# Patient Record
Sex: MALE | Race: WHITE | Employment: OTHER | URBAN - NONMETROPOLITAN AREA
[De-identification: names, ages, dates, MRNs, and addresses within clinical notes are randomized per-mention and may not be internally consistent; named-entity substitution may affect disease eponyms.]

---

## 2017-01-18 RX ORDER — HEPARIN SODIUM (PORCINE) LOCK FLUSH IV SOLN 100 UNIT/ML 100 UNIT/ML
500 SOLUTION INTRAVENOUS PRN
Status: CANCELLED | OUTPATIENT
Start: 2017-02-01

## 2017-01-18 RX ORDER — DIPHENHYDRAMINE HYDROCHLORIDE 50 MG/ML
50 INJECTION INTRAMUSCULAR; INTRAVENOUS ONCE
Status: CANCELLED | OUTPATIENT
Start: 2017-02-01 | End: 2017-02-01

## 2017-01-18 RX ORDER — 0.9 % SODIUM CHLORIDE 0.9 %
10 VIAL (ML) INJECTION ONCE
Status: CANCELLED | OUTPATIENT
Start: 2017-02-01 | End: 2017-02-01

## 2017-01-18 RX ORDER — SODIUM CHLORIDE 0.9 % (FLUSH) 0.9 %
10 SYRINGE (ML) INJECTION PRN
Status: CANCELLED | OUTPATIENT
Start: 2017-02-01

## 2017-01-18 RX ORDER — SODIUM CHLORIDE 9 MG/ML
INJECTION, SOLUTION INTRAVENOUS CONTINUOUS
Status: CANCELLED | OUTPATIENT
Start: 2017-02-01

## 2017-01-18 RX ORDER — METHYLPREDNISOLONE SODIUM SUCCINATE 125 MG/2ML
125 INJECTION, POWDER, LYOPHILIZED, FOR SOLUTION INTRAMUSCULAR; INTRAVENOUS ONCE
Status: CANCELLED | OUTPATIENT
Start: 2017-02-01 | End: 2017-02-01

## 2017-02-01 ENCOUNTER — OFFICE VISIT (OUTPATIENT)
Dept: ONCOLOGY | Age: 82
End: 2017-02-01

## 2017-02-01 VITALS
HEART RATE: 79 BPM | TEMPERATURE: 98.3 F | HEIGHT: 69 IN | DIASTOLIC BLOOD PRESSURE: 62 MMHG | WEIGHT: 196.4 LBS | SYSTOLIC BLOOD PRESSURE: 102 MMHG | RESPIRATION RATE: 18 BRPM | OXYGEN SATURATION: 97 % | BODY MASS INDEX: 29.09 KG/M2

## 2017-02-01 DIAGNOSIS — C64.9 RENAL CANCER, UNSPECIFIED LATERALITY (HCC): Primary | ICD-10-CM

## 2017-02-01 PROCEDURE — 99214 OFFICE O/P EST MOD 30 MIN: CPT | Performed by: INTERNAL MEDICINE

## 2017-02-01 RX ORDER — ONDANSETRON 4 MG/1
4 TABLET, FILM COATED ORAL EVERY 8 HOURS PRN
Qty: 60 TABLET | Refills: 5 | Status: SHIPPED | OUTPATIENT
Start: 2017-02-01 | End: 2019-01-28

## 2017-02-15 RX ORDER — 0.9 % SODIUM CHLORIDE 0.9 %
10 VIAL (ML) INJECTION ONCE
Status: CANCELLED | OUTPATIENT
Start: 2017-03-01 | End: 2017-03-01

## 2017-02-15 RX ORDER — DIPHENHYDRAMINE HYDROCHLORIDE 50 MG/ML
50 INJECTION INTRAMUSCULAR; INTRAVENOUS ONCE
Status: CANCELLED | OUTPATIENT
Start: 2017-03-01 | End: 2017-03-01

## 2017-02-15 RX ORDER — SODIUM CHLORIDE 9 MG/ML
INJECTION, SOLUTION INTRAVENOUS CONTINUOUS
Status: CANCELLED | OUTPATIENT
Start: 2017-03-01

## 2017-02-15 RX ORDER — HEPARIN SODIUM (PORCINE) LOCK FLUSH IV SOLN 100 UNIT/ML 100 UNIT/ML
500 SOLUTION INTRAVENOUS PRN
Status: CANCELLED | OUTPATIENT
Start: 2017-03-01

## 2017-02-15 RX ORDER — METHYLPREDNISOLONE SODIUM SUCCINATE 125 MG/2ML
125 INJECTION, POWDER, LYOPHILIZED, FOR SOLUTION INTRAMUSCULAR; INTRAVENOUS ONCE
Status: CANCELLED | OUTPATIENT
Start: 2017-03-01 | End: 2017-03-01

## 2017-02-15 RX ORDER — SODIUM CHLORIDE 0.9 % (FLUSH) 0.9 %
10 SYRINGE (ML) INJECTION PRN
Status: CANCELLED | OUTPATIENT
Start: 2017-03-01

## 2017-03-08 RX ORDER — SODIUM CHLORIDE 9 MG/ML
INJECTION, SOLUTION INTRAVENOUS CONTINUOUS
Status: CANCELLED | OUTPATIENT
Start: 2017-03-29

## 2017-03-08 RX ORDER — HEPARIN SODIUM (PORCINE) LOCK FLUSH IV SOLN 100 UNIT/ML 100 UNIT/ML
500 SOLUTION INTRAVENOUS PRN
Status: CANCELLED | OUTPATIENT
Start: 2017-03-29

## 2017-03-08 RX ORDER — DIPHENHYDRAMINE HYDROCHLORIDE 50 MG/ML
50 INJECTION INTRAMUSCULAR; INTRAVENOUS ONCE
Status: CANCELLED | OUTPATIENT
Start: 2017-03-29 | End: 2017-03-29

## 2017-03-08 RX ORDER — SODIUM CHLORIDE 0.9 % (FLUSH) 0.9 %
10 SYRINGE (ML) INJECTION PRN
Status: CANCELLED | OUTPATIENT
Start: 2017-03-29

## 2017-03-08 RX ORDER — 0.9 % SODIUM CHLORIDE 0.9 %
10 VIAL (ML) INJECTION ONCE
Status: CANCELLED | OUTPATIENT
Start: 2017-03-29 | End: 2017-03-29

## 2017-03-08 RX ORDER — METHYLPREDNISOLONE SODIUM SUCCINATE 125 MG/2ML
125 INJECTION, POWDER, LYOPHILIZED, FOR SOLUTION INTRAMUSCULAR; INTRAVENOUS ONCE
Status: CANCELLED | OUTPATIENT
Start: 2017-03-29 | End: 2017-03-29

## 2017-03-29 ENCOUNTER — OFFICE VISIT (OUTPATIENT)
Dept: ONCOLOGY | Age: 82
End: 2017-03-29

## 2017-03-29 VITALS
DIASTOLIC BLOOD PRESSURE: 64 MMHG | HEIGHT: 69 IN | RESPIRATION RATE: 20 BRPM | WEIGHT: 196.6 LBS | TEMPERATURE: 97 F | BODY MASS INDEX: 29.12 KG/M2 | HEART RATE: 76 BPM | OXYGEN SATURATION: 97 % | SYSTOLIC BLOOD PRESSURE: 110 MMHG

## 2017-03-29 DIAGNOSIS — D3A.00 CARCINOID TUMOR: Primary | ICD-10-CM

## 2017-03-29 PROCEDURE — 99215 OFFICE O/P EST HI 40 MIN: CPT | Performed by: INTERNAL MEDICINE

## 2017-03-29 RX ORDER — SODIUM CHLORIDE 0.9 % (FLUSH) 0.9 %
10 SYRINGE (ML) INJECTION PRN
Status: CANCELLED | OUTPATIENT
Start: 2017-05-24

## 2017-03-29 RX ORDER — SODIUM CHLORIDE 9 MG/ML
INJECTION, SOLUTION INTRAVENOUS CONTINUOUS
Status: CANCELLED | OUTPATIENT
Start: 2017-05-24

## 2017-03-29 RX ORDER — METHYLPREDNISOLONE SODIUM SUCCINATE 125 MG/2ML
125 INJECTION, POWDER, LYOPHILIZED, FOR SOLUTION INTRAMUSCULAR; INTRAVENOUS ONCE
Status: CANCELLED | OUTPATIENT
Start: 2017-05-24 | End: 2017-05-24

## 2017-03-29 RX ORDER — DIPHENHYDRAMINE HYDROCHLORIDE 50 MG/ML
50 INJECTION INTRAMUSCULAR; INTRAVENOUS ONCE
Status: CANCELLED | OUTPATIENT
Start: 2017-05-24 | End: 2017-05-24

## 2017-03-29 RX ORDER — METHYLPREDNISOLONE SODIUM SUCCINATE 125 MG/2ML
125 INJECTION, POWDER, LYOPHILIZED, FOR SOLUTION INTRAMUSCULAR; INTRAVENOUS ONCE
Status: CANCELLED | OUTPATIENT
Start: 2017-04-26 | End: 2017-04-26

## 2017-03-29 RX ORDER — HEPARIN SODIUM (PORCINE) LOCK FLUSH IV SOLN 100 UNIT/ML 100 UNIT/ML
500 SOLUTION INTRAVENOUS PRN
Status: CANCELLED | OUTPATIENT
Start: 2017-05-24

## 2017-03-29 RX ORDER — SODIUM CHLORIDE 9 MG/ML
INJECTION, SOLUTION INTRAVENOUS CONTINUOUS
Status: CANCELLED | OUTPATIENT
Start: 2017-04-26

## 2017-03-29 RX ORDER — DIPHENHYDRAMINE HYDROCHLORIDE 50 MG/ML
50 INJECTION INTRAMUSCULAR; INTRAVENOUS ONCE
Status: CANCELLED | OUTPATIENT
Start: 2017-04-26 | End: 2017-04-26

## 2017-03-29 RX ORDER — 0.9 % SODIUM CHLORIDE 0.9 %
10 VIAL (ML) INJECTION ONCE
Status: CANCELLED | OUTPATIENT
Start: 2017-04-26 | End: 2017-04-26

## 2017-03-29 RX ORDER — 0.9 % SODIUM CHLORIDE 0.9 %
10 VIAL (ML) INJECTION ONCE
Status: CANCELLED | OUTPATIENT
Start: 2017-05-24 | End: 2017-05-24

## 2017-03-29 RX ORDER — SODIUM CHLORIDE 0.9 % (FLUSH) 0.9 %
10 SYRINGE (ML) INJECTION PRN
Status: CANCELLED | OUTPATIENT
Start: 2017-04-26

## 2017-03-29 RX ORDER — HEPARIN SODIUM (PORCINE) LOCK FLUSH IV SOLN 100 UNIT/ML 100 UNIT/ML
500 SOLUTION INTRAVENOUS PRN
Status: CANCELLED | OUTPATIENT
Start: 2017-04-26

## 2017-06-21 ENCOUNTER — OFFICE VISIT (OUTPATIENT)
Dept: ONCOLOGY | Age: 82
End: 2017-06-21

## 2017-06-21 VITALS
OXYGEN SATURATION: 98 % | RESPIRATION RATE: 18 BRPM | DIASTOLIC BLOOD PRESSURE: 54 MMHG | TEMPERATURE: 97.2 F | BODY MASS INDEX: 28.44 KG/M2 | HEART RATE: 82 BPM | WEIGHT: 192 LBS | HEIGHT: 69 IN | SYSTOLIC BLOOD PRESSURE: 95 MMHG

## 2017-06-21 DIAGNOSIS — C64.9 RENAL CANCER, UNSPECIFIED LATERALITY (HCC): Primary | ICD-10-CM

## 2017-06-21 PROCEDURE — 99215 OFFICE O/P EST HI 40 MIN: CPT | Performed by: INTERNAL MEDICINE

## 2017-06-21 RX ORDER — 0.9 % SODIUM CHLORIDE 0.9 %
10 VIAL (ML) INJECTION ONCE
Status: CANCELLED | OUTPATIENT
Start: 2017-07-19 | End: 2017-07-19

## 2017-06-21 RX ORDER — SODIUM CHLORIDE 9 MG/ML
INJECTION, SOLUTION INTRAVENOUS CONTINUOUS
Status: CANCELLED | OUTPATIENT
Start: 2017-07-19

## 2017-06-21 RX ORDER — SODIUM CHLORIDE 0.9 % (FLUSH) 0.9 %
10 SYRINGE (ML) INJECTION PRN
Status: CANCELLED | OUTPATIENT
Start: 2017-07-19

## 2017-06-21 RX ORDER — DIPHENHYDRAMINE HYDROCHLORIDE 50 MG/ML
50 INJECTION INTRAMUSCULAR; INTRAVENOUS ONCE
Status: CANCELLED | OUTPATIENT
Start: 2017-07-19 | End: 2017-07-19

## 2017-06-21 RX ORDER — HEPARIN SODIUM (PORCINE) LOCK FLUSH IV SOLN 100 UNIT/ML 100 UNIT/ML
500 SOLUTION INTRAVENOUS PRN
Status: CANCELLED | OUTPATIENT
Start: 2017-07-19

## 2017-06-21 RX ORDER — METHYLPREDNISOLONE SODIUM SUCCINATE 125 MG/2ML
125 INJECTION, POWDER, LYOPHILIZED, FOR SOLUTION INTRAMUSCULAR; INTRAVENOUS ONCE
Status: CANCELLED | OUTPATIENT
Start: 2017-07-19 | End: 2017-07-19

## 2017-07-19 ENCOUNTER — HOSPITAL ENCOUNTER (OUTPATIENT)
Dept: INFUSION THERAPY | Age: 82
Discharge: HOME | End: 2017-07-19
Payer: MEDICARE

## 2017-07-19 VITALS
TEMPERATURE: 98 F | WEIGHT: 191 LBS | OXYGEN SATURATION: 96 % | SYSTOLIC BLOOD PRESSURE: 97 MMHG | RESPIRATION RATE: 18 BRPM | HEART RATE: 77 BPM | BODY MASS INDEX: 28.29 KG/M2 | DIASTOLIC BLOOD PRESSURE: 56 MMHG | HEIGHT: 69 IN

## 2017-07-19 DIAGNOSIS — D3A.00 CARCINOID TUMOR: ICD-10-CM

## 2017-07-19 PROCEDURE — 6360000002 HC RX W HCPCS: Performed by: INTERNAL MEDICINE

## 2017-07-19 PROCEDURE — 96372 THER/PROPH/DIAG INJ SC/IM: CPT

## 2017-07-19 RX ADMIN — OCTREOTIDE ACETATE 30 MG: KIT at 10:37

## 2017-07-19 NOTE — IP AVS SNAPSHOT
After Visit Summary  (Discharge Instructions)    Medication List for Home    Based on the information you provided to us as well as any changes during this visit, the following is your updated medication list.  Compare this with your prescription bottles at home. If you have any questions or concerns, contact your primary care physician's office. Daily Medication List (This medication list can be shared with any healthcare provider who is helping you manage your medications)      ASK your doctor about these medications if you have questions        Last Dose    Next Dose Due AM NOON PM NIGHT    aspirin 81 MG tablet   Take 81 mg by mouth daily. atorvastatin 40 MG tablet   Commonly known as:  LIPITOR   Take 40 mg by mouth daily. clopidogrel 75 MG tablet   Commonly known as:  PLAVIX   Take 75 mg by mouth daily. Co Q-10 100 MG Caps   Take  by mouth.                                         isosorbide mononitrate 30 MG extended release tablet   Commonly known as:  IMDUR   Take 30 mg by mouth daily. losartan 50 MG tablet   Commonly known as:  COZAAR   Take 25 mg by mouth 2 times daily                                         ondansetron 4 MG tablet   Commonly known as:  ZOFRAN   Take 1 tablet by mouth every 8 hours as needed for Nausea or Vomiting                                         SANDOSTATIN LAR DEPOT IM   Inject  into the muscle every 28 days. Saw Palmetto 450 MG Caps   Take 450 mg by mouth 2 times daily. ULTRA FRANCESCO-TIME PO   Take 1 tablet by mouth daily.                                                  Allergies as of 7/19/2017        Reactions    Altace [Ramipril]     cough    Avodart [Dutasteride]     Cardura [Doxazosin] Cotrim [Sulfamethoxazole-trimethoprim]     Fenofibrate     Bleeding stomach    Finasteride Other (See Comments)    Hytrin [Terazosin]     Iron     Blurred vison    Naproxen     Pravachol [Pravastatin]     Procalamine     Yohimbine       Immunizations as of 2017     No immunizations on file. Last Vitals          Most Recent Value    Temp  98 °F (36.7 °C)    Pulse  77    Resp  18    BP  (!)  97/56         After Visit Summary    This summary was created for you. Thank you for entrusting your care to us. The following information includes details about your hospital/visit stay along with steps you should take to help with your recovery once you leave the hospital.  In this packet, you will find information about the topics listed below:    · Instructions about your medications including a list of your home medications  · A summary of your hospital visit  · Follow-up appointments once you have left the hospital  · Your care plan at home      You may receive a survey regarding the care you received during your stay. Your input is valuable to us. We encourage you to complete and return your survey in the envelope provided. We hope you will choose us in the future for your healthcare needs. Patient Information     Patient Name ROLANDA Jeong 1932      Care Provided at:     Name Address Phone       [address] [phone]            Your Visit    Here you will find information about your visit, including the reason for your visit. Please take this sheet with you when you visit your doctor or other health care provider in the future. It will help determine the best possible medical care for you at that time. If you have any questions once you leave the hospital, please call the department phone number listed below.         Why you were here     Your primary diagnosis was:  Not on File Visit Information     Date & Time Department Dept. Phone    7/19/2017 [facility] [phone]       Follow-up Appointments    Below is a list of your follow-up and future appointments. This may not be a complete list as you may have made appointments directly with providers that we are not aware of or your providers may have made some for you. Please call your providers to confirm appointments. It is important to keep your appointments. Please bring your current insurance card, photo ID, co-pay, and all medication bottles to your appointment. If self-pay, payment is expected at the time of service. Future Appointments     8/16/2017 10:00 AM     Appointment with [facility] at [facility] ([phone])   [address] [place] [address]       9/13/2017 9:45 AM     Appointment with Rachelle Che MD at [id number] of [facility] ([phone])   Please arrive 15 minutes prior to appointment, bring photo ID and insurance card. Please arrive 15 minutes prior to appointment, bring photo ID and insurance card. [address] [place] [address]       9/13/2017 10:45 AM     Appointment with [employer] [facility] at [facility] ([phone])   [address] [place] [address]       12/21/2017 11:30 AM     Appointment with Blu Oneill MD at [facility] ([phone])   Please arrive 15 minutes prior to appointment time, bring insurance card and photo ID. Please arrive 15 minutes prior to appointment time, bring insurance card and photo ID.   [address]  [facility]  [address] [place] [address]         Preventive Care        Date Due    Tetanus Combination Vaccine (1 - Tdap) 9/8/1951    Zoster Vaccine 9/8/1992    Pneumococcal Vaccines (two) for all adults aged 72 and over (1 of 2 - PCV13) 9/8/1997    Yearly Flu Vaccine (1) 8/1/2017                 Care Plan Once You Return Home This section includes instructions you will need to follow once you leave the hospital.  Your care team will discuss these with you, so you and those caring for you know how to best care for your health needs at home. This section may also include educational information about certain health topics that may be of help to you. Discharge Instructions       Please contact your Oncologist if you have any questions regarding the sandostatin that you received today. Patient instructed if experience any of the symptoms following today's sandostatin / to notify MD immediately or go to emergency department. * dizziness/lightheadedness  *acute nausea/vomiting - not relieved with medication  *headache - not relieved from Tylenol/pain medication  *chest pain/pressure  *rash/itching  *shortness of breath        Drink fluids - 48oz fluids daily  Call if develop fever/ chills/ signs or symptoms of infection      MyChart Signup     [employer] allows you to send messages to your doctor, view your test results, renew your prescriptions, schedule appointments, view visit notes, and more. How Do I Sign Up? 1. In your Internet browser, go to [email or web address]  2. Click on the Sign Up Now link in the Sign In box. You will see the New Member Sign Up page. 3. Enter your "[employer] Access Code exactly as it appears below. You will not need to use this code after youve completed the sign-up process. If you do not sign up before the expiration date, you must request a new code. "[employer] Access Code: [email or web address]  Expires: 8/20/2017 11:08 AM    4. Enter your Social Security Number ([id number]) and Date of Birth (mm/dd/yyyy) as indicated and click Submit. You will be taken to the next sign-up page. 5. Create a "[employer] ID. This will be your "[employer] login ID and cannot be changed, so think of one that is secure and easy to remember. 6. Create a [employer] [facility] password. You can change your password at any time. 7. Enter your Password Reset Question and Answer. This can be used at a later time if you forget your password. 8. Enter your e-mail address. You will receive e-mail notification when new information is available in [address] 9. Click Sign Up. You can now view your medical record. Additional Information  If you have questions, please contact the physician practice where you receive care. Remember, [employer] is NOT to be used for urgent needs. For medical emergencies, dial 911. For questions regarding your [employer] account call [phone]. If you have a clinical question, please call your doctor's office. View your information online  ? Review your current list of  medications, immunization, and allergies. ? Review your future test results online . ? Review your discharge instructions provided by your caregivers at discharge    Certain functionality such as prescription refills, scheduling appointments or sending messages to your provider are not activated if your provider does not use [employer] in his/her office    For questions regarding your [employer] account call [phone]. If you have a clinical question, please call your doctor's office. The information on all pages of the After Visit Summary has been reviewed with me, the patient and/or responsible adult, by my health care provider(s). I had the opportunity to ask questions regarding this information. I understand I should dispose of my armband safely at home to protect my health information. A complete copy of the After Visit Summary has been given to me, the patient and/or responsible adult.            Patient Signature/Responsible Adult:____________________    Clinician Signature:_____________________    Date:_____________________    Time:_____________________

## 2017-07-19 NOTE — IP AVS SNAPSHOT
Patient Information     Patient Name ROLANDA Barreto 1932         This is your updated medication list to keep with you all times      ASK your doctor about these medications     aspirin 81 MG tablet       atorvastatin 40 MG tablet   Commonly known as:  LIPITOR       clopidogrel 75 MG tablet   Commonly known as:  PLAVIX       Co Q-10 100 MG Caps       isosorbide mononitrate 30 MG extended release tablet   Commonly known as:  IMDUR       losartan 50 MG tablet   Commonly known as:  COZAAR       ondansetron 4 MG tablet   Commonly known as:  ZOFRAN   Take 1 tablet by mouth every 8 hours as needed for Nausea or Vomiting       SANDOSTATIN LAR DEPOT IM       Saw Palmetto 450 MG Caps       ULTRA FRANCESCO-TIME PO

## 2017-07-19 NOTE — PROGRESS NOTES
Patient tolerated sandostatin injection without any complications. Patient verbalizes understanding of discharge instructions, ambulated off unit per self with belongings.

## 2017-07-19 NOTE — PLAN OF CARE
Problem: Intellectual/Education/Knowledge Deficit  Intervention: Verbal/written education provided  Discussed sandostatin action, possible side effects and when to call the doctor. Goal: Teaching initiated upon admission  Outcome: Met This Shift  Patient verbalizes understanding to verbal information given on sandostatin,action and possible side effects. Aware to call MD if develop complications. Problem: Discharge Planning  Intervention: Interaction with patient/family and care team  Discuss discharge instructions, follow ups, and when to call the doctor. Goal: Knowledge of discharge instructions  Knowledge of discharge instructions  Outcome: Met This Shift  Verbalized understanding of discharge instructions, follow-up appointments, and when to call the physician. Comments:   Care plan reviewed with patient. Patient verbalize understanding of the plan of care and contribute to goal setting.

## 2017-08-02 RX ORDER — SODIUM CHLORIDE 0.9 % (FLUSH) 0.9 %
10 SYRINGE (ML) INJECTION PRN
Status: CANCELLED | OUTPATIENT
Start: 2017-08-16

## 2017-08-02 RX ORDER — SODIUM CHLORIDE 9 MG/ML
INJECTION, SOLUTION INTRAVENOUS CONTINUOUS
Status: CANCELLED | OUTPATIENT
Start: 2017-08-16

## 2017-08-02 RX ORDER — 0.9 % SODIUM CHLORIDE 0.9 %
10 VIAL (ML) INJECTION ONCE
Status: CANCELLED | OUTPATIENT
Start: 2017-08-16 | End: 2017-08-16

## 2017-08-02 RX ORDER — DIPHENHYDRAMINE HYDROCHLORIDE 50 MG/ML
50 INJECTION INTRAMUSCULAR; INTRAVENOUS ONCE
Status: CANCELLED | OUTPATIENT
Start: 2017-08-16 | End: 2017-08-16

## 2017-08-02 RX ORDER — HEPARIN SODIUM (PORCINE) LOCK FLUSH IV SOLN 100 UNIT/ML 100 UNIT/ML
500 SOLUTION INTRAVENOUS PRN
Status: CANCELLED | OUTPATIENT
Start: 2017-08-16

## 2017-08-02 RX ORDER — METHYLPREDNISOLONE SODIUM SUCCINATE 125 MG/2ML
125 INJECTION, POWDER, LYOPHILIZED, FOR SOLUTION INTRAMUSCULAR; INTRAVENOUS ONCE
Status: CANCELLED | OUTPATIENT
Start: 2017-08-16 | End: 2017-08-16

## 2017-08-16 ENCOUNTER — HOSPITAL ENCOUNTER (OUTPATIENT)
Dept: INFUSION THERAPY | Age: 82
Discharge: HOME | End: 2017-08-16
Payer: MEDICARE

## 2017-08-16 VITALS
RESPIRATION RATE: 18 BRPM | OXYGEN SATURATION: 97 % | DIASTOLIC BLOOD PRESSURE: 63 MMHG | HEIGHT: 69 IN | TEMPERATURE: 98.5 F | SYSTOLIC BLOOD PRESSURE: 99 MMHG

## 2017-08-16 DIAGNOSIS — D3A.00 CARCINOID TUMOR: ICD-10-CM

## 2017-08-16 PROCEDURE — 6360000002 HC RX W HCPCS: Performed by: INTERNAL MEDICINE

## 2017-08-16 PROCEDURE — 96372 THER/PROPH/DIAG INJ SC/IM: CPT

## 2017-08-16 RX ADMIN — OCTREOTIDE ACETATE 30 MG: KIT at 10:14

## 2017-08-16 NOTE — PLAN OF CARE
Problem: Intellectual/Education/Knowledge Deficit  Intervention: Verbal/written education provided  Discuss sandostatin,action and possible side effects. Aware to call MD if develop complications. Goal: Teaching initiated upon admission  Outcome: Met This Shift  Patient verbalizes understanding to verbal information given on sandostatin,action and possible side effects. Aware to call MD if develop complications. Problem: Discharge Planning  Intervention: Interaction with patient/family and care team  Discuss discharge instructions, follow ups and when to call doctor. Goal: Knowledge of discharge instructions  Knowledge of discharge instructions  Outcome: Met This Shift  Discuss discharge instructions, follow ups and when to call doctor. Comments:   Care plan reviewed with patient. Patient  verbalized understanding of the plan of care and contribute to goal setting.

## 2017-08-16 NOTE — PROGRESS NOTES
Sandostatin given as charted. Tolerated well. Discharged in satisfactory condition. Ambulated off unit per self.

## 2017-08-16 NOTE — IP AVS SNAPSHOT
Patient Information     Patient Name ROLANDA Garcia 1932         This is your updated medication list to keep with you all times      ASK your doctor about these medications     aspirin 81 MG tablet       atorvastatin 40 MG tablet   Commonly known as:  LIPITOR       clopidogrel 75 MG tablet   Commonly known as:  PLAVIX       Co Q-10 100 MG Caps       isosorbide mononitrate 30 MG extended release tablet   Commonly known as:  IMDUR       losartan 50 MG tablet   Commonly known as:  COZAAR       ondansetron 4 MG tablet   Commonly known as:  ZOFRAN   Take 1 tablet by mouth every 8 hours as needed for Nausea or Vomiting       SANDOSTATIN LAR DEPOT IM       Saw Palmetto 450 MG Caps       ULTRA FRANCESCO-TIME PO

## 2017-08-16 NOTE — IP AVS SNAPSHOT
After Visit Summary  (Discharge Instructions)    Medication List for Home    Based on the information you provided to us as well as any changes during this visit, the following is your updated medication list.  Compare this with your prescription bottles at home. If you have any questions or concerns, contact your primary care physician's office. Daily Medication List (This medication list can be shared with any healthcare provider who is helping you manage your medications)      ASK your doctor about these medications if you have questions        Last Dose    Next Dose Due AM NOON PM NIGHT    aspirin 81 MG tablet   Take 81 mg by mouth daily. atorvastatin 40 MG tablet   Commonly known as:  LIPITOR   Take 40 mg by mouth daily. clopidogrel 75 MG tablet   Commonly known as:  PLAVIX   Take 75 mg by mouth daily. Co Q-10 100 MG Caps   Take  by mouth.                                         isosorbide mononitrate 30 MG extended release tablet   Commonly known as:  IMDUR   Take 30 mg by mouth daily. losartan 50 MG tablet   Commonly known as:  COZAAR   Take 25 mg by mouth 2 times daily                                         ondansetron 4 MG tablet   Commonly known as:  ZOFRAN   Take 1 tablet by mouth every 8 hours as needed for Nausea or Vomiting                                         SANDOSTATIN LAR DEPOT IM   Inject  into the muscle every 28 days. Saw Palmetto 450 MG Caps   Take 450 mg by mouth 2 times daily. ULTRA FRANCESCO-TIME PO   Take 1 tablet by mouth daily.                                                  Allergies as of 8/16/2017        Reactions    Altace [Ramipril]     cough    Avodart [Dutasteride]     Cardura [Doxazosin] Cotrim [Sulfamethoxazole-trimethoprim]     Fenofibrate     Bleeding stomach    Finasteride Other (See Comments)    Hytrin [Terazosin]     Iron     Blurred vison    Naproxen     Pravachol [Pravastatin]     Procalamine     Yohimbine       Immunizations as of 2017     No immunizations on file. Last Vitals          Most Recent Value    Temp  98.5 °F (36.9 °C)    Pulse      Resp  18    BP  99/63         After Visit Summary    This summary was created for you. Thank you for entrusting your care to us. The following information includes details about your hospital/visit stay along with steps you should take to help with your recovery once you leave the hospital.  In this packet, you will find information about the topics listed below:    · Instructions about your medications including a list of your home medications  · A summary of your hospital visit  · Follow-up appointments once you have left the hospital  · Your care plan at home      You may receive a survey regarding the care you received during your stay. Your input is valuable to us. We encourage you to complete and return your survey in the envelope provided. We hope you will choose us in the future for your healthcare needs. Patient Information     Patient Name ROLANDA Strauss 1932      Care Provided at:     Name Address Phone       [address] [phone]            Your Visit    Here you will find information about your visit, including the reason for your visit. Please take this sheet with you when you visit your doctor or other health care provider in the future. It will help determine the best possible medical care for you at that time. If you have any questions once you leave the hospital, please call the department phone number listed below.         Why you were here     Your primary diagnosis was:  Not on File      Visit Information Date & Time Department Dept. Phone    8/16/2017 [facility] [phone]       Follow-up Appointments    Below is a list of your follow-up and future appointments. This may not be a complete list as you may have made appointments directly with providers that we are not aware of or your providers may have made some for you. Please call your providers to confirm appointments. It is important to keep your appointments. Please bring your current insurance card, photo ID, co-pay, and all medication bottles to your appointment. If self-pay, payment is expected at the time of service. Future Appointments     9/13/2017 9:45 AM     Appointment with Ayden Monsalve MD at [id number] of [facility] ([phone])   Please arrive 15 minutes prior to appointment, bring photo ID and insurance card. Please arrive 15 minutes prior to appointment, bring photo ID and insurance card. [address] [place] [address]       9/13/2017 10:45 AM     Appointment with [address] [place] [address] at [address] ([phone])   [address] [place] [address]       12/21/2017 11:30 AM     Appointment with aV Jade MD at [facility] ([phone])   Please arrive 15 minutes prior to appointment time, bring insurance card and photo ID. Please arrive 15 minutes prior to appointment time, bring insurance card and photo ID.   [address] [address]         Preventive Care        Date Due    Tetanus Combination Vaccine (1 - Tdap) 9/8/1951    Zoster Vaccine 9/8/1992    Pneumococcal Vaccines (two) for all adults aged 72 and over (1 of 2 - PCV13) 9/8/1997    Yearly Flu Vaccine (1) 8/1/2017                 Care Plan Once You Return Home    This section includes instructions you will need to follow once you leave the hospital.  Your care team will discuss these with you, so you and those caring for you know how to best care for your health needs at home. This section may also include educational information about certain health topics that may be of help to you. Discharge Instructions       Please contact your Oncologist if you have any questions regarding the sandostatin that you received today. MyChart Signup     [employer] allows you to send messages to your doctor, view your test results, renew your prescriptions, schedule appointments, view visit notes, and more. How Do I Sign Up? 1. In your Internet browser, go to [email or web address]  2. Click on the Sign Up Now link in the Sign In box. You will see the New Member Sign Up page. 3. Enter your [employer] Access Code exactly as it appears below. You will not need to use this code after youve completed the sign-up process. If you do not sign up before the expiration date, you must request a new code. [employer] Access Code: [email or web address]  Expires: 10/13/2017  4:57 AM    4. Enter your Social Security Number ([id number]) and Date of Birth (mm/dd/yyyy) as indicated and click Submit. You will be taken to the next sign-up page. 5. Create a [employer] ID. This will be your [employer] login ID and cannot be changed, so think of one that is secure and easy to remember. 6. Create a [employer] password. You can change your password at any time. 7. Enter your Password Reset Question and Answer. This can be used at a later time if you forget your password. 8. Enter your e-mail address. You will receive e-mail notification when new information is available in [address] 9. Click Sign Up. You can now view your medical record. Additional Information  If you have questions, please contact the physician practice where you receive care. Remember, [employer] is NOT to be used for urgent needs. For medical emergencies, dial 911. For questions regarding your [employer] account call [phone]. If you have a clinical question, please call your doctor's office. View your information online  ? Review your current list of  medications, immunization, and allergies. ? Review your future test results online . ? Review your discharge instructions provided by your caregivers at discharge    Certain functionality such as prescription refills, scheduling appointments or sending messages to your provider are not activated if your provider does not use [employer] in his/her office    For questions regarding your MyChart account call [phone]. If you have a clinical question, please call your doctor's office. The information on all pages of the After Visit Summary has been reviewed with me, the patient and/or responsible adult, by my health care provider(s). I had the opportunity to ask questions regarding this information. I understand I should dispose of my armband safely at home to protect my health information. A complete copy of the After Visit Summary has been given to me, the patient and/or responsible adult.            Patient Signature/Responsible Adult:____________________    Clinician Signature:_____________________    Date:_____________________    Time:_____________________

## 2017-08-26 RX ORDER — SODIUM CHLORIDE 9 MG/ML
INJECTION, SOLUTION INTRAVENOUS CONTINUOUS
Status: CANCELLED | OUTPATIENT
Start: 2017-09-13

## 2017-08-26 RX ORDER — SODIUM CHLORIDE 0.9 % (FLUSH) 0.9 %
10 SYRINGE (ML) INJECTION PRN
Status: CANCELLED | OUTPATIENT
Start: 2017-09-13

## 2017-08-26 RX ORDER — HEPARIN SODIUM (PORCINE) LOCK FLUSH IV SOLN 100 UNIT/ML 100 UNIT/ML
500 SOLUTION INTRAVENOUS PRN
Status: CANCELLED | OUTPATIENT
Start: 2017-09-13

## 2017-08-26 RX ORDER — DIPHENHYDRAMINE HYDROCHLORIDE 50 MG/ML
50 INJECTION INTRAMUSCULAR; INTRAVENOUS ONCE
Status: CANCELLED | OUTPATIENT
Start: 2017-09-13 | End: 2017-09-13

## 2017-08-26 RX ORDER — 0.9 % SODIUM CHLORIDE 0.9 %
10 VIAL (ML) INJECTION ONCE
Status: CANCELLED | OUTPATIENT
Start: 2017-09-13 | End: 2017-09-13

## 2017-08-26 RX ORDER — METHYLPREDNISOLONE SODIUM SUCCINATE 125 MG/2ML
125 INJECTION, POWDER, LYOPHILIZED, FOR SOLUTION INTRAMUSCULAR; INTRAVENOUS ONCE
Status: CANCELLED | OUTPATIENT
Start: 2017-09-13 | End: 2017-09-13

## 2017-09-13 ENCOUNTER — HOSPITAL ENCOUNTER (OUTPATIENT)
Dept: INFUSION THERAPY | Age: 82
Discharge: HOME | End: 2017-09-13
Payer: MEDICARE

## 2017-09-13 VITALS
OXYGEN SATURATION: 96 % | HEART RATE: 69 BPM | RESPIRATION RATE: 18 BRPM | DIASTOLIC BLOOD PRESSURE: 65 MMHG | TEMPERATURE: 98 F | SYSTOLIC BLOOD PRESSURE: 122 MMHG

## 2017-09-13 DIAGNOSIS — C64.9 RENAL CANCER, UNSPECIFIED LATERALITY (HCC): ICD-10-CM

## 2017-09-13 DIAGNOSIS — D3A.00 CARCINOID TUMOR: ICD-10-CM

## 2017-09-13 PROCEDURE — 6360000002 HC RX W HCPCS: Performed by: INTERNAL MEDICINE

## 2017-09-13 PROCEDURE — 96372 THER/PROPH/DIAG INJ SC/IM: CPT

## 2017-09-13 RX ADMIN — OCTREOTIDE ACETATE 30 MG: KIT at 11:12

## 2017-09-13 NOTE — IP AVS SNAPSHOT
After Visit Summary  (Discharge Instructions)    Medication List for Home    Based on the information you provided to us as well as any changes during this visit, the following is your updated medication list.  Compare this with your prescription bottles at home. If you have any questions or concerns, contact your primary care physician's office. Daily Medication List (This medication list can be shared with any healthcare provider who is helping you manage your medications)      ASK your doctor about these medications if you have questions        Last Dose    Next Dose Due AM NOON PM NIGHT    aspirin 81 MG tablet   Take 81 mg by mouth daily. atorvastatin 40 MG tablet   Commonly known as:  LIPITOR   Take 40 mg by mouth daily. clopidogrel 75 MG tablet   Commonly known as:  PLAVIX   Take 75 mg by mouth daily. Co Q-10 100 MG Caps   Take  by mouth.                                         isosorbide mononitrate 30 MG extended release tablet   Commonly known as:  IMDUR   Take 30 mg by mouth daily. losartan 50 MG tablet   Commonly known as:  COZAAR   Take 25 mg by mouth 2 times daily                                         ondansetron 4 MG tablet   Commonly known as:  ZOFRAN   Take 1 tablet by mouth every 8 hours as needed for Nausea or Vomiting                                         SANDOSTATIN LAR DEPOT IM   Inject  into the muscle every 28 days. 30 mg on 9/13/2017 11:12 AM                            Saw Palmetto 450 MG Caps   Take 450 mg by mouth 2 times daily. ULTRA FRANCESCO-TIME PO   Take 1 tablet by mouth daily.                                                  Allergies as of 9/13/2017        Reactions    Altace [Ramipril]     cough    Avodart [Dutasteride]     Cardura [Doxazosin] Cotrim [Sulfamethoxazole-trimethoprim]     Fenofibrate     Bleeding stomach    Finasteride Other (See Comments)    Hytrin [Terazosin]     Iron     Blurred vison    Naproxen     Pravachol [Pravastatin]     Procalamine     Yohimbine       Immunizations as of 2017     No immunizations on file. Last Vitals          Most Recent Value    Temp  98 °F (36.7 °C)    Pulse  69    Resp  18    BP  122/65         After Visit Summary    This summary was created for you. Thank you for entrusting your care to us. The following information includes details about your hospital/visit stay along with steps you should take to help with your recovery once you leave the hospital.  In this packet, you will find information about the topics listed below:    · Instructions about your medications including a list of your home medications  · A summary of your hospital visit  · Follow-up appointments once you have left the hospital  · Your care plan at home      You may receive a survey regarding the care you received during your stay. Your input is valuable to us. We encourage you to complete and return your survey in the envelope provided. We hope you will choose us in the future for your healthcare needs. Patient Information     Patient Name ROLANDA Christiansen Card 1932      Care Provided at:     Name Address Phone       [address] [phone]            Your Visit    Here you will find information about your visit, including the reason for your visit. Please take this sheet with you when you visit your doctor or other health care provider in the future. It will help determine the best possible medical care for you at that time. If you have any questions once you leave the hospital, please call the department phone number listed below.         Why you were here     Your primary diagnosis was:  Not on File      Visit Information Date & Time Department Dept. Phone    9/13/2017 [facility] ONCOLOGY [phone]       Follow-up Appointments    Below is a list of your follow-up and future appointments. This may not be a complete list as you may have made appointments directly with providers that we are not aware of or your providers may have made some for you. Please call your providers to confirm appointments. It is important to keep your appointments. Please bring your current insurance card, photo ID, co-pay, and all medication bottles to your appointment. If self-pay, payment is expected at the time of service. Future Appointments     10/11/2017 9:15 AM     Appointment with Hannah Young MD at [phone] of [facility] ([phone])   Please arrive 15 minutes prior to appointment, bring photo ID and insurance card. Please arrive 15 minutes prior to appointment, bring photo ID and insurance card. [address] [place] [address]       10/11/2017 10:15 AM     Appointment with [address] [place] [address] at [facility] ([phone])   [address] [place] [address]       12/21/2017 11:30 AM     Appointment with Adrienne Lagunas MD at [facility] ([phone])   Please arrive 15 minutes prior to appointment time, bring insurance card and photo ID. Please arrive 15 minutes prior to appointment time, bring insurance card and photo ID.   [address] [address]         Preventive Care        Date Due    Tetanus Combination Vaccine (1 - Tdap) 9/8/1951    Zoster Vaccine 9/8/1992    Yearly Flu Vaccine (1) 9/1/2017    Pneumococcal Vaccines (two) for all adults aged 72 and over (2 of 2 - PPSV23) 11/4/2017                 Care Plan Once You Return Home    This section includes instructions you will need to follow once you leave the hospital.  Your care team will discuss these with you, so you and those caring for you know how to best care for your health needs at home. This section may also include educational information about certain health topics that may be of help to you. Discharge Instructions       Please contact your Oncologist if you have any questions regarding the sandostatin IM injection that you received today. Patient instructed if experience any of the symptoms following today's sandostatin/ to notify MD immediately or go to emergency department. * dizziness/lightheadedness  *acute nausea/vomiting - not relieved with medication  *headache - not relieved from Tylenol/pain medication  *chest pain/pressure  *rash/itching  *shortness of breath        Drink fluids - 48oz fluids daily  Call if develop fever/ chills/ signs or symptoms of infection      MyChart Signup     [employer] allows you to send messages to your doctor, view your test results, renew your prescriptions, schedule appointments, view visit notes, and more. How Do I Sign Up? 1. In your Internet browser, go to [email or web address]  2. Click on the Sign Up Now link in the Sign In box. You will see the New Member Sign Up page. 3. Enter your [employer] Access Code exactly as it appears below. You will not need to use this code after youve completed the sign-up process. If you do not sign up before the expiration date, you must request a new code. [employer] Access Code: [email or web address]  Expires: 10/13/2017  4:57 AM    4. Enter your Social Security Number ([id number]) and Date of Birth (mm/dd/yyyy) as indicated and click Submit. You will be taken to the next sign-up page. 5. Create a WatchFrog ID. This will be your [employer] login ID and cannot be changed, so think of one that is secure and easy to remember. 6. Create a [employer] password. You can change your password at any time. 7. Enter your Password Reset Question and Answer. This can be used at a later time if you forget your password. 8. Enter your e-mail address.  You will receive e-mail notification when new information is available in [employer]. 9. Click Sign Up. You can now view your medical record. Additional Information  If you have questions, please contact the physician practice where you receive care. Remember, [employer] is NOT to be used for urgent needs. For medical emergencies, dial 911. For questions regarding your [employer] account call [phone]. If you have a clinical question, please call your doctor's office. View your information online  ? Review your current list of  medications, immunization, and allergies. ? Review your future test results online . ? Review your discharge instructions provided by your caregivers at discharge    Certain functionality such as prescription refills, scheduling appointments or sending messages to your provider are not activated if your provider does not use [employer] in his/her office    For questions regarding your [employer] account call [phone]. If you have a clinical question, please call your doctor's office. The information on all pages of the After Visit Summary has been reviewed with me, the patient and/or responsible adult, by my health care provider(s). I had the opportunity to ask questions regarding this information. I understand I should dispose of my armband safely at home to protect my health information. A complete copy of the After Visit Summary has been given to me, the patient and/or responsible adult.            Patient Signature/Responsible Adult:____________________    Clinician Signature:_____________________    Date:_____________________    Time:_____________________

## 2017-09-13 NOTE — PROGRESS NOTES
Patient assessed for the following post sandostatin injection:    Dizziness   No  Lightheadedness  No     Acute nausea/vomiting No  Headache   No  Chest pain/pressure  No  Rash/itching   No  Shortness of breath  No     Patient tolerated sandostatin lar IM injection without any complications. Last vital signs:   /65  Pulse 69  Temp 98 °F (36.7 °C) (Oral)   Resp 18  SpO2 96%    Patient instructed if experience any of the above symptoms following today's injection, he/she is to notify MD immediately or go to the emergency department. Discharge instructions given to patient. Verbalizes understanding. Ambulated off unit per self with belongings.

## 2017-09-13 NOTE — IP AVS SNAPSHOT
Patient Information     Patient Name ROLANDA Monet 1932         This is your updated medication list to keep with you all times      ASK your doctor about these medications     aspirin 81 MG tablet       atorvastatin 40 MG tablet   Commonly known as:  LIPITOR       clopidogrel 75 MG tablet   Commonly known as:  PLAVIX       Co Q-10 100 MG Caps       isosorbide mononitrate 30 MG extended release tablet   Commonly known as:  IMDUR       losartan 50 MG tablet   Commonly known as:  COZAAR       ondansetron 4 MG tablet   Commonly known as:  ZOFRAN   Take 1 tablet by mouth every 8 hours as needed for Nausea or Vomiting       SANDOSTATIN LAR DEPOT IM       Saw Palmetto 450 MG Caps       ULTRA FRANCESCO-TIME PO

## 2017-09-13 NOTE — PLAN OF CARE
Problem: Intellectual/Education/Knowledge Deficit  Intervention: Verbal/written education provided  sandostatin lar reviewed. Patient verbalized understanding of medication being administered and potential side effects. Goal: Teaching initiated upon admission  Outcome: Met This Shift  Patient verbalizes understanding to verbal information given on sandostatin lar injection,action and possible side effects. Aware to call MD if develop complications. Problem: Discharge Planning  Intervention: Interaction with patient/family and care team  Provide discharge instructions. Goal: Knowledge of discharge instructions  Knowledge of discharge instructions   Outcome: Met This Shift  Verbalized understanding of discharge instructions, follow-up appointments, and when to call the physician. Comments:   Care plan reviewed with patient. Patient verbalize understanding of the plan of care and contribute to goal setting.

## 2017-10-04 DIAGNOSIS — C64.9 RENAL CANCER, UNSPECIFIED LATERALITY (HCC): Primary | ICD-10-CM

## 2017-10-09 RX ORDER — SODIUM CHLORIDE 0.9 % (FLUSH) 0.9 %
10 SYRINGE (ML) INJECTION PRN
Status: CANCELLED | OUTPATIENT
Start: 2017-10-11

## 2017-10-09 RX ORDER — METHYLPREDNISOLONE SODIUM SUCCINATE 125 MG/2ML
125 INJECTION, POWDER, LYOPHILIZED, FOR SOLUTION INTRAMUSCULAR; INTRAVENOUS ONCE
Status: CANCELLED | OUTPATIENT
Start: 2017-10-11 | End: 2017-10-11

## 2017-10-09 RX ORDER — SODIUM CHLORIDE 9 MG/ML
INJECTION, SOLUTION INTRAVENOUS CONTINUOUS
Status: CANCELLED | OUTPATIENT
Start: 2017-10-11

## 2017-10-09 RX ORDER — HEPARIN SODIUM (PORCINE) LOCK FLUSH IV SOLN 100 UNIT/ML 100 UNIT/ML
500 SOLUTION INTRAVENOUS PRN
Status: CANCELLED | OUTPATIENT
Start: 2017-10-11

## 2017-10-09 RX ORDER — 0.9 % SODIUM CHLORIDE 0.9 %
10 VIAL (ML) INJECTION ONCE
Status: CANCELLED | OUTPATIENT
Start: 2017-10-11 | End: 2017-10-11

## 2017-10-09 RX ORDER — DIPHENHYDRAMINE HYDROCHLORIDE 50 MG/ML
50 INJECTION INTRAMUSCULAR; INTRAVENOUS ONCE
Status: CANCELLED | OUTPATIENT
Start: 2017-10-11 | End: 2017-10-11

## 2017-10-11 ENCOUNTER — OFFICE VISIT (OUTPATIENT)
Dept: ONCOLOGY | Age: 82
End: 2017-10-11
Payer: MEDICARE

## 2017-10-11 ENCOUNTER — HOSPITAL ENCOUNTER (OUTPATIENT)
Dept: INFUSION THERAPY | Age: 82
Discharge: HOME | End: 2017-10-11
Payer: MEDICARE

## 2017-10-11 VITALS
BODY MASS INDEX: 28.35 KG/M2 | WEIGHT: 191.4 LBS | SYSTOLIC BLOOD PRESSURE: 97 MMHG | HEART RATE: 79 BPM | DIASTOLIC BLOOD PRESSURE: 62 MMHG | RESPIRATION RATE: 18 BRPM | OXYGEN SATURATION: 97 % | TEMPERATURE: 98.2 F | HEIGHT: 69 IN

## 2017-10-11 VITALS
OXYGEN SATURATION: 97 % | HEART RATE: 76 BPM | TEMPERATURE: 98.2 F | RESPIRATION RATE: 18 BRPM | DIASTOLIC BLOOD PRESSURE: 64 MMHG | SYSTOLIC BLOOD PRESSURE: 100 MMHG

## 2017-10-11 DIAGNOSIS — C64.9 RENAL CANCER, UNSPECIFIED LATERALITY (HCC): Primary | ICD-10-CM

## 2017-10-11 DIAGNOSIS — D3A.00 CARCINOID TUMOR: ICD-10-CM

## 2017-10-11 DIAGNOSIS — C64.9 RENAL CANCER, UNSPECIFIED LATERALITY (HCC): ICD-10-CM

## 2017-10-11 LAB
ALBUMIN SERPL-MCNC: 4 G/DL (ref 3.5–5.1)
ALP BLD-CCNC: 74 U/L (ref 38–126)
ALT SERPL-CCNC: 19 U/L (ref 11–66)
AST SERPL-CCNC: 15 U/L (ref 5–40)
BASINOPHIL, AUTOMATED: 0 % (ref 0–12)
BILIRUB SERPL-MCNC: 0.5 MG/DL (ref 0.3–1.2)
BILIRUBIN DIRECT: < 0.2 MG/DL (ref 0–0.3)
BUN, WHOLE BLOOD: 18 MG/DL (ref 8–26)
CHLORIDE, WHOLE BLOOD: 98 MEQ/L (ref 98–109)
CREATININE, WHOLE BLOOD: 1.2 MG/DL (ref 0.5–1.2)
EOSINOPHILS RELATIVE PERCENT: 6 % (ref 0–12)
GFR, ESTIMATED ,CON: 61 ML/MIN/1.73M2
GLUCOSE, WHOLE BLOOD: 124 MG/DL (ref 70–108)
HCT VFR BLD CALC: 41.8 % (ref 42–52)
HEMOGLOBIN: 14.2 GM/DL (ref 14–18)
IONIZED CALCIUM, WHOLE BLOOD: 1.14 MMOL/L (ref 1.12–1.32)
LYMPHOCYTES # BLD: 29 % (ref 15–47)
MCH RBC QN AUTO: 30.2 PG (ref 27–31)
MCHC RBC AUTO-ENTMCNC: 33.9 GM/DL (ref 33–37)
MCV RBC AUTO: 89 FL (ref 80–94)
MONOCYTES: 8 % (ref 0–12)
PDW BLD-RTO: 12.7 % (ref 11.5–14.5)
PLATELET # BLD: 183 THOU/MM3 (ref 130–400)
PMV BLD AUTO: 8.6 MCM (ref 7.4–10.4)
POTASSIUM, WHOLE BLOOD: 4.6 MEQ/L (ref 3.5–4.9)
RBC # BLD: 4.7 MILL/MM3 (ref 4.7–6.1)
SEG NEUTROPHILS: 57 % (ref 43–75)
SODIUM, WHOLE BLOOD: 137 MEQ/L (ref 138–146)
TOTAL CO2, WHOLE BLOOD: 27 MEQ/L (ref 23–33)
TOTAL PROTEIN: 6.8 G/DL (ref 6.1–8)
WBC # BLD: 8.7 THOU/MM3 (ref 4.8–10.8)

## 2017-10-11 PROCEDURE — 96372 THER/PROPH/DIAG INJ SC/IM: CPT

## 2017-10-11 PROCEDURE — G0463 HOSPITAL OUTPT CLINIC VISIT: HCPCS

## 2017-10-11 PROCEDURE — 6360000002 HC RX W HCPCS: Performed by: INTERNAL MEDICINE

## 2017-10-11 PROCEDURE — 80076 HEPATIC FUNCTION PANEL: CPT

## 2017-10-11 PROCEDURE — 36415 COLL VENOUS BLD VENIPUNCTURE: CPT

## 2017-10-11 PROCEDURE — 99215 OFFICE O/P EST HI 40 MIN: CPT | Performed by: INTERNAL MEDICINE

## 2017-10-11 PROCEDURE — 85025 COMPLETE CBC W/AUTO DIFF WBC: CPT

## 2017-10-11 PROCEDURE — 80047 BASIC METABLC PNL IONIZED CA: CPT

## 2017-10-11 RX ORDER — HEPARIN SODIUM (PORCINE) LOCK FLUSH IV SOLN 100 UNIT/ML 100 UNIT/ML
500 SOLUTION INTRAVENOUS PRN
Status: CANCELLED | OUTPATIENT
Start: 2017-12-06

## 2017-10-11 RX ORDER — DIPHENHYDRAMINE HYDROCHLORIDE 50 MG/ML
50 INJECTION INTRAMUSCULAR; INTRAVENOUS ONCE
Status: CANCELLED | OUTPATIENT
Start: 2017-12-06 | End: 2017-12-06

## 2017-10-11 RX ORDER — HEPARIN SODIUM (PORCINE) LOCK FLUSH IV SOLN 100 UNIT/ML 100 UNIT/ML
500 SOLUTION INTRAVENOUS PRN
Status: CANCELLED | OUTPATIENT
Start: 2017-11-08

## 2017-10-11 RX ORDER — 0.9 % SODIUM CHLORIDE 0.9 %
10 VIAL (ML) INJECTION ONCE
Status: CANCELLED | OUTPATIENT
Start: 2017-11-08 | End: 2017-11-08

## 2017-10-11 RX ORDER — SODIUM CHLORIDE 9 MG/ML
INJECTION, SOLUTION INTRAVENOUS CONTINUOUS
Status: CANCELLED | OUTPATIENT
Start: 2017-11-08

## 2017-10-11 RX ORDER — 0.9 % SODIUM CHLORIDE 0.9 %
10 VIAL (ML) INJECTION ONCE
Status: CANCELLED | OUTPATIENT
Start: 2017-12-06 | End: 2017-12-06

## 2017-10-11 RX ORDER — SODIUM CHLORIDE 0.9 % (FLUSH) 0.9 %
10 SYRINGE (ML) INJECTION PRN
Status: CANCELLED | OUTPATIENT
Start: 2017-12-06

## 2017-10-11 RX ORDER — SODIUM CHLORIDE 0.9 % (FLUSH) 0.9 %
10 SYRINGE (ML) INJECTION PRN
Status: CANCELLED | OUTPATIENT
Start: 2017-11-08

## 2017-10-11 RX ORDER — DIPHENHYDRAMINE HYDROCHLORIDE 50 MG/ML
50 INJECTION INTRAMUSCULAR; INTRAVENOUS ONCE
Status: CANCELLED | OUTPATIENT
Start: 2017-11-08 | End: 2017-11-08

## 2017-10-11 RX ORDER — METHYLPREDNISOLONE SODIUM SUCCINATE 125 MG/2ML
125 INJECTION, POWDER, LYOPHILIZED, FOR SOLUTION INTRAMUSCULAR; INTRAVENOUS ONCE
Status: CANCELLED | OUTPATIENT
Start: 2017-11-08 | End: 2017-11-08

## 2017-10-11 RX ORDER — METHYLPREDNISOLONE SODIUM SUCCINATE 125 MG/2ML
125 INJECTION, POWDER, LYOPHILIZED, FOR SOLUTION INTRAMUSCULAR; INTRAVENOUS ONCE
Status: CANCELLED | OUTPATIENT
Start: 2017-12-06 | End: 2017-12-06

## 2017-10-11 RX ORDER — SODIUM CHLORIDE 9 MG/ML
INJECTION, SOLUTION INTRAVENOUS CONTINUOUS
Status: CANCELLED | OUTPATIENT
Start: 2017-12-06

## 2017-10-11 RX ADMIN — OCTREOTIDE ACETATE 30 MG: KIT at 10:32

## 2017-10-11 NOTE — PROGRESS NOTES
10/9/2013. There was noted to be stable BPH. There was an area of \"inflammatory reaction\" extending off the tail of the pancreas along the inferior aspect of the spleen. The tail of the pancreas revealed minimal edema. The possibility of pancreatitis was suggested. There was noted to be change in the kidney interpreted as pyelonephritis. However the radiologist did not make mention of the recent partial nephrectomy which could also be the explanation for the other described findings. He was started on Octreotide on 03/12/2014. His symptoms and his disease have stabilized since that time. HPI:  The patient is here today for follow up evaluation. His general condition has been stable. The patient has had good control of diarrhea since the start of Octreotide. He has some very mild facial flushing intermittently. The patient comes to clinic today stating his general sense of well being remains good. He denies shortness of breath, chest pain, or a change in bladder habits. ECOG performance status is level 0. PMH, SH, and FH:  I reviewed the PMH, SH and FH as noted on the electronic medical record. There have been no changes as noted in the previous documentation. Review of Systems   HENT: Negative. Respiratory: Negative. Cardiovascular: Negative. Gastrointestinal: Negative. Genitourinary: Negative. Musculoskeletal: Chronic arthralgias. Skin: Negative. Neurological: Negative. Hematological: Negative. Psychiatric/Behavioral: Negative. All other systems reviewed and are negative. Objective:   Physical Exam   Vitals:    10/11/17 0938   BP: 97/62   Pulse: 79   Resp: 18   Temp: 98.2 °F (36.8 °C)   SpO2: 97%   Vitals reviewed and are stable. Constitutional: Well-developed and well-nourished. No distress. HENT: Normocephalic and atraumatic. Oropharynx is clear and moist. No oropharyngeal exudate. Eyes: Pupils are equal and reactive. No scleral icterus.    Pulmonary/Chest: Effort normal. No respiratory distress. Cardiovascular: Normal rate and regular rhythm. Normal S1 and S2. Abdominal: Soft. No distension. Bowel sounds active. Musculoskeletal: No edema or tenderness in all four extremities. Good range of motion. Neurological: Alert and oriented to person, place, and time. Judgment and thought content normal.  Skin: Skin is warm and dry. No rash noted. No erythema. No pallor. Psychiatric: Mood and affect appropriate for the clinical situation. Behavior is normal.        Data Analysis:  I reviewed the laboratory work from today and compared it to the previous laboratory work from 03/29/2017, 02/01/2017, and 10/12/2016. Hematology 10/11/2017 6/21/2017 3/29/2017   WBC 8.7 7.6 8.1   RBC 4.70 4.48 (L) 4.69 (L)   HGB 14.2 13.7 (L) 14.1   HCT 41.8 (L) 40.5 (L) 41.0 (L)   MCV 89 90 87   RDW 12.7 12.5 12.7    165 186     Hematology 2/1/2017   WBC 8.1   RBC 4.81   HGB 14.7   HCT 43.6   MCV 91   RDW 12.4          Assessment:   1. Carcinoid malignancy. 2. Chemotherapy encounter. 3. Renal cell carcinoma. Plan:   1. Continue therapy with octreotide monthly. 2.  Monitor for recurrence of renal cell carcinoma  3. Monitor for recurrence of carcinoid malignancy. 4.  Return to clinic in three months. 5.  Laboratory studies on return to clinic. 6.  CT scan of abdomen prior to RTC.      Nita Mckee M.D.  [facility] [place] [address] [address] [facility], [address] [place] [address], [address] [place] [address], [address] [place] [address]

## 2017-10-11 NOTE — PLAN OF CARE
Problem: Intellectual/Education/Knowledge Deficit  Intervention: Verbal/written education provided  sandostatin reviewed. Patient verbalized understanding of medication being administered and potential side effects. Goal: Teaching initiated upon admission  Outcome: Met This Shift  Patient verbalizes understanding to verbal information given on sandostatin IM,action and possible side effects. Aware to call MD if develop complications. Problem: Discharge Planning  Intervention: Interaction with patient/family and care team  Provide discharge instructions. Goal: Knowledge of discharge instructions  Knowledge of discharge instructions     Outcome: Met This Shift  Verbalized understanding of discharge instructions, follow-up appointments, and when to call the physician. Comments: Care plan reviewed with patient. Patient verbalize understanding of the plan of care and contribute to goal setting.

## 2017-10-11 NOTE — PATIENT INSTRUCTIONS
1.  Octreotide today. 2.  Octreotide Monthly. 3.  Labs on RTC. 4.  CT scan in December prior to RTC - patient believes this already scheduled.

## 2017-11-08 ENCOUNTER — HOSPITAL ENCOUNTER (OUTPATIENT)
Dept: INFUSION THERAPY | Age: 82
Discharge: HOME | End: 2017-11-08
Payer: MEDICARE

## 2017-11-08 VITALS
WEIGHT: 190.8 LBS | TEMPERATURE: 97.8 F | RESPIRATION RATE: 18 BRPM | BODY MASS INDEX: 28.26 KG/M2 | HEIGHT: 69 IN | SYSTOLIC BLOOD PRESSURE: 105 MMHG | OXYGEN SATURATION: 97 % | HEART RATE: 70 BPM | DIASTOLIC BLOOD PRESSURE: 59 MMHG

## 2017-11-08 DIAGNOSIS — D3A.00 CARCINOID TUMOR: ICD-10-CM

## 2017-11-08 PROCEDURE — 96372 THER/PROPH/DIAG INJ SC/IM: CPT

## 2017-11-08 PROCEDURE — 6360000002 HC RX W HCPCS: Performed by: INTERNAL MEDICINE

## 2017-11-08 RX ADMIN — OCTREOTIDE ACETATE 30 MG: KIT at 09:53

## 2017-11-08 NOTE — PLAN OF CARE
Problem: Intellectual/Education/Knowledge Deficit  Intervention: Verbal/written education provided  Discuss understanding to verbal information given on Sandostatin IM injection. Goal: Teaching initiated upon admission  Outcome: Met This Shift  Patient verbalizes understanding to verbal information given on Sandostatin, action and possible side effects. Aware to call MD if develop complications. Problem: Discharge Planning  Intervention: Interaction with patient/family and care team  Discuss understanding of discharge instructions,follow-up appointments, and when to call the physician. Goal: Knowledge of discharge instructions  Knowledge of discharge instructions     Outcome: Met This Shift  Verbalized understanding of discharge instructions, follow-up appointments, and when to call the physician. Comments: Care plan reviewed with patient. Patient verbalize understanding of the plan of care and contribute to goal setting.

## 2017-11-08 NOTE — PROGRESS NOTES
Patient tolerated Sandostatin IM injection without any complications. Discharge instructions given to patient-verbalizes understanding. Ambulated off unit per self in stable condition.

## 2017-11-13 ENCOUNTER — TELEPHONE (OUTPATIENT)
Dept: ONCOLOGY | Age: 82
End: 2017-11-13

## 2017-11-15 ENCOUNTER — HOSPITAL ENCOUNTER (OUTPATIENT)
Dept: INFUSION THERAPY | Age: 82
Discharge: HOME | End: 2017-11-15
Payer: MEDICARE

## 2017-11-15 ENCOUNTER — OFFICE VISIT (OUTPATIENT)
Dept: ONCOLOGY | Age: 82
End: 2017-11-15
Payer: MEDICARE

## 2017-11-15 VITALS
RESPIRATION RATE: 18 BRPM | OXYGEN SATURATION: 99 % | DIASTOLIC BLOOD PRESSURE: 58 MMHG | WEIGHT: 189 LBS | HEART RATE: 77 BPM | HEIGHT: 69 IN | SYSTOLIC BLOOD PRESSURE: 104 MMHG | BODY MASS INDEX: 27.99 KG/M2 | TEMPERATURE: 98 F

## 2017-11-15 DIAGNOSIS — R10.84 GENERALIZED ABDOMINAL PAIN: ICD-10-CM

## 2017-11-15 DIAGNOSIS — D3A.00 CARCINOID TUMOR: ICD-10-CM

## 2017-11-15 DIAGNOSIS — C64.9 MALIGNANT NEOPLASM OF KIDNEY, UNSPECIFIED LATERALITY (HCC): Primary | ICD-10-CM

## 2017-11-15 PROCEDURE — 1123F ACP DISCUSS/DSCN MKR DOCD: CPT | Performed by: INTERNAL MEDICINE

## 2017-11-15 PROCEDURE — 1036F TOBACCO NON-USER: CPT | Performed by: INTERNAL MEDICINE

## 2017-11-15 PROCEDURE — 99214 OFFICE O/P EST MOD 30 MIN: CPT | Performed by: INTERNAL MEDICINE

## 2017-11-15 PROCEDURE — G8484 FLU IMMUNIZE NO ADMIN: HCPCS | Performed by: INTERNAL MEDICINE

## 2017-11-15 PROCEDURE — G8598 ASA/ANTIPLAT THER USED: HCPCS | Performed by: INTERNAL MEDICINE

## 2017-11-15 PROCEDURE — G8427 DOCREV CUR MEDS BY ELIG CLIN: HCPCS | Performed by: INTERNAL MEDICINE

## 2017-11-15 PROCEDURE — G8417 CALC BMI ABV UP PARAM F/U: HCPCS | Performed by: INTERNAL MEDICINE

## 2017-11-15 PROCEDURE — 99211 OFF/OP EST MAY X REQ PHY/QHP: CPT

## 2017-11-15 PROCEDURE — 4040F PNEUMOC VAC/ADMIN/RCVD: CPT | Performed by: INTERNAL MEDICINE

## 2017-11-15 RX ORDER — OXYCODONE HYDROCHLORIDE AND ACETAMINOPHEN 5; 325 MG/1; MG/1
TABLET ORAL
COMMUNITY
Start: 2017-11-12 | End: 2017-11-15 | Stop reason: SDUPTHER

## 2017-11-15 RX ORDER — OXYCODONE HYDROCHLORIDE AND ACETAMINOPHEN 5; 325 MG/1; MG/1
1 TABLET ORAL EVERY 6 HOURS PRN
Qty: 60 TABLET | Refills: 0 | Status: SHIPPED | OUTPATIENT
Start: 2017-11-15 | End: 2018-11-21 | Stop reason: SDUPTHER

## 2017-11-15 RX ORDER — OXYCODONE HYDROCHLORIDE AND ACETAMINOPHEN 5; 325 MG/1; MG/1
1 TABLET ORAL EVERY 6 HOURS PRN
Qty: 60 TABLET | Refills: 0 | Status: SHIPPED | OUTPATIENT
Start: 2017-11-15 | End: 2017-11-15 | Stop reason: SDUPTHER

## 2017-11-15 NOTE — PROGRESS NOTES
[facility]  [facility]  [facility] [address] [facility]  [address] [address]  Dept: [phone]  Dept Fax: [phone]  Loc: [phone]    Subjective:      Chief Complaint: Noe Wang is a 80 y.o. male. The patient has been treated for malignant carcinoid syndrome. He was diagnosed in December of 2012 when he presented with abdominal pain. An abdominal CAT scan demonstrated intra-abdominal adenopathy and disease in the liver. He underwent a liver biopsy on November 11, 2012 which confirmed carcinoid tumor. An octreotide scan confirmed disease in the liver and abdominal lymph nodes. At the same time there was a mass noted in the left kidney that was concerning for a second primary malignancy. A CT directed biopsy was canceled by the radiologist and before an open biopsy could be scheduled, and the patient experienced a myocardial infarction. He had three stents placed and takes anti-platelet medication routinely. He subsequently developed problems with urinary retention and was found to have prostatic hypertrophy. He had a PSA of 7. He was advised that he was a candidate for a TURP but could not have this procedure done because of the myocardial infarction. A follow up abdominal CAT scan was completed on October 9, 2013. This demonstrated an increase in the size of lesions in the liver, abdominal lymph nodes and kidney. 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 Joaquim Contreras developed an increase of right upper quadrant and mid abdominal pain in March, 2014. He was also having increasing episodes of diarrhea.  He was referred for an abdominal CAT scan completed on 3/11/2014 that demonstrated enlargement of all areas of hepatic metastasis compared to a CAT scan obtained on 10/9/2013. There was noted to be stable BPH. There was an area of \"inflammatory reaction\" extending off the tail of the pancreas along the inferior aspect of the spleen. The tail of the pancreas revealed minimal edema. The possibility of pancreatitis was suggested. There was noted to be change in the kidney interpreted as pyelonephritis. However the radiologist did not make mention of the recent partial nephrectomy which could also be the explanation for the other described findings. He was started on Octreotide on 03/12/2014. HPI:  The patient is here today for follow up evaluation. Young Ty has been noted to have increased abdominal pain. This brought him to the emergency room over this past weekend. A CT scan of the abdomen found growth of the malignancy within the liver with some suggestion of extension into the stomach region. The measurement of the tumor is a proximal 1.5 cm larger than his last CT scan approximately 9 months ago. I discussed with the patient local regional treatment options such as radiofrequency ablation. This would require a evaluation at tertiary care center. He is willing to proceed with a evaluation at [facility] for localized therapy to his liver malignancy. Given the growth of the malignancy in his previous history this is likely a carcinoid tumor. The patient is using Percocet for pain control. He still has pain but has it is adequately controlled. The patient has had constipation from the use of Percocet. His bladder habits have normal. ECOG performance status is level 0. PMH, SH, and FH:  I reviewed the PMH, SH and FH as noted on the electronic medical record. There have been no changes as noted in the previous documentation. Review of Systems   Constitutional: Negative. HENT: Negative. Eyes: Negative. Respiratory: Negative. Cardiovascular: Negative. Gastrointestinal: Abdominal pain. Genitourinary: Negative. Musculoskeletal: Negative. Skin: Negative. Neurological: Negative. Hematological: Negative. Psychiatric/Behavioral: Negative. Objective:   Physical Exam   Vitals:    11/15/17 0834   BP: (!) 104/58   Pulse: 77   Resp: 18   Temp: 98 °F (36.7 °C)   SpO2: 99%   Vitals reviewed and are stable. Constitutional: Well-developed and well-nourished. No acute distress. HENT: Normocephalic and atraumatic. Eyes: Pupils are equal and reactive. No scleral icterus. Neck: Overall appearance is symmetrical. No identifiable masses. Chest: Inspection and palpation of chest is normal.  Pulmonary: Effort normal. No respiratory distress. Cardiovascular: RRR. No edema in any of the four extremities. Abdominal: Soft. Tender. Neurological: Alert and oriented to person, place, and time. Judgment and thought content normal.  Skin: Skin is warm and dry. No rash. Psychiatric: Mood and affect appropriate for the clinical situation. Behavior is normal.      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 2. Renal cell carcinoma. 3. Abdominal pain. Plan:   1. Referral to [facility] for local regional therapy such as RFA for liver lesions. 2.  Continue therapy with octreotide monthly. 3.  Monitor for progression of malignancy. 4.  Continue Percocet for pain control.      Sudeep Poe [place] [address] "[employer] [facility], [address] [place] [address], [address] [place] [address], [address] [place] [address]

## 2017-11-15 NOTE — PATIENT INSTRUCTIONS
1.  [facility] Dr. Luis Man [facility] or similar procedure for carcinoid malignancy involving the liver   2. Continue monthly Sandostatin therapy. 3.  RTC after evaluation at [facility] completed.

## 2017-11-16 DIAGNOSIS — C64.9 RENAL CANCER, UNSPECIFIED LATERALITY (HCC): ICD-10-CM

## 2017-12-06 ENCOUNTER — HOSPITAL ENCOUNTER (OUTPATIENT)
Dept: INFUSION THERAPY | Age: 82
Discharge: HOME | End: 2017-12-06
Payer: MEDICARE

## 2017-12-06 VITALS
HEIGHT: 69 IN | WEIGHT: 188.4 LBS | SYSTOLIC BLOOD PRESSURE: 110 MMHG | DIASTOLIC BLOOD PRESSURE: 65 MMHG | RESPIRATION RATE: 18 BRPM | OXYGEN SATURATION: 95 % | BODY MASS INDEX: 27.91 KG/M2 | HEART RATE: 75 BPM | TEMPERATURE: 98.2 F

## 2017-12-06 DIAGNOSIS — D3A.00 CARCINOID TUMOR: ICD-10-CM

## 2017-12-06 PROCEDURE — 96372 THER/PROPH/DIAG INJ SC/IM: CPT

## 2017-12-06 PROCEDURE — 6360000002 HC RX W HCPCS: Performed by: INTERNAL MEDICINE

## 2017-12-06 RX ADMIN — OCTREOTIDE ACETATE 30 MG: KIT at 10:12

## 2017-12-06 NOTE — PLAN OF CARE
Problem: Intellectual/Education/Knowledge Deficit  Intervention: Verbal/written education provided  Discuss Sandostatin. Goal: Teaching initiated upon admission  Outcome: Met This Shift  Patient verbalizes understanding to verbal information given on sandostatin,action and possible side effects. Aware to call MD if develop complications. Problem: Discharge Planning  Intervention: Discharge to appropriate level of care  Discuss discharge instructions, follow ups and when to call doctor. Goal: Knowledge of discharge instructions  Knowledge of discharge instructions    Outcome: Met This Shift  Verbalized understanding of discharge instructions, follow ups and when to call doctor    Problem: Falls - Risk of: Intervention: Assess risk factors for falls  Assess for fall risk, instruct to ask for assistance with ambulation    Goal: Will remain free from falls  Will remain free from falls  Outcome: Met This Shift  Assess for fall risk, instruct to ask for assistance with ambulation    Comments: Care plan reviewed with patient. Patient  verbalized understanding of the plan of care and contribute to goal setting.

## 2017-12-20 RX ORDER — METHYLPREDNISOLONE SODIUM SUCCINATE 125 MG/2ML
125 INJECTION, POWDER, LYOPHILIZED, FOR SOLUTION INTRAMUSCULAR; INTRAVENOUS ONCE
Status: CANCELLED | OUTPATIENT
Start: 2018-01-10 | End: 2018-01-10

## 2017-12-20 RX ORDER — DIPHENHYDRAMINE HYDROCHLORIDE 50 MG/ML
50 INJECTION INTRAMUSCULAR; INTRAVENOUS ONCE
Status: CANCELLED | OUTPATIENT
Start: 2018-01-10 | End: 2018-01-10

## 2017-12-20 RX ORDER — 0.9 % SODIUM CHLORIDE 0.9 %
10 VIAL (ML) INJECTION ONCE
Status: CANCELLED | OUTPATIENT
Start: 2018-01-10 | End: 2018-01-10

## 2017-12-20 RX ORDER — SODIUM CHLORIDE 0.9 % (FLUSH) 0.9 %
10 SYRINGE (ML) INJECTION PRN
Status: CANCELLED | OUTPATIENT
Start: 2018-01-10

## 2017-12-20 RX ORDER — HEPARIN SODIUM (PORCINE) LOCK FLUSH IV SOLN 100 UNIT/ML 100 UNIT/ML
500 SOLUTION INTRAVENOUS PRN
Status: CANCELLED | OUTPATIENT
Start: 2018-01-10

## 2017-12-20 RX ORDER — SODIUM CHLORIDE 9 MG/ML
INJECTION, SOLUTION INTRAVENOUS CONTINUOUS
Status: CANCELLED | OUTPATIENT
Start: 2018-01-10

## 2017-12-21 ENCOUNTER — OFFICE VISIT (OUTPATIENT)
Dept: UROLOGY | Age: 82
End: 2017-12-21
Payer: MEDICARE

## 2017-12-21 VITALS
WEIGHT: 187.5 LBS | DIASTOLIC BLOOD PRESSURE: 60 MMHG | SYSTOLIC BLOOD PRESSURE: 118 MMHG | HEIGHT: 68 IN | BODY MASS INDEX: 28.42 KG/M2

## 2017-12-21 DIAGNOSIS — C64.9 MALIGNANT NEOPLASM OF KIDNEY, UNSPECIFIED LATERALITY (HCC): Primary | ICD-10-CM

## 2017-12-21 LAB
BILIRUBIN URINE: NEGATIVE
BLOOD URINE, POC: NORMAL
CHARACTER, URINE: CLEAR
COLOR, URINE: YELLOW
GLUCOSE URINE: NEGATIVE MG/DL
KETONES, URINE: NEGATIVE
LEUKOCYTE CLUMPS, URINE: NEGATIVE
NITRITE, URINE: NEGATIVE
PH, URINE: 6
PROTEIN, URINE: NEGATIVE MG/DL
SPECIFIC GRAVITY, URINE: 1.01 (ref 1–1.03)
UROBILINOGEN, URINE: 0.2 EU/DL

## 2017-12-21 PROCEDURE — 1036F TOBACCO NON-USER: CPT | Performed by: UROLOGY

## 2017-12-21 PROCEDURE — G8427 DOCREV CUR MEDS BY ELIG CLIN: HCPCS | Performed by: UROLOGY

## 2017-12-21 PROCEDURE — G8598 ASA/ANTIPLAT THER USED: HCPCS | Performed by: UROLOGY

## 2017-12-21 PROCEDURE — 81003 URINALYSIS AUTO W/O SCOPE: CPT | Performed by: UROLOGY

## 2017-12-21 PROCEDURE — 4040F PNEUMOC VAC/ADMIN/RCVD: CPT | Performed by: UROLOGY

## 2017-12-21 PROCEDURE — G8484 FLU IMMUNIZE NO ADMIN: HCPCS | Performed by: UROLOGY

## 2017-12-21 PROCEDURE — 99214 OFFICE O/P EST MOD 30 MIN: CPT | Performed by: UROLOGY

## 2017-12-21 PROCEDURE — 1123F ACP DISCUSS/DSCN MKR DOCD: CPT | Performed by: UROLOGY

## 2017-12-21 PROCEDURE — G8417 CALC BMI ABV UP PARAM F/U: HCPCS | Performed by: UROLOGY

## 2017-12-21 ASSESSMENT — ENCOUNTER SYMPTOMS
DIARRHEA: 0
SHORTNESS OF BREATH: 0
CHEST TIGHTNESS: 0
CONSTIPATION: 0

## 2017-12-21 NOTE — PROGRESS NOTES
Take 1 tablet by mouth every 6 hours as needed for Pain ., Disp: 60 tablet, Rfl: 0    ondansetron (ZOFRAN) 4 MG tablet, Take 1 tablet by mouth every 8 hours as needed for Nausea or Vomiting, Disp: 60 tablet, Rfl: 5    clopidogrel (PLAVIX) 75 MG tablet, Take 75 mg by mouth daily. , Disp: , Rfl:     Octreotide Acetate (SANDOSTATIN LAR DEPOT IM), Inject  into the muscle every 28 days. , Disp: , Rfl:     losartan (COZAAR) 50 MG tablet, Take 25 mg by mouth 2 times daily , Disp: , Rfl:     aspirin 81 MG tablet, Take 81 mg by mouth daily. , Disp: , Rfl:     atorvastatin (LIPITOR) 40 MG tablet, Take 40 mg by mouth daily. , Disp: , Rfl:     Coenzyme Q10 (CO Q-10) 100 MG CAPS, Take  by mouth., Disp: , Rfl:     isosorbide mononitrate (IMDUR) 30 MG CR tablet, Take 30 mg by mouth daily. , Disp: , Rfl:     Saw [place] 450 MG CAPS, Take 450 mg by mouth 2 times daily. , Disp: , Rfl:     Multiple Vitamins-Minerals (ULTRA FRANCESCO-TIME PO), Take 1 tablet by mouth daily. , Disp: , Rfl:     Review of Systems   Constitutional: Negative for chills and fever. HENT: Negative for mouth sores and nosebleeds. Respiratory: Negative for chest tightness and shortness of breath. Cardiovascular: Negative for chest pain and palpitations. Gastrointestinal: Negative for constipation and diarrhea. Genitourinary: Positive for difficulty urinating, frequency and urgency. Negative for dysuria, flank pain and hematuria. Musculoskeletal: Negative for joint swelling. Skin: Negative for rash and wound. Neurological: Negative for seizures and numbness. Hematological: Does not bruise/bleed easily.        /60   Ht 5' 7.5\" (1.715 m)   Wt 187 lb 8 oz (85 kg)   BMI 28.93 kg/m²     Objective:   Physical Exam     Labs    Results for POC orders placed in visit on 12/21/17   POCT Urinalysis No Micro (Auto)   Result Value Ref Range    Glucose, Ur Negative NEGATIVE mg/dl    Bilirubin Urine Negative     Ketones, Urine Negative NEGATIVE

## 2018-01-10 ENCOUNTER — HOSPITAL ENCOUNTER (OUTPATIENT)
Dept: INFUSION THERAPY | Age: 83
Discharge: HOME | End: 2018-01-10
Payer: MEDICARE

## 2018-01-10 VITALS
RESPIRATION RATE: 18 BRPM | OXYGEN SATURATION: 99 % | TEMPERATURE: 97.5 F | SYSTOLIC BLOOD PRESSURE: 107 MMHG | HEART RATE: 86 BPM | WEIGHT: 188.4 LBS | DIASTOLIC BLOOD PRESSURE: 75 MMHG | BODY MASS INDEX: 29.07 KG/M2

## 2018-01-10 DIAGNOSIS — D3A.00 CARCINOID TUMOR: ICD-10-CM

## 2018-01-10 PROCEDURE — 96372 THER/PROPH/DIAG INJ SC/IM: CPT

## 2018-01-10 PROCEDURE — 6360000002 HC RX W HCPCS: Performed by: INTERNAL MEDICINE

## 2018-01-10 RX ADMIN — OCTREOTIDE ACETATE 30 MG: KIT at 09:55

## 2018-01-10 ASSESSMENT — PAIN SCALES - GENERAL: PAINLEVEL_OUTOF10: 0

## 2018-01-10 NOTE — PLAN OF CARE
Problem: Musculor/Skeletal Functional Status  Intervention: Fall precautions  No falls this admission    Goal: Absence of falls  Outcome: Met This Shift  No falls this admission     Problem: Intellectual/Education/Knowledge Deficit  Intervention: Verbal/written education provided  Discharge instruction sheets    Goal: Teaching initiated upon admission  Outcome: Met This Shift  Reviewed sandostatin lar with patient, patient offered no questions or concerns. Patient verbalized understanding of drug being administered. Goal: Written Disposition Instruction form completed  Outcome: Met This Shift  Discharge instructions given and reviewed with patient. All questions answered. Patient verbalized understanding    Problem: Discharge Planning  Intervention: Interaction with patient/family and care team  Patient currently denies any needs or concerns  Intervention: Discharge to appropriate level of care  Discharge home    Goal: Knowledge of discharge instructions  Knowledge of discharge instructions    Outcome: Met This Shift  Patient  able to teach back follow up appointments and when to call the doctor. Patient offers no questions at this time    Comments: Care plan reviewed with patient and he verbalized understanding of the plan of care and contributed to goal setting.

## 2018-01-26 RX ORDER — DIPHENHYDRAMINE HYDROCHLORIDE 50 MG/ML
50 INJECTION INTRAMUSCULAR; INTRAVENOUS ONCE
Status: CANCELLED | OUTPATIENT
Start: 2018-02-05 | End: 2018-02-07

## 2018-01-26 RX ORDER — EPINEPHRINE 1 MG/ML
0.3 INJECTION, SOLUTION, CONCENTRATE INTRAVENOUS PRN
Status: CANCELLED | OUTPATIENT
Start: 2018-02-05

## 2018-01-26 RX ORDER — 0.9 % SODIUM CHLORIDE 0.9 %
10 VIAL (ML) INJECTION ONCE
Status: CANCELLED | OUTPATIENT
Start: 2018-02-05 | End: 2018-02-07

## 2018-01-26 RX ORDER — SODIUM CHLORIDE 9 MG/ML
INJECTION, SOLUTION INTRAVENOUS CONTINUOUS
Status: CANCELLED | OUTPATIENT
Start: 2018-02-05

## 2018-01-26 RX ORDER — HEPARIN SODIUM (PORCINE) LOCK FLUSH IV SOLN 100 UNIT/ML 100 UNIT/ML
500 SOLUTION INTRAVENOUS PRN
Status: CANCELLED | OUTPATIENT
Start: 2018-02-05

## 2018-01-26 RX ORDER — SODIUM CHLORIDE 0.9 % (FLUSH) 0.9 %
10 SYRINGE (ML) INJECTION PRN
Status: CANCELLED | OUTPATIENT
Start: 2018-02-05

## 2018-01-26 RX ORDER — METHYLPREDNISOLONE SODIUM SUCCINATE 125 MG/2ML
125 INJECTION, POWDER, LYOPHILIZED, FOR SOLUTION INTRAMUSCULAR; INTRAVENOUS ONCE
Status: CANCELLED | OUTPATIENT
Start: 2018-02-05 | End: 2018-02-07

## 2018-02-05 ENCOUNTER — HOSPITAL ENCOUNTER (OUTPATIENT)
Dept: INFUSION THERAPY | Age: 83
Discharge: HOME | End: 2018-02-05
Payer: MEDICARE

## 2018-02-05 VITALS
TEMPERATURE: 97.6 F | WEIGHT: 180 LBS | OXYGEN SATURATION: 99 % | DIASTOLIC BLOOD PRESSURE: 69 MMHG | BODY MASS INDEX: 28.25 KG/M2 | HEIGHT: 67 IN | RESPIRATION RATE: 18 BRPM | SYSTOLIC BLOOD PRESSURE: 137 MMHG | HEART RATE: 88 BPM

## 2018-02-05 DIAGNOSIS — D3A.00 CARCINOID TUMOR: ICD-10-CM

## 2018-02-05 PROCEDURE — 96372 THER/PROPH/DIAG INJ SC/IM: CPT

## 2018-02-05 PROCEDURE — 6360000002 HC RX W HCPCS: Performed by: INTERNAL MEDICINE

## 2018-02-05 RX ADMIN — OCTREOTIDE ACETATE 30 MG: KIT at 09:32

## 2018-02-05 NOTE — PLAN OF CARE
Problem: Intellectual/Education/Knowledge Deficit  Intervention: Verbal/written education provided  Discussed sandostatin action, possible side effects and when to call the doctor. Goal: Teaching initiated upon admission  Outcome: Met This Shift  Patient verbalizes understanding to verbal information given on sandostatin,action and possible side effects. Aware to call MD if develop complications. Problem: Discharge Planning  Intervention: Interaction with patient/family and care team  Discuss discharge instructions, follow ups, and when to call the doctor. Goal: Knowledge of discharge instructions  Knowledge of discharge instructions    Outcome: Met This Shift  Verbalized understanding of discharge instructions, follow-up appointments, and when to call the physician. Problem: Musculor/Skeletal Functional Status  Intervention: Fall precautions  Discussed fall precautions, call light within reach. Goal: Absence of falls  Outcome: Met This Shift  Patient verbalizes understanding to verbal information given on fall precautions. Patient free from falls this visit. Comments: Care plan reviewed with patient. Patient verbalize understanding of the plan of care and contribute to goal setting.

## 2018-02-25 RX ORDER — DIPHENHYDRAMINE HYDROCHLORIDE 50 MG/ML
50 INJECTION INTRAMUSCULAR; INTRAVENOUS ONCE
Status: CANCELLED | OUTPATIENT
Start: 2018-03-07 | End: 2018-03-07

## 2018-02-25 RX ORDER — METHYLPREDNISOLONE SODIUM SUCCINATE 125 MG/2ML
125 INJECTION, POWDER, LYOPHILIZED, FOR SOLUTION INTRAMUSCULAR; INTRAVENOUS ONCE
Status: CANCELLED | OUTPATIENT
Start: 2018-03-07 | End: 2018-03-07

## 2018-02-25 RX ORDER — 0.9 % SODIUM CHLORIDE 0.9 %
10 VIAL (ML) INJECTION ONCE
Status: CANCELLED | OUTPATIENT
Start: 2018-03-07 | End: 2018-03-07

## 2018-02-25 RX ORDER — EPINEPHRINE 1 MG/ML
0.3 INJECTION, SOLUTION, CONCENTRATE INTRAVENOUS PRN
Status: CANCELLED | OUTPATIENT
Start: 2018-03-07

## 2018-02-25 RX ORDER — SODIUM CHLORIDE 0.9 % (FLUSH) 0.9 %
10 SYRINGE (ML) INJECTION PRN
Status: CANCELLED | OUTPATIENT
Start: 2018-03-07

## 2018-02-25 RX ORDER — HEPARIN SODIUM (PORCINE) LOCK FLUSH IV SOLN 100 UNIT/ML 100 UNIT/ML
500 SOLUTION INTRAVENOUS PRN
Status: CANCELLED | OUTPATIENT
Start: 2018-03-07

## 2018-02-25 RX ORDER — SODIUM CHLORIDE 9 MG/ML
INJECTION, SOLUTION INTRAVENOUS CONTINUOUS
Status: CANCELLED | OUTPATIENT
Start: 2018-03-07

## 2018-03-05 ENCOUNTER — TELEPHONE (OUTPATIENT)
Dept: ONCOLOGY | Age: 83
End: 2018-03-05

## 2018-03-07 ENCOUNTER — HOSPITAL ENCOUNTER (OUTPATIENT)
Dept: INFUSION THERAPY | Age: 83
Discharge: HOME | End: 2018-03-07
Payer: MEDICARE

## 2018-03-07 ENCOUNTER — OFFICE VISIT (OUTPATIENT)
Dept: ONCOLOGY | Age: 83
End: 2018-03-07
Payer: MEDICARE

## 2018-03-07 VITALS
TEMPERATURE: 97.8 F | WEIGHT: 180 LBS | DIASTOLIC BLOOD PRESSURE: 67 MMHG | HEIGHT: 67 IN | BODY MASS INDEX: 28.25 KG/M2 | SYSTOLIC BLOOD PRESSURE: 115 MMHG | RESPIRATION RATE: 18 BRPM | OXYGEN SATURATION: 97 % | HEART RATE: 88 BPM

## 2018-03-07 VITALS
DIASTOLIC BLOOD PRESSURE: 77 MMHG | SYSTOLIC BLOOD PRESSURE: 125 MMHG | OXYGEN SATURATION: 97 % | TEMPERATURE: 98.5 F | BODY MASS INDEX: 28.25 KG/M2 | HEART RATE: 60 BPM | RESPIRATION RATE: 18 BRPM | WEIGHT: 180 LBS | HEIGHT: 67 IN

## 2018-03-07 DIAGNOSIS — Z51.81 ENCOUNTER FOR MONITORING OCTREOTIDE THERAPY: ICD-10-CM

## 2018-03-07 DIAGNOSIS — C64.9 RENAL CANCER, UNSPECIFIED LATERALITY (HCC): ICD-10-CM

## 2018-03-07 DIAGNOSIS — G89.3 CHRONIC PAIN DUE TO NEOPLASM: ICD-10-CM

## 2018-03-07 DIAGNOSIS — D3A.00 CARCINOID TUMOR: ICD-10-CM

## 2018-03-07 DIAGNOSIS — D3A.00 CARCINOID TUMOR: Primary | ICD-10-CM

## 2018-03-07 DIAGNOSIS — Z79.899 ENCOUNTER FOR MONITORING OCTREOTIDE THERAPY: ICD-10-CM

## 2018-03-07 LAB
ALBUMIN SERPL-MCNC: 3.4 G/DL (ref 3.5–5.1)
ALP BLD-CCNC: 140 U/L (ref 38–126)
ALT SERPL-CCNC: 65 U/L (ref 11–66)
AST SERPL-CCNC: 37 U/L (ref 5–40)
BASINOPHIL, AUTOMATED: 0 % (ref 0–12)
BILIRUB SERPL-MCNC: 0.6 MG/DL (ref 0.3–1.2)
BILIRUBIN DIRECT: < 0.2 MG/DL (ref 0–0.3)
BUN, WHOLE BLOOD: 12 MG/DL (ref 8–26)
CHLORIDE, WHOLE BLOOD: 93 MEQ/L (ref 98–109)
CREATININE, WHOLE BLOOD: 1.1 MG/DL (ref 0.5–1.2)
EOSINOPHILS RELATIVE PERCENT: 4 % (ref 0–12)
GFR, ESTIMATED ,CON: 68 ML/MIN/1.73M2
GLUCOSE, WHOLE BLOOD: 174 MG/DL (ref 70–108)
HCT VFR BLD CALC: 37.4 % (ref 42–52)
HEMOGLOBIN: 12.6 GM/DL (ref 14–18)
IONIZED CALCIUM, WHOLE BLOOD: 1.07 MMOL/L (ref 1.12–1.32)
LYMPHOCYTES # BLD: 18 % (ref 15–47)
MCH RBC QN AUTO: 29.7 PG (ref 27–31)
MCHC RBC AUTO-ENTMCNC: 33.6 GM/DL (ref 33–37)
MCV RBC AUTO: 88 FL (ref 80–94)
MONOCYTES: 11 % (ref 0–12)
PDW BLD-RTO: 12.2 % (ref 11.5–14.5)
PLATELET # BLD: 271 THOU/MM3 (ref 130–400)
PMV BLD AUTO: 7.7 FL (ref 7.4–10.4)
POTASSIUM, WHOLE BLOOD: 4.2 MEQ/L (ref 3.5–4.9)
RBC # BLD: 4.24 MILL/MM3 (ref 4.7–6.1)
SEG NEUTROPHILS: 67 % (ref 43–75)
SODIUM, WHOLE BLOOD: 133 MEQ/L (ref 138–146)
TOTAL CO2, WHOLE BLOOD: 29 MEQ/L (ref 23–33)
TOTAL PROTEIN: 6.7 G/DL (ref 6.1–8)
WBC # BLD: 9.5 THOU/MM3 (ref 4.8–10.8)

## 2018-03-07 PROCEDURE — 99214 OFFICE O/P EST MOD 30 MIN: CPT | Performed by: INTERNAL MEDICINE

## 2018-03-07 PROCEDURE — G8484 FLU IMMUNIZE NO ADMIN: HCPCS | Performed by: INTERNAL MEDICINE

## 2018-03-07 PROCEDURE — 85025 COMPLETE CBC W/AUTO DIFF WBC: CPT

## 2018-03-07 PROCEDURE — 1036F TOBACCO NON-USER: CPT | Performed by: INTERNAL MEDICINE

## 2018-03-07 PROCEDURE — 1123F ACP DISCUSS/DSCN MKR DOCD: CPT | Performed by: INTERNAL MEDICINE

## 2018-03-07 PROCEDURE — G8417 CALC BMI ABV UP PARAM F/U: HCPCS | Performed by: INTERNAL MEDICINE

## 2018-03-07 PROCEDURE — G8427 DOCREV CUR MEDS BY ELIG CLIN: HCPCS | Performed by: INTERNAL MEDICINE

## 2018-03-07 PROCEDURE — 80047 BASIC METABLC PNL IONIZED CA: CPT

## 2018-03-07 PROCEDURE — 6360000002 HC RX W HCPCS: Performed by: INTERNAL MEDICINE

## 2018-03-07 PROCEDURE — G0463 HOSPITAL OUTPT CLINIC VISIT: HCPCS

## 2018-03-07 PROCEDURE — G8598 ASA/ANTIPLAT THER USED: HCPCS | Performed by: INTERNAL MEDICINE

## 2018-03-07 PROCEDURE — 4040F PNEUMOC VAC/ADMIN/RCVD: CPT | Performed by: INTERNAL MEDICINE

## 2018-03-07 PROCEDURE — 96372 THER/PROPH/DIAG INJ SC/IM: CPT

## 2018-03-07 PROCEDURE — 36415 COLL VENOUS BLD VENIPUNCTURE: CPT

## 2018-03-07 PROCEDURE — 80076 HEPATIC FUNCTION PANEL: CPT

## 2018-03-07 RX ORDER — METHYLPREDNISOLONE SODIUM SUCCINATE 125 MG/2ML
125 INJECTION, POWDER, LYOPHILIZED, FOR SOLUTION INTRAMUSCULAR; INTRAVENOUS ONCE
Status: CANCELLED | OUTPATIENT
Start: 2018-04-04 | End: 2018-04-04

## 2018-03-07 RX ORDER — 0.9 % SODIUM CHLORIDE 0.9 %
10 VIAL (ML) INJECTION ONCE
Status: CANCELLED | OUTPATIENT
Start: 2018-04-04 | End: 2018-04-04

## 2018-03-07 RX ORDER — HEPARIN SODIUM (PORCINE) LOCK FLUSH IV SOLN 100 UNIT/ML 100 UNIT/ML
500 SOLUTION INTRAVENOUS PRN
Status: CANCELLED | OUTPATIENT
Start: 2018-04-04

## 2018-03-07 RX ORDER — SODIUM CHLORIDE 0.9 % (FLUSH) 0.9 %
10 SYRINGE (ML) INJECTION PRN
Status: CANCELLED | OUTPATIENT
Start: 2018-04-04

## 2018-03-07 RX ORDER — PANTOPRAZOLE SODIUM 20 MG/1
20 TABLET, DELAYED RELEASE ORAL DAILY
COMMUNITY
End: 2018-04-04 | Stop reason: ALTCHOICE

## 2018-03-07 RX ORDER — SODIUM CHLORIDE 9 MG/ML
INJECTION, SOLUTION INTRAVENOUS CONTINUOUS
Status: CANCELLED | OUTPATIENT
Start: 2018-04-04

## 2018-03-07 RX ORDER — DIPHENHYDRAMINE HYDROCHLORIDE 50 MG/ML
50 INJECTION INTRAMUSCULAR; INTRAVENOUS ONCE
Status: CANCELLED | OUTPATIENT
Start: 2018-04-04 | End: 2018-04-04

## 2018-03-07 RX ADMIN — OCTREOTIDE ACETATE 30 MG: KIT at 11:08

## 2018-03-07 NOTE — PROGRESS NOTES
Sandostatin given as charted, tolerated well. Discharged in satisfactory condition. Ambulated off unit per self.

## 2018-03-07 NOTE — PLAN OF CARE
Problem: Intellectual/Education/Knowledge Deficit  Intervention: Verbal/written education provided  Discuss sandostatin,action and possible side effects. Aware to call MD if develop complications. Goal: Teaching initiated upon admission  Outcome: Met This Shift  Patient verbalizes understanding to verbal information given on sandostatin,action and possible side effects. Aware to call MD if develop complications. Problem: Discharge Planning  Intervention: Discharge to appropriate level of care  Discuss discharge instructions, follow ups and when to call doctor. Goal: Knowledge of discharge instructions  Knowledge of discharge instructions    Outcome: Met This Shift  Verbalized understanding of discharge instructions, follow ups and when to call doctor    Problem: Falls - Risk of: Intervention: Assess risk factors for falls  Assess for fall risk, instruct to ask for assistance with ambulation    Goal: Will remain free from falls  Will remain free from falls  Outcome: Met This Shift  Free from falls while in infusion center. Verbalized understanding of fall prevention and to ask for assistance with ambulation    Comments: Care plan reviewed with patient. Patient verbalized understanding of the plan of care and contribute to goal setting.

## 2018-03-15 PROBLEM — G89.3 CHRONIC PAIN DUE TO NEOPLASM: Status: ACTIVE | Noted: 2018-03-15

## 2018-03-15 NOTE — PROGRESS NOTES
[facility]  CANCER NETWORK OF [facility]  [facility] [address] [facility]  [address] [facility] [address] [address]  Dept: [phone]  Dept Fax: [phone]  Loc: [phone]    Subjective:      Chief Complaint: Pily Valdivia is a 80 y.o. male. The patient has been treated for malignant carcinoid syndrome. He was diagnosed in December of 2012 when he presented with abdominal pain. An abdominal CAT scan demonstrated intra-abdominal adenopathy and disease in the liver. He underwent a liver biopsy on November 11, 2012 which confirmed carcinoid tumor. An octreotide scan confirmed disease in the liver and abdominal lymph nodes. At the same time there was a mass noted in the left kidney that was concerning for a second primary malignancy. A CT directed biopsy was canceled by the radiologist and before an open biopsy could be scheduled, and the patient experienced a myocardial infarction. He had three stents placed and takes anti-platelet medication routinely. He subsequently developed problems with urinary retention and was found to have prostatic hypertrophy. He had a PSA of 7. He was advised that he was a candidate for a TURP but could not have this procedure done because of the myocardial infarction. A follow up abdominal CAT scan was completed on October 9, 2013. This demonstrated an increase in the size of lesions in the liver, abdominal lymph nodes and kidney. 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 Fady Ndiaye developed an increase of right upper quadrant and mid abdominal pain in March, 2014. He was also having increasing episodes of diarrhea.  He was referred for an abdominal CAT scan completed on 3/11/2014 that demonstrated enlargement of all areas of hepatic metastasis compared to a CAT scan obtained on well-nourished. No acute distress. HENT: Normocephalic and atraumatic. Eyes: Pupils are equal and reactive. No scleral icterus. Neck: Overall appearance is symmetrical. No identifiable masses. Chest: Inspection and palpation of chest is normal.  Pulmonary: Effort normal. No respiratory distress. Cardiovascular: RRR. No edema in any of the four extremities. Abdominal: Tender with palpation. No hepatomegaly or splenomegaly. Musculoskeletal: Gait is normal. Muscle strength and tone good. Neurological: Alert and oriented to person, place, and time. Judgment and thought content normal.  Skin: Skin is warm and dry. No rash. Psychiatric: Mood and affect appropriate for the clinical situation. Behavior is normal.          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 2. Renal cell carcinoma. 3. Abdominal pain. Plan:   1. Follow up at [facility] for local regional therapy as scheduled. 2.  Continue therapy with octreotide monthly. 3.  Monitor for progression of malignancy. 4.  Continue Percocet for pain control.      Adalgisa Palacio M.D.  [place] [address], [address] [place] [address], [address] [place] [address], [address] [place] [address]

## 2018-04-04 ENCOUNTER — HOSPITAL ENCOUNTER (OUTPATIENT)
Dept: INFUSION THERAPY | Age: 83
Discharge: HOME | End: 2018-04-04
Payer: MEDICARE

## 2018-04-04 VITALS
OXYGEN SATURATION: 98 % | RESPIRATION RATE: 18 BRPM | TEMPERATURE: 97.5 F | BODY MASS INDEX: 27.88 KG/M2 | WEIGHT: 177.6 LBS | HEART RATE: 86 BPM | SYSTOLIC BLOOD PRESSURE: 109 MMHG | DIASTOLIC BLOOD PRESSURE: 60 MMHG | HEIGHT: 67 IN

## 2018-04-04 DIAGNOSIS — D3A.00 CARCINOID TUMOR: ICD-10-CM

## 2018-04-04 PROCEDURE — 6360000002 HC RX W HCPCS: Performed by: INTERNAL MEDICINE

## 2018-04-04 PROCEDURE — 96372 THER/PROPH/DIAG INJ SC/IM: CPT

## 2018-04-04 RX ADMIN — OCTREOTIDE ACETATE 30 MG: KIT at 10:14

## 2018-04-04 NOTE — PLAN OF CARE
Problem: Musculor/Skeletal Functional Status  Intervention: Fall precautions  Discussed fall precautions, call light within reach. Goal: Absence of falls  Outcome: Met This Shift  Patient verbalizes understanding to verbal information given on fall precautions. Patient free from falls this visit. Problem: Intellectual/Education/Knowledge Deficit  Intervention: Verbal/written education provided  Discussed sandostatin action, possible side effects and when to call the doctor. Goal: Teaching initiated upon admission  Outcome: Met This Shift  Patient verbalizes understanding to verbal information given on sandostatin,action and possible side effects. Aware to call MD if develop complications. Problem: Discharge Planning  Intervention: Interaction with patient/family and care team  Discuss discharge instructions, follow ups, and when to call the doctor. Goal: Knowledge of discharge instructions  Knowledge of discharge instructions    Outcome: Met This Shift  Verbalized understanding of discharge instructions, follow-up appointments, and when to call the physician. Comments: Care plan reviewed with patient. Patient verbalize understanding of the plan of care and contribute to goal setting.

## 2018-04-04 NOTE — PROGRESS NOTES
Patient tolerated sandostatin without any complications. Patient verbalizes understanding of discharge instructions, ambulated off unit per self with belongings.

## 2018-04-22 RX ORDER — SODIUM CHLORIDE 0.9 % (FLUSH) 0.9 %
10 SYRINGE (ML) INJECTION PRN
Status: CANCELLED | OUTPATIENT
Start: 2018-05-02

## 2018-04-22 RX ORDER — SODIUM CHLORIDE 9 MG/ML
INJECTION, SOLUTION INTRAVENOUS CONTINUOUS
Status: CANCELLED | OUTPATIENT
Start: 2018-05-02

## 2018-04-22 RX ORDER — EPINEPHRINE 1 MG/ML
0.3 INJECTION, SOLUTION, CONCENTRATE INTRAVENOUS PRN
Status: CANCELLED | OUTPATIENT
Start: 2018-05-02

## 2018-04-22 RX ORDER — 0.9 % SODIUM CHLORIDE 0.9 %
10 VIAL (ML) INJECTION ONCE
Status: CANCELLED | OUTPATIENT
Start: 2018-05-02 | End: 2018-05-02

## 2018-04-22 RX ORDER — DIPHENHYDRAMINE HYDROCHLORIDE 50 MG/ML
50 INJECTION INTRAMUSCULAR; INTRAVENOUS ONCE
Status: CANCELLED | OUTPATIENT
Start: 2018-05-02 | End: 2018-05-02

## 2018-04-22 RX ORDER — HEPARIN SODIUM (PORCINE) LOCK FLUSH IV SOLN 100 UNIT/ML 100 UNIT/ML
500 SOLUTION INTRAVENOUS PRN
Status: CANCELLED | OUTPATIENT
Start: 2018-05-02

## 2018-04-22 RX ORDER — METHYLPREDNISOLONE SODIUM SUCCINATE 125 MG/2ML
125 INJECTION, POWDER, LYOPHILIZED, FOR SOLUTION INTRAMUSCULAR; INTRAVENOUS ONCE
Status: CANCELLED | OUTPATIENT
Start: 2018-05-02 | End: 2018-05-02

## 2018-05-02 ENCOUNTER — HOSPITAL ENCOUNTER (OUTPATIENT)
Dept: INFUSION THERAPY | Age: 83
Discharge: HOME | End: 2018-05-02
Payer: MEDICARE

## 2018-05-02 ENCOUNTER — OFFICE VISIT (OUTPATIENT)
Dept: ONCOLOGY | Age: 83
End: 2018-05-02
Payer: MEDICARE

## 2018-05-02 VITALS
HEIGHT: 67 IN | OXYGEN SATURATION: 100 % | WEIGHT: 178.8 LBS | HEART RATE: 74 BPM | BODY MASS INDEX: 28.06 KG/M2 | DIASTOLIC BLOOD PRESSURE: 67 MMHG | SYSTOLIC BLOOD PRESSURE: 126 MMHG | TEMPERATURE: 97.9 F | RESPIRATION RATE: 18 BRPM

## 2018-05-02 DIAGNOSIS — D3A.00 CARCINOID TUMOR: ICD-10-CM

## 2018-05-02 DIAGNOSIS — G89.3 CHRONIC PAIN DUE TO NEOPLASM: ICD-10-CM

## 2018-05-02 DIAGNOSIS — D64.9 CHRONIC ANEMIA: ICD-10-CM

## 2018-05-02 DIAGNOSIS — D3A.00 CARCINOID TUMOR: Primary | ICD-10-CM

## 2018-05-02 LAB
ALBUMIN SERPL-MCNC: 3.4 G/DL (ref 3.5–5.1)
ALP BLD-CCNC: 229 U/L (ref 38–126)
ALT SERPL-CCNC: 26 U/L (ref 11–66)
AST SERPL-CCNC: 27 U/L (ref 5–40)
BASINOPHIL, AUTOMATED: 0 % (ref 0–3)
BILIRUB SERPL-MCNC: 0.4 MG/DL (ref 0.3–1.2)
BILIRUBIN DIRECT: < 0.2 MG/DL (ref 0–0.3)
BUN, WHOLE BLOOD: 15 MG/DL (ref 8–26)
CHLORIDE, WHOLE BLOOD: 100 MEQ/L (ref 98–109)
CREATININE, WHOLE BLOOD: 0.9 MG/DL (ref 0.5–1.2)
EOSINOPHILS RELATIVE PERCENT: 7 % (ref 0–4)
GFR, ESTIMATED ,CON: 85 ML/MIN/1.73M2
GLUCOSE, WHOLE BLOOD: 184 MG/DL (ref 70–108)
HCT VFR BLD CALC: 38 % (ref 42–52)
HEMOGLOBIN: 12.5 GM/DL (ref 14–18)
IONIZED CALCIUM, WHOLE BLOOD: 1.11 MMOL/L (ref 1.12–1.32)
LYMPHOCYTES # BLD: 28 % (ref 15–47)
MCH RBC QN AUTO: 28.9 PG (ref 27–31)
MCHC RBC AUTO-ENTMCNC: 32.9 GM/DL (ref 33–37)
MCV RBC AUTO: 88 FL (ref 80–94)
MONOCYTES: 7 % (ref 0–12)
PDW BLD-RTO: 14.7 % (ref 11.5–14.5)
PLATELET # BLD: 194 THOU/MM3 (ref 130–400)
PMV BLD AUTO: 8.7 FL (ref 7.4–10.4)
POTASSIUM, WHOLE BLOOD: 3.9 MEQ/L (ref 3.5–4.9)
RBC # BLD: 4.32 MILL/MM3 (ref 4.7–6.1)
SEG NEUTROPHILS: 58 % (ref 43–75)
SODIUM, WHOLE BLOOD: 137 MEQ/L (ref 138–146)
TOTAL CO2, WHOLE BLOOD: 25 MEQ/L (ref 23–33)
TOTAL PROTEIN: 6.7 G/DL (ref 6.1–8)
WBC # BLD: 8 THOU/MM3 (ref 4.8–10.8)

## 2018-05-02 PROCEDURE — 96372 THER/PROPH/DIAG INJ SC/IM: CPT

## 2018-05-02 PROCEDURE — 36415 COLL VENOUS BLD VENIPUNCTURE: CPT

## 2018-05-02 PROCEDURE — G8417 CALC BMI ABV UP PARAM F/U: HCPCS | Performed by: INTERNAL MEDICINE

## 2018-05-02 PROCEDURE — 4040F PNEUMOC VAC/ADMIN/RCVD: CPT | Performed by: INTERNAL MEDICINE

## 2018-05-02 PROCEDURE — 1036F TOBACCO NON-USER: CPT | Performed by: INTERNAL MEDICINE

## 2018-05-02 PROCEDURE — 99215 OFFICE O/P EST HI 40 MIN: CPT | Performed by: INTERNAL MEDICINE

## 2018-05-02 PROCEDURE — G8598 ASA/ANTIPLAT THER USED: HCPCS | Performed by: INTERNAL MEDICINE

## 2018-05-02 PROCEDURE — 80076 HEPATIC FUNCTION PANEL: CPT

## 2018-05-02 PROCEDURE — 85025 COMPLETE CBC W/AUTO DIFF WBC: CPT

## 2018-05-02 PROCEDURE — G8427 DOCREV CUR MEDS BY ELIG CLIN: HCPCS | Performed by: INTERNAL MEDICINE

## 2018-05-02 PROCEDURE — 6360000002 HC RX W HCPCS: Performed by: INTERNAL MEDICINE

## 2018-05-02 PROCEDURE — 1123F ACP DISCUSS/DSCN MKR DOCD: CPT | Performed by: INTERNAL MEDICINE

## 2018-05-02 PROCEDURE — 80047 BASIC METABLC PNL IONIZED CA: CPT

## 2018-05-02 PROCEDURE — G0463 HOSPITAL OUTPT CLINIC VISIT: HCPCS

## 2018-05-02 RX ADMIN — OCTREOTIDE ACETATE 30 MG: KIT at 09:54

## 2018-05-02 ASSESSMENT — PAIN SCALES - GENERAL: PAINLEVEL_OUTOF10: 0

## 2018-05-02 NOTE — PROGRESS NOTES
Pt discharged in stable condition with verbalization of discharge instructions all questions answered and all  belongings sent with patient. Patient tolerated sandostatin lar without any complications or signs of a reaction.

## 2018-05-02 NOTE — PLAN OF CARE
Problem: Musculor/Skeletal Functional Status  Intervention: Fall precautions  Patient aware of fall precautions for here and at home -call light in reach while here    Goal: Absence of falls  Outcome: Met This Shift  No falls this admission    Problem: Intellectual/Education/Knowledge Deficit  Intervention: Verbal/written education provided  Discharge home    Goal: Teaching initiated upon admission  Outcome: Met This Shift  Reviewed sandostatin lar with patient, patient offered no questions or concerns. Patient verbalized understanding of drug being administered. Goal: Written Disposition Instruction form completed  Outcome: Met This Shift  Discharge instructions given and reviewed with patient. All questions answered. Patient verbalized understanding    Problem: Discharge Planning  Intervention: Interaction with patient/family and care team  Patient currently denies any needs or concerns   Intervention: Discharge to appropriate level of care  Discharge home    Goal: Knowledge of discharge instructions  Knowledge of discharge instructions    Outcome: Met This Shift  Patient  able to teach back follow up appointments and when to call the doctor. Patient offers no questions at this time    Comments: Care plan reviewed with patient and he verbalized understanding of the plan of care and contributed to goal setting.

## 2018-05-12 PROBLEM — D64.9 CHRONIC ANEMIA: Status: ACTIVE | Noted: 2018-05-12

## 2018-05-29 RX ORDER — DIPHENHYDRAMINE HYDROCHLORIDE 50 MG/ML
50 INJECTION INTRAMUSCULAR; INTRAVENOUS ONCE
Status: CANCELLED | OUTPATIENT
Start: 2018-05-30 | End: 2018-05-30

## 2018-05-29 RX ORDER — SODIUM CHLORIDE 0.9 % (FLUSH) 0.9 %
10 SYRINGE (ML) INJECTION PRN
Status: CANCELLED | OUTPATIENT
Start: 2018-05-30

## 2018-05-29 RX ORDER — 0.9 % SODIUM CHLORIDE 0.9 %
10 VIAL (ML) INJECTION ONCE
Status: CANCELLED | OUTPATIENT
Start: 2018-05-30 | End: 2018-05-30

## 2018-05-29 RX ORDER — SODIUM CHLORIDE 9 MG/ML
INJECTION, SOLUTION INTRAVENOUS CONTINUOUS
Status: CANCELLED | OUTPATIENT
Start: 2018-05-30

## 2018-05-29 RX ORDER — METHYLPREDNISOLONE SODIUM SUCCINATE 125 MG/2ML
125 INJECTION, POWDER, LYOPHILIZED, FOR SOLUTION INTRAMUSCULAR; INTRAVENOUS ONCE
Status: CANCELLED | OUTPATIENT
Start: 2018-05-30 | End: 2018-05-30

## 2018-05-29 RX ORDER — HEPARIN SODIUM (PORCINE) LOCK FLUSH IV SOLN 100 UNIT/ML 100 UNIT/ML
500 SOLUTION INTRAVENOUS PRN
Status: CANCELLED | OUTPATIENT
Start: 2018-05-30

## 2018-05-29 RX ORDER — EPINEPHRINE 1 MG/ML
0.3 INJECTION, SOLUTION, CONCENTRATE INTRAVENOUS PRN
Status: CANCELLED | OUTPATIENT
Start: 2018-05-30

## 2018-05-30 ENCOUNTER — HOSPITAL ENCOUNTER (OUTPATIENT)
Dept: INFUSION THERAPY | Age: 83
Discharge: HOME | End: 2018-05-30
Payer: MEDICARE

## 2018-05-30 VITALS
BODY MASS INDEX: 26.96 KG/M2 | HEART RATE: 72 BPM | TEMPERATURE: 97.3 F | SYSTOLIC BLOOD PRESSURE: 124 MMHG | WEIGHT: 172.2 LBS | RESPIRATION RATE: 18 BRPM | DIASTOLIC BLOOD PRESSURE: 67 MMHG | OXYGEN SATURATION: 98 %

## 2018-05-30 DIAGNOSIS — D3A.00 CARCINOID TUMOR: ICD-10-CM

## 2018-05-30 LAB
ALBUMIN SERPL-MCNC: 3.6 G/DL (ref 3.5–5.1)
ALP BLD-CCNC: 336 U/L (ref 38–126)
ALT SERPL-CCNC: 36 U/L (ref 11–66)
AST SERPL-CCNC: 34 U/L (ref 5–40)
BASINOPHIL, AUTOMATED: 0 % (ref 0–3)
BILIRUB SERPL-MCNC: 0.4 MG/DL (ref 0.3–1.2)
BILIRUBIN DIRECT: < 0.2 MG/DL (ref 0–0.3)
BUN, WHOLE BLOOD: 16 MG/DL (ref 8–26)
CHLORIDE, WHOLE BLOOD: 102 MEQ/L (ref 98–109)
CREATININE, WHOLE BLOOD: 1 MG/DL (ref 0.5–1.2)
EOSINOPHILS RELATIVE PERCENT: 7 % (ref 0–4)
GFR, ESTIMATED ,CON: 75 ML/MIN/1.73M2
GLUCOSE, WHOLE BLOOD: 107 MG/DL (ref 70–108)
HCT VFR BLD CALC: 39.3 % (ref 42–52)
HEMOGLOBIN: 13.3 GM/DL (ref 14–18)
IONIZED CALCIUM, WHOLE BLOOD: 1.14 MMOL/L (ref 1.12–1.32)
LYMPHOCYTES # BLD: 28 % (ref 15–47)
MCH RBC QN AUTO: 29.6 PG (ref 27–31)
MCHC RBC AUTO-ENTMCNC: 33.9 GM/DL (ref 33–37)
MCV RBC AUTO: 87 FL (ref 80–94)
MONOCYTES: 7 % (ref 0–12)
PDW BLD-RTO: 14 % (ref 11.5–14.5)
PLATELET # BLD: 221 THOU/MM3 (ref 130–400)
PMV BLD AUTO: 9.3 FL (ref 7.4–10.4)
POTASSIUM, WHOLE BLOOD: 4.2 MEQ/L (ref 3.5–4.9)
RBC # BLD: 4.5 MILL/MM3 (ref 4.7–6.1)
SEG NEUTROPHILS: 58 % (ref 43–75)
SODIUM, WHOLE BLOOD: 139 MEQ/L (ref 138–146)
TOTAL CO2, WHOLE BLOOD: 28 MEQ/L (ref 23–33)
TOTAL PROTEIN: 6.8 G/DL (ref 6.1–8)
WBC # BLD: 7.8 THOU/MM3 (ref 4.8–10.8)

## 2018-05-30 PROCEDURE — 86316 IMMUNOASSAY TUMOR OTHER: CPT

## 2018-05-30 PROCEDURE — 6360000002 HC RX W HCPCS: Performed by: INTERNAL MEDICINE

## 2018-05-30 PROCEDURE — 36415 COLL VENOUS BLD VENIPUNCTURE: CPT

## 2018-05-30 PROCEDURE — 96372 THER/PROPH/DIAG INJ SC/IM: CPT

## 2018-05-30 PROCEDURE — 85025 COMPLETE CBC W/AUTO DIFF WBC: CPT

## 2018-05-30 PROCEDURE — 80047 BASIC METABLC PNL IONIZED CA: CPT

## 2018-05-30 PROCEDURE — 80076 HEPATIC FUNCTION PANEL: CPT

## 2018-05-30 RX ADMIN — OCTREOTIDE ACETATE 30 MG: KIT at 11:01

## 2018-05-30 ASSESSMENT — PAIN SCALES - GENERAL
PAINLEVEL_OUTOF10: 0
PAINLEVEL_OUTOF10: 0

## 2018-05-30 NOTE — PLAN OF CARE
Problem: Musculor/Skeletal Functional Status  Intervention: Fall precautions  Patient aware of fall precautions for here and at home -call light in reach while here    Goal: Absence of falls  Outcome: Met This Shift  No falls this admission     Problem: Discharge Planning  Intervention: Interaction with patient/family and care team  Patient currently denies any need or concerns   Intervention: Discharge to appropriate level of care  Discharge home    Goal: Knowledge of discharge instructions  Knowledge of discharge instructions    Outcome: Met This Shift  Patient  able to teach back follow up appointments and when to call the doctor. Patient offers no questions at this time    Problem: Intellectual/Education/Knowledge Deficit  Intervention: Verbal/written education provided  Discharge instruction sheets    Goal: Teaching initiated upon admission  Outcome: Met This Shift  Reviewed sandostatin lar with patient, patient offered no questions or concerns. Patient verbalized understanding of drug being administered. Goal: Written Disposition Instruction form completed  Outcome: Met This Shift  Discharge instructions given and reviewed with patient. All questions answered. Patient verbalized understanding    Comments: Care plan reviewed with patient and he verbalized understanding of the plan of care and contributed to goal setting.

## 2018-06-03 LAB — CHROMOGRANIN A: 1824 NG/ML (ref 0–95)

## 2018-06-21 DIAGNOSIS — D3A.00 CARCINOID TUMOR: ICD-10-CM

## 2018-06-27 ENCOUNTER — HOSPITAL ENCOUNTER (OUTPATIENT)
Dept: INFUSION THERAPY | Age: 83
Discharge: HOME | End: 2018-06-27
Payer: MEDICARE

## 2018-06-27 ENCOUNTER — OFFICE VISIT (OUTPATIENT)
Dept: ONCOLOGY | Age: 83
End: 2018-06-27
Payer: MEDICARE

## 2018-06-27 VITALS
OXYGEN SATURATION: 99 % | HEIGHT: 67 IN | RESPIRATION RATE: 18 BRPM | HEART RATE: 69 BPM | DIASTOLIC BLOOD PRESSURE: 72 MMHG | BODY MASS INDEX: 27.78 KG/M2 | WEIGHT: 177 LBS | SYSTOLIC BLOOD PRESSURE: 132 MMHG | TEMPERATURE: 97.5 F

## 2018-06-27 VITALS
HEART RATE: 69 BPM | OXYGEN SATURATION: 99 % | DIASTOLIC BLOOD PRESSURE: 67 MMHG | BODY MASS INDEX: 27.72 KG/M2 | SYSTOLIC BLOOD PRESSURE: 132 MMHG | RESPIRATION RATE: 18 BRPM | TEMPERATURE: 97.5 F | WEIGHT: 177 LBS

## 2018-06-27 DIAGNOSIS — D3A.00 CARCINOID TUMOR: ICD-10-CM

## 2018-06-27 DIAGNOSIS — Z79.899 ENCOUNTER FOR MONITORING OCTREOTIDE THERAPY: ICD-10-CM

## 2018-06-27 DIAGNOSIS — C64.9 MALIGNANT NEOPLASM OF KIDNEY, UNSPECIFIED LATERALITY (HCC): Primary | ICD-10-CM

## 2018-06-27 DIAGNOSIS — Z51.81 ENCOUNTER FOR MONITORING OCTREOTIDE THERAPY: ICD-10-CM

## 2018-06-27 LAB
ALBUMIN SERPL-MCNC: 3.7 G/DL (ref 3.5–5.1)
ALP BLD-CCNC: 249 U/L (ref 38–126)
ALT SERPL-CCNC: 24 U/L (ref 11–66)
AST SERPL-CCNC: 24 U/L (ref 5–40)
BASINOPHIL, AUTOMATED: 0 % (ref 0–3)
BILIRUB SERPL-MCNC: 0.4 MG/DL (ref 0.3–1.2)
BILIRUBIN DIRECT: < 0.2 MG/DL (ref 0–0.3)
BUN, WHOLE BLOOD: 14 MG/DL (ref 8–26)
CHLORIDE, WHOLE BLOOD: 100 MEQ/L (ref 98–109)
CREATININE, WHOLE BLOOD: 1 MG/DL (ref 0.5–1.2)
EOSINOPHILS RELATIVE PERCENT: 10 % (ref 0–4)
GFR, ESTIMATED ,CON: 75 ML/MIN/1.73M2
GLUCOSE, WHOLE BLOOD: 167 MG/DL (ref 70–108)
HCT VFR BLD CALC: 40.8 % (ref 42–52)
HEMOGLOBIN: 14 GM/DL (ref 14–18)
IONIZED CALCIUM, WHOLE BLOOD: 1.13 MMOL/L (ref 1.12–1.32)
LYMPHOCYTES # BLD: 29 % (ref 15–47)
MCH RBC QN AUTO: 29.9 PG (ref 27–31)
MCHC RBC AUTO-ENTMCNC: 34.2 GM/DL (ref 33–37)
MCV RBC AUTO: 87 FL (ref 80–94)
MONOCYTES: 6 % (ref 0–12)
PDW BLD-RTO: 13.5 % (ref 11.5–14.5)
PLATELET # BLD: 169 THOU/MM3 (ref 130–400)
PMV BLD AUTO: 9.1 FL (ref 7.4–10.4)
POTASSIUM, WHOLE BLOOD: 4.1 MEQ/L (ref 3.5–4.9)
RBC # BLD: 4.67 MILL/MM3 (ref 4.7–6.1)
SEG NEUTROPHILS: 55 % (ref 43–75)
SODIUM, WHOLE BLOOD: 140 MEQ/L (ref 138–146)
TOTAL CO2, WHOLE BLOOD: 26 MEQ/L (ref 23–33)
TOTAL PROTEIN: 6.6 G/DL (ref 6.1–8)
WBC # BLD: 8.1 THOU/MM3 (ref 4.8–10.8)

## 2018-06-27 PROCEDURE — 36415 COLL VENOUS BLD VENIPUNCTURE: CPT

## 2018-06-27 PROCEDURE — 1036F TOBACCO NON-USER: CPT | Performed by: INTERNAL MEDICINE

## 2018-06-27 PROCEDURE — 1123F ACP DISCUSS/DSCN MKR DOCD: CPT | Performed by: INTERNAL MEDICINE

## 2018-06-27 PROCEDURE — G8427 DOCREV CUR MEDS BY ELIG CLIN: HCPCS | Performed by: INTERNAL MEDICINE

## 2018-06-27 PROCEDURE — 96372 THER/PROPH/DIAG INJ SC/IM: CPT

## 2018-06-27 PROCEDURE — 99215 OFFICE O/P EST HI 40 MIN: CPT | Performed by: INTERNAL MEDICINE

## 2018-06-27 PROCEDURE — G8598 ASA/ANTIPLAT THER USED: HCPCS | Performed by: INTERNAL MEDICINE

## 2018-06-27 PROCEDURE — G0463 HOSPITAL OUTPT CLINIC VISIT: HCPCS

## 2018-06-27 PROCEDURE — 4040F PNEUMOC VAC/ADMIN/RCVD: CPT | Performed by: INTERNAL MEDICINE

## 2018-06-27 PROCEDURE — G8417 CALC BMI ABV UP PARAM F/U: HCPCS | Performed by: INTERNAL MEDICINE

## 2018-06-27 PROCEDURE — 85025 COMPLETE CBC W/AUTO DIFF WBC: CPT

## 2018-06-27 PROCEDURE — 80076 HEPATIC FUNCTION PANEL: CPT

## 2018-06-27 PROCEDURE — 6360000002 HC RX W HCPCS: Performed by: INTERNAL MEDICINE

## 2018-06-27 PROCEDURE — 86316 IMMUNOASSAY TUMOR OTHER: CPT

## 2018-06-27 PROCEDURE — 80047 BASIC METABLC PNL IONIZED CA: CPT

## 2018-06-27 RX ORDER — METHYLPREDNISOLONE SODIUM SUCCINATE 125 MG/2ML
125 INJECTION, POWDER, LYOPHILIZED, FOR SOLUTION INTRAMUSCULAR; INTRAVENOUS ONCE
Status: CANCELLED | OUTPATIENT
Start: 2018-06-27 | End: 2018-06-27

## 2018-06-27 RX ORDER — 0.9 % SODIUM CHLORIDE 0.9 %
10 VIAL (ML) INJECTION ONCE
Status: CANCELLED | OUTPATIENT
Start: 2018-07-25 | End: 2018-07-25

## 2018-06-27 RX ORDER — 0.9 % SODIUM CHLORIDE 0.9 %
10 VIAL (ML) INJECTION ONCE
Status: CANCELLED | OUTPATIENT
Start: 2018-06-27 | End: 2018-06-27

## 2018-06-27 RX ORDER — DIPHENHYDRAMINE HYDROCHLORIDE 50 MG/ML
50 INJECTION INTRAMUSCULAR; INTRAVENOUS ONCE
Status: CANCELLED | OUTPATIENT
Start: 2018-06-27 | End: 2018-06-27

## 2018-06-27 RX ORDER — DIPHENHYDRAMINE HYDROCHLORIDE 50 MG/ML
50 INJECTION INTRAMUSCULAR; INTRAVENOUS ONCE
Status: CANCELLED | OUTPATIENT
Start: 2018-07-25 | End: 2018-07-25

## 2018-06-27 RX ORDER — SODIUM CHLORIDE 9 MG/ML
INJECTION, SOLUTION INTRAVENOUS CONTINUOUS
Status: CANCELLED | OUTPATIENT
Start: 2018-07-25

## 2018-06-27 RX ORDER — SODIUM CHLORIDE 0.9 % (FLUSH) 0.9 %
10 SYRINGE (ML) INJECTION PRN
Status: CANCELLED | OUTPATIENT
Start: 2018-06-27

## 2018-06-27 RX ORDER — METHYLPREDNISOLONE SODIUM SUCCINATE 125 MG/2ML
125 INJECTION, POWDER, LYOPHILIZED, FOR SOLUTION INTRAMUSCULAR; INTRAVENOUS ONCE
Status: CANCELLED | OUTPATIENT
Start: 2018-07-25 | End: 2018-07-25

## 2018-06-27 RX ORDER — SODIUM CHLORIDE 0.9 % (FLUSH) 0.9 %
10 SYRINGE (ML) INJECTION PRN
Status: CANCELLED | OUTPATIENT
Start: 2018-07-25

## 2018-06-27 RX ORDER — HEPARIN SODIUM (PORCINE) LOCK FLUSH IV SOLN 100 UNIT/ML 100 UNIT/ML
500 SOLUTION INTRAVENOUS PRN
Status: CANCELLED | OUTPATIENT
Start: 2018-07-25

## 2018-06-27 RX ORDER — HEPARIN SODIUM (PORCINE) LOCK FLUSH IV SOLN 100 UNIT/ML 100 UNIT/ML
500 SOLUTION INTRAVENOUS PRN
Status: CANCELLED | OUTPATIENT
Start: 2018-06-27

## 2018-06-27 RX ORDER — SODIUM CHLORIDE 9 MG/ML
INJECTION, SOLUTION INTRAVENOUS CONTINUOUS
Status: CANCELLED | OUTPATIENT
Start: 2018-06-27

## 2018-06-27 RX ADMIN — OCTREOTIDE ACETATE 30 MG: KIT at 09:35

## 2018-06-27 ASSESSMENT — PAIN SCALES - GENERAL: PAINLEVEL_OUTOF10: 0

## 2018-06-30 LAB — CHROMOGRANIN A: 1921 NG/ML (ref 0–95)

## 2018-07-25 ENCOUNTER — HOSPITAL ENCOUNTER (OUTPATIENT)
Dept: INFUSION THERAPY | Age: 83
Discharge: HOME | End: 2018-07-25
Payer: MEDICARE

## 2018-07-25 VITALS
HEART RATE: 64 BPM | BODY MASS INDEX: 26.98 KG/M2 | DIASTOLIC BLOOD PRESSURE: 67 MMHG | WEIGHT: 178 LBS | TEMPERATURE: 98.1 F | SYSTOLIC BLOOD PRESSURE: 119 MMHG | OXYGEN SATURATION: 98 % | RESPIRATION RATE: 18 BRPM | HEIGHT: 68 IN

## 2018-07-25 DIAGNOSIS — C64.9 MALIGNANT NEOPLASM OF KIDNEY, UNSPECIFIED LATERALITY (HCC): ICD-10-CM

## 2018-07-25 DIAGNOSIS — D3A.00 CARCINOID TUMOR: ICD-10-CM

## 2018-07-25 LAB
ALBUMIN SERPL-MCNC: 3.4 G/DL (ref 3.5–5.1)
ALP BLD-CCNC: 228 U/L (ref 38–126)
ALT SERPL-CCNC: 24 U/L (ref 11–66)
AST SERPL-CCNC: 26 U/L (ref 5–40)
BASINOPHIL, AUTOMATED: 0 % (ref 0–3)
BILIRUB SERPL-MCNC: 0.4 MG/DL (ref 0.3–1.2)
BILIRUBIN DIRECT: < 0.2 MG/DL (ref 0–0.3)
BUN, WHOLE BLOOD: 14 MG/DL (ref 8–26)
CHLORIDE, WHOLE BLOOD: 100 MEQ/L (ref 98–109)
CREATININE, WHOLE BLOOD: 1 MG/DL (ref 0.5–1.2)
EOSINOPHILS RELATIVE PERCENT: 6 % (ref 0–4)
GFR, ESTIMATED ,CON: 75 ML/MIN/1.73M2
GLUCOSE, WHOLE BLOOD: 156 MG/DL (ref 70–108)
HCT VFR BLD CALC: 39.2 % (ref 42–52)
HEMOGLOBIN: 12.8 GM/DL (ref 14–18)
IONIZED CALCIUM, WHOLE BLOOD: 1.17 MMOL/L (ref 1.12–1.32)
LYMPHOCYTES # BLD: 31 % (ref 15–47)
MCH RBC QN AUTO: 28.5 PG (ref 27–31)
MCHC RBC AUTO-ENTMCNC: 32.6 GM/DL (ref 33–37)
MCV RBC AUTO: 87 FL (ref 80–94)
MONOCYTES: 6 % (ref 0–12)
PDW BLD-RTO: 13.5 % (ref 11.5–14.5)
PLATELET # BLD: 163 THOU/MM3 (ref 130–400)
PMV BLD AUTO: 9.2 FL (ref 7.4–10.4)
POTASSIUM, WHOLE BLOOD: 4.1 MEQ/L (ref 3.5–4.9)
RBC # BLD: 4.49 MILL/MM3 (ref 4.7–6.1)
SEG NEUTROPHILS: 56 % (ref 43–75)
SODIUM, WHOLE BLOOD: 141 MEQ/L (ref 138–146)
TOTAL CO2, WHOLE BLOOD: 29 MEQ/L (ref 23–33)
TOTAL PROTEIN: 6.4 G/DL (ref 6.1–8)
WBC # BLD: 7.4 THOU/MM3 (ref 4.8–10.8)

## 2018-07-25 PROCEDURE — 80047 BASIC METABLC PNL IONIZED CA: CPT

## 2018-07-25 PROCEDURE — 36415 COLL VENOUS BLD VENIPUNCTURE: CPT

## 2018-07-25 PROCEDURE — 96372 THER/PROPH/DIAG INJ SC/IM: CPT

## 2018-07-25 PROCEDURE — 6360000002 HC RX W HCPCS: Performed by: INTERNAL MEDICINE

## 2018-07-25 PROCEDURE — 80076 HEPATIC FUNCTION PANEL: CPT

## 2018-07-25 PROCEDURE — 85025 COMPLETE CBC W/AUTO DIFF WBC: CPT

## 2018-07-25 RX ADMIN — OCTREOTIDE ACETATE 30 MG: KIT at 14:03

## 2018-07-25 ASSESSMENT — PAIN SCALES - GENERAL: PAINLEVEL_OUTOF10: 0

## 2018-07-25 NOTE — PLAN OF CARE
Problem: Intellectual/Education/Knowledge Deficit  Intervention: Verbal/written education provided  Sandostatin LAR reviewed, patient verbalizes understanding of medication being administered    Goal: Teaching initiated upon admission  Outcome: Met This Shift      Problem: Discharge Planning  Intervention: Discharge to appropriate level of care  Patient verbalizes understanding of discharge instructions, follow up appointment, and when to call physician if needed    Goal: Knowledge of discharge instructions  Knowledge of discharge instructions     Outcome: Met This Shift      Comments: Patient verbalizes understanding of discharge instructions, follow up appointment, and when to call physician if needed

## 2018-08-19 RX ORDER — SODIUM CHLORIDE 9 MG/ML
INJECTION, SOLUTION INTRAVENOUS CONTINUOUS
Status: CANCELLED | OUTPATIENT
Start: 2018-08-22

## 2018-08-19 RX ORDER — HEPARIN SODIUM (PORCINE) LOCK FLUSH IV SOLN 100 UNIT/ML 100 UNIT/ML
500 SOLUTION INTRAVENOUS PRN
Status: CANCELLED | OUTPATIENT
Start: 2018-08-22

## 2018-08-19 RX ORDER — DIPHENHYDRAMINE HYDROCHLORIDE 50 MG/ML
50 INJECTION INTRAMUSCULAR; INTRAVENOUS ONCE
Status: CANCELLED | OUTPATIENT
Start: 2018-08-22 | End: 2018-08-22

## 2018-08-19 RX ORDER — EPINEPHRINE 1 MG/ML
0.3 INJECTION, SOLUTION, CONCENTRATE INTRAVENOUS PRN
Status: CANCELLED | OUTPATIENT
Start: 2018-08-22

## 2018-08-19 RX ORDER — SODIUM CHLORIDE 0.9 % (FLUSH) 0.9 %
10 SYRINGE (ML) INJECTION PRN
Status: CANCELLED | OUTPATIENT
Start: 2018-08-22

## 2018-08-19 RX ORDER — 0.9 % SODIUM CHLORIDE 0.9 %
10 VIAL (ML) INJECTION ONCE
Status: CANCELLED | OUTPATIENT
Start: 2018-08-22 | End: 2018-08-22

## 2018-08-19 RX ORDER — METHYLPREDNISOLONE SODIUM SUCCINATE 125 MG/2ML
125 INJECTION, POWDER, LYOPHILIZED, FOR SOLUTION INTRAMUSCULAR; INTRAVENOUS ONCE
Status: CANCELLED | OUTPATIENT
Start: 2018-08-22 | End: 2018-08-22

## 2018-08-22 ENCOUNTER — HOSPITAL ENCOUNTER (OUTPATIENT)
Dept: INFUSION THERAPY | Age: 83
Discharge: HOME | End: 2018-08-22
Payer: MEDICARE

## 2018-08-22 ENCOUNTER — OFFICE VISIT (OUTPATIENT)
Dept: ONCOLOGY | Age: 83
End: 2018-08-22
Payer: MEDICARE

## 2018-08-22 VITALS
BODY MASS INDEX: 26.76 KG/M2 | DIASTOLIC BLOOD PRESSURE: 64 MMHG | SYSTOLIC BLOOD PRESSURE: 118 MMHG | HEART RATE: 63 BPM | RESPIRATION RATE: 18 BRPM | TEMPERATURE: 97.7 F | HEIGHT: 68 IN | OXYGEN SATURATION: 98 % | WEIGHT: 176.6 LBS

## 2018-08-22 VITALS
HEART RATE: 61 BPM | OXYGEN SATURATION: 99 % | HEIGHT: 68 IN | SYSTOLIC BLOOD PRESSURE: 136 MMHG | TEMPERATURE: 97.8 F | DIASTOLIC BLOOD PRESSURE: 60 MMHG | BODY MASS INDEX: 26.76 KG/M2 | WEIGHT: 176.6 LBS | RESPIRATION RATE: 16 BRPM

## 2018-08-22 DIAGNOSIS — Z51.81 ENCOUNTER FOR MONITORING OCTREOTIDE THERAPY: ICD-10-CM

## 2018-08-22 DIAGNOSIS — D3A.00 CARCINOID TUMOR: ICD-10-CM

## 2018-08-22 DIAGNOSIS — Z51.81 ENCOUNTER FOR MONITORING OCTREOTIDE THERAPY: Primary | ICD-10-CM

## 2018-08-22 DIAGNOSIS — C64.9 MALIGNANT NEOPLASM OF KIDNEY, UNSPECIFIED LATERALITY (HCC): ICD-10-CM

## 2018-08-22 DIAGNOSIS — Z79.899 ENCOUNTER FOR MONITORING OCTREOTIDE THERAPY: Primary | ICD-10-CM

## 2018-08-22 DIAGNOSIS — Z79.899 ENCOUNTER FOR MONITORING OCTREOTIDE THERAPY: ICD-10-CM

## 2018-08-22 LAB
ALBUMIN SERPL-MCNC: 3.6 G/DL (ref 3.5–5.1)
ALP BLD-CCNC: 218 U/L (ref 38–126)
ALT SERPL-CCNC: 23 U/L (ref 11–66)
AST SERPL-CCNC: 29 U/L (ref 5–40)
BASINOPHIL, AUTOMATED: 0 % (ref 0–3)
BILIRUB SERPL-MCNC: 0.5 MG/DL (ref 0.3–1.2)
BILIRUBIN DIRECT: < 0.2 MG/DL (ref 0–0.3)
BUN, WHOLE BLOOD: 13 MG/DL (ref 8–26)
CHLORIDE, WHOLE BLOOD: 101 MEQ/L (ref 98–109)
CREATININE, WHOLE BLOOD: 0.9 MG/DL (ref 0.5–1.2)
EOSINOPHILS RELATIVE PERCENT: 6 % (ref 0–4)
GFR, ESTIMATED ,CON: 85 ML/MIN/1.73M2
GLUCOSE, WHOLE BLOOD: 177 MG/DL (ref 70–108)
HCT VFR BLD CALC: 38.7 % (ref 42–52)
HEMOGLOBIN: 13.5 GM/DL (ref 14–18)
IONIZED CALCIUM, WHOLE BLOOD: 1.1 MMOL/L (ref 1.12–1.32)
LYMPHOCYTES # BLD: 25 % (ref 15–47)
MCH RBC QN AUTO: 30 PG (ref 27–31)
MCHC RBC AUTO-ENTMCNC: 34.9 GM/DL (ref 33–37)
MCV RBC AUTO: 86 FL (ref 80–94)
MONOCYTES: 7 % (ref 0–12)
PDW BLD-RTO: 14.2 % (ref 11.5–14.5)
PLATELET # BLD: 182 THOU/MM3 (ref 130–400)
PMV BLD AUTO: 8.8 FL (ref 7.4–10.4)
POTASSIUM, WHOLE BLOOD: 4.2 MEQ/L (ref 3.5–4.9)
RBC # BLD: 4.5 MILL/MM3 (ref 4.7–6.1)
SEG NEUTROPHILS: 61 % (ref 43–75)
SODIUM, WHOLE BLOOD: 138 MEQ/L (ref 138–146)
TOTAL CO2, WHOLE BLOOD: 27 MEQ/L (ref 23–33)
TOTAL PROTEIN: 6.5 G/DL (ref 6.1–8)
WBC # BLD: 8.2 THOU/MM3 (ref 4.8–10.8)

## 2018-08-22 PROCEDURE — G0463 HOSPITAL OUTPT CLINIC VISIT: HCPCS

## 2018-08-22 PROCEDURE — G8427 DOCREV CUR MEDS BY ELIG CLIN: HCPCS | Performed by: INTERNAL MEDICINE

## 2018-08-22 PROCEDURE — 1036F TOBACCO NON-USER: CPT | Performed by: INTERNAL MEDICINE

## 2018-08-22 PROCEDURE — 99214 OFFICE O/P EST MOD 30 MIN: CPT | Performed by: INTERNAL MEDICINE

## 2018-08-22 PROCEDURE — 36415 COLL VENOUS BLD VENIPUNCTURE: CPT

## 2018-08-22 PROCEDURE — 4040F PNEUMOC VAC/ADMIN/RCVD: CPT | Performed by: INTERNAL MEDICINE

## 2018-08-22 PROCEDURE — 80076 HEPATIC FUNCTION PANEL: CPT

## 2018-08-22 PROCEDURE — G8417 CALC BMI ABV UP PARAM F/U: HCPCS | Performed by: INTERNAL MEDICINE

## 2018-08-22 PROCEDURE — G8598 ASA/ANTIPLAT THER USED: HCPCS | Performed by: INTERNAL MEDICINE

## 2018-08-22 PROCEDURE — 85025 COMPLETE CBC W/AUTO DIFF WBC: CPT

## 2018-08-22 PROCEDURE — 1101F PT FALLS ASSESS-DOCD LE1/YR: CPT | Performed by: INTERNAL MEDICINE

## 2018-08-22 PROCEDURE — 6360000002 HC RX W HCPCS: Performed by: INTERNAL MEDICINE

## 2018-08-22 PROCEDURE — 1123F ACP DISCUSS/DSCN MKR DOCD: CPT | Performed by: INTERNAL MEDICINE

## 2018-08-22 PROCEDURE — 80047 BASIC METABLC PNL IONIZED CA: CPT

## 2018-08-22 PROCEDURE — 96372 THER/PROPH/DIAG INJ SC/IM: CPT

## 2018-08-22 RX ADMIN — OCTREOTIDE ACETATE 30 MG: KIT at 10:04

## 2018-08-22 NOTE — PROGRESS NOTES
(36.5 °C)   SpO2: 98%   Vitals reviewed and are stable. Constitutional: Elderly, frail. No acute distress. HENT: Normocephalic and atraumatic. Eyes: Pupils are equal and reactive. No scleral icterus. Neck: Overall appearance is symmetrical. No identifiable masses. Chest: Inspection and palpation of chest is normal.  Pulmonary: Effort normal. No respiratory distress. Cardiovascular: RRR. No murmur. Abdominal: Soft. No hepatomegaly or splenomegaly. Musculoskeletal: Gait is abnormal. Muscle strength and tone decreased. Neurological: Alert and oriented to person, place, and time. Judgment and thought content normal.  Skin: Skin is warm and dry. No rash. Psychiatric: Mood and affect appropriate for the clinical situation. Behavior is normal.       Data Analysis:    Hematology 8/22/2018 7/25/2018 6/27/2018   WBC 8.2 7.4 8.1   RBC 4.50 (L) 4.49 (L) 4.67 (L)   HGB 13.5 (L) 12.8 (L) 14.0   HCT 38.7 (L) 39.2 (L) 40.8 (L)   MCV 86 87 87   RDW 14.2 13.5 13.5    163 169     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 2. Renal cell carcinoma. 3. Therapy with Octreotide. 4. Mild anemia. Plan:   1. Continue therapy with Octreotide monthly. 2.  Monitor for progression of malignancy. 3.  Monitor for side effects and toxicity from Octreotide. 4.  Monitor hemoglobin/hematocrit and for any signs of blood loss.     Laura Poe [place] [address] [address], [address] [place] [address], [address] [place] [address], [address] [place] [address]

## 2018-08-22 NOTE — PLAN OF CARE
Problem: Musculor/Skeletal Functional Status  Intervention: Fall precautions  Discussed fall precautions, call light within reach. Goal: Absence of falls  Outcome: Met This Shift  Patient verbalizes understanding of fall precautions. Patient free from falls this visit. Problem: Intellectual/Education/Knowledge Deficit  Intervention: Verbal/written education provided  Discussed sandostatin action, possible side effects and when to call the doctor. Goal: Teaching initiated upon admission  Outcome: Met This Shift  Patient verbalizes understanding to verbal information given on sandostatin,action and possible side effects. Aware to call MD if develop complications. Problem: Discharge Planning  Intervention: Interaction with patient/family and care team  Discuss discharge instructions, follow ups, and when to call the doctor. Goal: Knowledge of discharge instructions  Knowledge of discharge instructions    Outcome: Met This Shift  Verbalized understanding of discharge instructions, follow-up appointments, and when to call the physician. Comments: Care plan reviewed with patient. Patient verbalize understanding of the plan of care and contribute to goal setting.

## 2018-08-22 NOTE — PATIENT INSTRUCTIONS
1.  Octreotide today. 2.  Octreotide and labs on 09/19/2018. 3.  CT scans prior to RTC. 4.  Labs on RTC. 5.  Ocreotide on RTC.

## 2018-09-18 RX ORDER — METHYLPREDNISOLONE SODIUM SUCCINATE 125 MG/2ML
125 INJECTION, POWDER, LYOPHILIZED, FOR SOLUTION INTRAMUSCULAR; INTRAVENOUS ONCE
Status: CANCELLED | OUTPATIENT
Start: 2018-09-19 | End: 2018-09-19

## 2018-09-18 RX ORDER — SODIUM CHLORIDE 9 MG/ML
INJECTION, SOLUTION INTRAVENOUS CONTINUOUS
Status: CANCELLED | OUTPATIENT
Start: 2018-09-19

## 2018-09-18 RX ORDER — 0.9 % SODIUM CHLORIDE 0.9 %
10 VIAL (ML) INJECTION ONCE
Status: CANCELLED | OUTPATIENT
Start: 2018-09-19 | End: 2018-09-19

## 2018-09-18 RX ORDER — DIPHENHYDRAMINE HYDROCHLORIDE 50 MG/ML
50 INJECTION INTRAMUSCULAR; INTRAVENOUS ONCE
Status: CANCELLED | OUTPATIENT
Start: 2018-09-19 | End: 2018-09-19

## 2018-09-18 RX ORDER — HEPARIN SODIUM (PORCINE) LOCK FLUSH IV SOLN 100 UNIT/ML 100 UNIT/ML
500 SOLUTION INTRAVENOUS PRN
Status: CANCELLED | OUTPATIENT
Start: 2018-09-19

## 2018-09-18 RX ORDER — SODIUM CHLORIDE 0.9 % (FLUSH) 0.9 %
10 SYRINGE (ML) INJECTION PRN
Status: CANCELLED | OUTPATIENT
Start: 2018-09-19

## 2018-09-18 RX ORDER — EPINEPHRINE 1 MG/ML
0.3 INJECTION, SOLUTION, CONCENTRATE INTRAVENOUS PRN
Status: CANCELLED | OUTPATIENT
Start: 2018-09-19

## 2018-09-19 ENCOUNTER — HOSPITAL ENCOUNTER (OUTPATIENT)
Dept: INFUSION THERAPY | Age: 83
Discharge: HOME | End: 2018-09-19
Payer: MEDICARE

## 2018-09-19 VITALS
SYSTOLIC BLOOD PRESSURE: 117 MMHG | HEIGHT: 68 IN | DIASTOLIC BLOOD PRESSURE: 73 MMHG | OXYGEN SATURATION: 99 % | RESPIRATION RATE: 18 BRPM | TEMPERATURE: 97.9 F | WEIGHT: 176.8 LBS | BODY MASS INDEX: 26.8 KG/M2 | HEART RATE: 66 BPM

## 2018-09-19 DIAGNOSIS — Z79.899 ENCOUNTER FOR MONITORING OCTREOTIDE THERAPY: ICD-10-CM

## 2018-09-19 DIAGNOSIS — D3A.00 CARCINOID TUMOR: ICD-10-CM

## 2018-09-19 DIAGNOSIS — Z51.81 ENCOUNTER FOR MONITORING OCTREOTIDE THERAPY: ICD-10-CM

## 2018-09-19 LAB
ALBUMIN SERPL-MCNC: 3.9 G/DL (ref 3.5–5.1)
ALP BLD-CCNC: 253 U/L (ref 38–126)
ALT SERPL-CCNC: 29 U/L (ref 11–66)
AST SERPL-CCNC: 22 U/L (ref 5–40)
BASINOPHIL, AUTOMATED: 0 % (ref 0–3)
BILIRUB SERPL-MCNC: 0.5 MG/DL (ref 0.3–1.2)
BILIRUBIN DIRECT: < 0.2 MG/DL (ref 0–0.3)
BUN, WHOLE BLOOD: 19 MG/DL (ref 8–26)
CHLORIDE, WHOLE BLOOD: 99 MEQ/L (ref 98–109)
CREATININE, WHOLE BLOOD: 1 MG/DL (ref 0.5–1.2)
EOSINOPHILS RELATIVE PERCENT: 6 % (ref 0–4)
GFR, ESTIMATED ,CON: 75 ML/MIN/1.73M2
GLUCOSE, WHOLE BLOOD: 113 MG/DL (ref 70–108)
HCT VFR BLD CALC: 40.7 % (ref 42–52)
HEMOGLOBIN: 13.8 GM/DL (ref 14–18)
IONIZED CALCIUM, WHOLE BLOOD: 1.17 MMOL/L (ref 1.12–1.32)
LYMPHOCYTES # BLD: 30 % (ref 15–47)
MCH RBC QN AUTO: 30.2 PG (ref 27–31)
MCHC RBC AUTO-ENTMCNC: 33.8 GM/DL (ref 33–37)
MCV RBC AUTO: 89 FL (ref 80–94)
MONOCYTES: 8 % (ref 0–12)
PDW BLD-RTO: 13.6 % (ref 11.5–14.5)
PLATELET # BLD: 162 THOU/MM3 (ref 130–400)
PMV BLD AUTO: 9.1 FL (ref 7.4–10.4)
POTASSIUM, WHOLE BLOOD: 4.2 MEQ/L (ref 3.5–4.9)
RBC # BLD: 4.55 MILL/MM3 (ref 4.7–6.1)
SEG NEUTROPHILS: 55 % (ref 43–75)
SODIUM, WHOLE BLOOD: 139 MEQ/L (ref 138–146)
TOTAL CO2, WHOLE BLOOD: 27 MEQ/L (ref 23–33)
TOTAL PROTEIN: 6.8 G/DL (ref 6.1–8)
WBC # BLD: 8.1 THOU/MM3 (ref 4.8–10.8)

## 2018-09-19 PROCEDURE — 6360000002 HC RX W HCPCS: Performed by: INTERNAL MEDICINE

## 2018-09-19 PROCEDURE — 85025 COMPLETE CBC W/AUTO DIFF WBC: CPT

## 2018-09-19 PROCEDURE — 86316 IMMUNOASSAY TUMOR OTHER: CPT

## 2018-09-19 PROCEDURE — 80076 HEPATIC FUNCTION PANEL: CPT

## 2018-09-19 PROCEDURE — 80047 BASIC METABLC PNL IONIZED CA: CPT

## 2018-09-19 PROCEDURE — 36415 COLL VENOUS BLD VENIPUNCTURE: CPT

## 2018-09-19 PROCEDURE — 96372 THER/PROPH/DIAG INJ SC/IM: CPT

## 2018-09-19 RX ADMIN — OCTREOTIDE ACETATE 30 MG: KIT at 10:14

## 2018-09-19 NOTE — PLAN OF CARE
Problem: Intellectual/Education/Knowledge Deficit  Intervention: Verbal/written education provided  Discuss sandostatin,action and possible side effects. Aware to call MD if develop complications. Goal: Teaching initiated upon admission  Outcome: Met This Shift  Patient verbalizes understanding to verbal information given on sandostatin,action and possible side effects. Aware to call MD if develop complications. Problem: Discharge Planning  Intervention: Discharge to appropriate level of care  Discuss discharge instructions, follow ups and when to call doctor. Goal: Knowledge of discharge instructions  Knowledge of discharge instructions    Outcome: Met This Shift  Verbalized understanding of discharge instructions, follow ups and when to call doctor    Comments: Care plan reviewed with patient. Patient verbalized understanding of the plan of care and contribute to goal setting.

## 2018-09-19 NOTE — PROGRESS NOTES
Sandostatin given as charted. Tolerated well. Discharged in satisfactory condition.  Ambulated off unit per self

## 2018-09-21 LAB — CHROMOGRANIN A: 2333 NG/ML (ref 0–95)

## 2018-10-11 DIAGNOSIS — Z79.899 ENCOUNTER FOR MONITORING OCTREOTIDE THERAPY: ICD-10-CM

## 2018-10-11 DIAGNOSIS — D3A.00 CARCINOID TUMOR: ICD-10-CM

## 2018-10-11 DIAGNOSIS — Z51.81 ENCOUNTER FOR MONITORING OCTREOTIDE THERAPY: ICD-10-CM

## 2018-10-17 ENCOUNTER — HOSPITAL ENCOUNTER (OUTPATIENT)
Dept: INFUSION THERAPY | Age: 83
Discharge: HOME | End: 2018-10-17
Payer: MEDICARE

## 2018-10-17 ENCOUNTER — OFFICE VISIT (OUTPATIENT)
Dept: ONCOLOGY | Age: 83
End: 2018-10-17
Payer: MEDICARE

## 2018-10-17 VITALS
BODY MASS INDEX: 26.37 KG/M2 | OXYGEN SATURATION: 98 % | HEIGHT: 68 IN | RESPIRATION RATE: 18 BRPM | DIASTOLIC BLOOD PRESSURE: 73 MMHG | HEART RATE: 74 BPM | SYSTOLIC BLOOD PRESSURE: 136 MMHG | TEMPERATURE: 98 F | WEIGHT: 174 LBS

## 2018-10-17 VITALS
OXYGEN SATURATION: 99 % | TEMPERATURE: 97.5 F | SYSTOLIC BLOOD PRESSURE: 124 MMHG | HEART RATE: 78 BPM | BODY MASS INDEX: 26.37 KG/M2 | WEIGHT: 174 LBS | RESPIRATION RATE: 16 BRPM | HEIGHT: 68 IN | DIASTOLIC BLOOD PRESSURE: 66 MMHG

## 2018-10-17 DIAGNOSIS — Z51.81 ENCOUNTER FOR MONITORING OCTREOTIDE THERAPY: ICD-10-CM

## 2018-10-17 DIAGNOSIS — C64.9 MALIGNANT NEOPLASM OF KIDNEY, UNSPECIFIED LATERALITY (HCC): Primary | ICD-10-CM

## 2018-10-17 DIAGNOSIS — D64.9 CHRONIC ANEMIA: ICD-10-CM

## 2018-10-17 DIAGNOSIS — Z79.899 ENCOUNTER FOR MONITORING OCTREOTIDE THERAPY: ICD-10-CM

## 2018-10-17 DIAGNOSIS — D3A.00 CARCINOID TUMOR: ICD-10-CM

## 2018-10-17 DIAGNOSIS — G89.3 CHRONIC PAIN DUE TO NEOPLASM: ICD-10-CM

## 2018-10-17 LAB
ALBUMIN SERPL-MCNC: 3.4 G/DL (ref 3.5–5.1)
ALP BLD-CCNC: 238 U/L (ref 38–126)
ALT SERPL-CCNC: 22 U/L (ref 11–66)
AST SERPL-CCNC: 20 U/L (ref 5–40)
BASINOPHIL, AUTOMATED: 1 % (ref 0–3)
BILIRUB SERPL-MCNC: 0.3 MG/DL (ref 0.3–1.2)
BILIRUBIN DIRECT: < 0.2 MG/DL (ref 0–0.3)
BUN, WHOLE BLOOD: 13 MG/DL (ref 8–26)
CHLORIDE, WHOLE BLOOD: 100 MEQ/L (ref 98–109)
CREATININE, WHOLE BLOOD: 1 MG/DL (ref 0.5–1.2)
EOSINOPHILS RELATIVE PERCENT: 5 % (ref 0–4)
GFR, ESTIMATED ,CON: 75 ML/MIN/1.73M2
GLUCOSE, WHOLE BLOOD: 171 MG/DL (ref 70–108)
HCT VFR BLD CALC: 34.5 % (ref 42–52)
HEMOGLOBIN: 11.3 GM/DL (ref 14–18)
IONIZED CALCIUM, WHOLE BLOOD: 1.13 MMOL/L (ref 1.12–1.32)
LYMPHOCYTES # BLD: 22 % (ref 15–47)
MCH RBC QN AUTO: 28.9 PG (ref 27–31)
MCHC RBC AUTO-ENTMCNC: 32.9 GM/DL (ref 33–37)
MCV RBC AUTO: 88 FL (ref 80–94)
MONOCYTES: 6 % (ref 0–12)
PDW BLD-RTO: 12.1 % (ref 11.5–14.5)
PLATELET # BLD: 324 THOU/MM3 (ref 130–400)
PMV BLD AUTO: 7.4 FL (ref 7.4–10.4)
POTASSIUM, WHOLE BLOOD: 4 MEQ/L (ref 3.5–4.9)
RBC # BLD: 3.92 MILL/MM3 (ref 4.7–6.1)
SEG NEUTROPHILS: 66 % (ref 43–75)
SODIUM, WHOLE BLOOD: 139 MEQ/L (ref 138–146)
TOTAL CO2, WHOLE BLOOD: 26 MEQ/L (ref 23–33)
TOTAL PROTEIN: 6.8 G/DL (ref 6.1–8)
WBC # BLD: 10.3 THOU/MM3 (ref 4.8–10.8)

## 2018-10-17 PROCEDURE — G8417 CALC BMI ABV UP PARAM F/U: HCPCS | Performed by: INTERNAL MEDICINE

## 2018-10-17 PROCEDURE — G8427 DOCREV CUR MEDS BY ELIG CLIN: HCPCS | Performed by: INTERNAL MEDICINE

## 2018-10-17 PROCEDURE — 96372 THER/PROPH/DIAG INJ SC/IM: CPT

## 2018-10-17 PROCEDURE — 6360000002 HC RX W HCPCS: Performed by: INTERNAL MEDICINE

## 2018-10-17 PROCEDURE — 1123F ACP DISCUSS/DSCN MKR DOCD: CPT | Performed by: INTERNAL MEDICINE

## 2018-10-17 PROCEDURE — 1036F TOBACCO NON-USER: CPT | Performed by: INTERNAL MEDICINE

## 2018-10-17 PROCEDURE — 36415 COLL VENOUS BLD VENIPUNCTURE: CPT

## 2018-10-17 PROCEDURE — G8598 ASA/ANTIPLAT THER USED: HCPCS | Performed by: INTERNAL MEDICINE

## 2018-10-17 PROCEDURE — 85025 COMPLETE CBC W/AUTO DIFF WBC: CPT

## 2018-10-17 PROCEDURE — 80047 BASIC METABLC PNL IONIZED CA: CPT

## 2018-10-17 PROCEDURE — 80076 HEPATIC FUNCTION PANEL: CPT

## 2018-10-17 PROCEDURE — G0463 HOSPITAL OUTPT CLINIC VISIT: HCPCS

## 2018-10-17 PROCEDURE — 99215 OFFICE O/P EST HI 40 MIN: CPT | Performed by: INTERNAL MEDICINE

## 2018-10-17 PROCEDURE — G8484 FLU IMMUNIZE NO ADMIN: HCPCS | Performed by: INTERNAL MEDICINE

## 2018-10-17 PROCEDURE — 4040F PNEUMOC VAC/ADMIN/RCVD: CPT | Performed by: INTERNAL MEDICINE

## 2018-10-17 PROCEDURE — 1101F PT FALLS ASSESS-DOCD LE1/YR: CPT | Performed by: INTERNAL MEDICINE

## 2018-10-17 RX ORDER — LEVOFLOXACIN 500 MG/1
500 TABLET, FILM COATED ORAL
COMMUNITY
Start: 2018-10-15 | End: 2018-11-12

## 2018-10-17 RX ORDER — SODIUM CHLORIDE 0.9 % (FLUSH) 0.9 %
10 SYRINGE (ML) INJECTION PRN
Status: CANCELLED | OUTPATIENT
Start: 2018-10-17

## 2018-10-17 RX ORDER — HEPARIN SODIUM (PORCINE) LOCK FLUSH IV SOLN 100 UNIT/ML 100 UNIT/ML
500 SOLUTION INTRAVENOUS PRN
Status: CANCELLED | OUTPATIENT
Start: 2018-10-17

## 2018-10-17 RX ORDER — 0.9 % SODIUM CHLORIDE 0.9 %
10 VIAL (ML) INJECTION ONCE
Status: CANCELLED | OUTPATIENT
Start: 2018-10-17 | End: 2018-10-17

## 2018-10-17 RX ORDER — METHYLPREDNISOLONE SODIUM SUCCINATE 125 MG/2ML
125 INJECTION, POWDER, LYOPHILIZED, FOR SOLUTION INTRAMUSCULAR; INTRAVENOUS ONCE
Status: CANCELLED | OUTPATIENT
Start: 2018-10-17 | End: 2018-10-17

## 2018-10-17 RX ORDER — SODIUM CHLORIDE 9 MG/ML
INJECTION, SOLUTION INTRAVENOUS CONTINUOUS
Status: CANCELLED | OUTPATIENT
Start: 2018-10-17

## 2018-10-17 RX ORDER — DIPHENHYDRAMINE HYDROCHLORIDE 50 MG/ML
50 INJECTION INTRAMUSCULAR; INTRAVENOUS ONCE
Status: CANCELLED | OUTPATIENT
Start: 2018-10-17 | End: 2018-10-17

## 2018-10-17 RX ORDER — EPINEPHRINE 1 MG/ML
0.3 INJECTION, SOLUTION, CONCENTRATE INTRAVENOUS PRN
Status: CANCELLED | OUTPATIENT
Start: 2018-10-17

## 2018-10-17 RX ORDER — METOPROLOL SUCCINATE 25 MG/1
50 TABLET, EXTENDED RELEASE ORAL DAILY
COMMUNITY

## 2018-10-17 RX ADMIN — OCTREOTIDE ACETATE 30 MG: KIT at 09:22

## 2018-10-17 NOTE — PROGRESS NOTES
Sandostatin given as charted, tolerated well. Discharged in satisfactory condition.  Ambulated off unit per self

## 2018-10-20 NOTE — PROGRESS NOTES
[facility]  CANCER NETWORK OF [facility]  [facility] [address] [facility]  [address] [address]  Dept: [phone]  Dept Fax: [phone]  Loc: [phone]    Subjective:      Chief Complaint: Pritesh Yang is a 80 y.o. male. The patient has been treated for malignant carcinoid syndrome. He was diagnosed in December of 2012 when he presented with abdominal pain. An abdominal CAT scan demonstrated intra-abdominal adenopathy and disease in the liver. He underwent a liver biopsy on November 11, 2012 which confirmed carcinoid tumor. An octreotide scan confirmed disease in the liver and abdominal lymph nodes. At the same time there was a mass noted in the left kidney that was concerning for a second primary malignancy. A CT directed biopsy was canceled by the radiologist and before an open biopsy could be scheduled, and the patient experienced a myocardial infarction. He had three stents placed and takes anti-platelet medication routinely. He subsequently developed problems with urinary retention and was found to have prostatic hypertrophy. He had a PSA of 7. He was advised that he was a candidate for a TURP but could not have this procedure done because of the myocardial infarction. A follow up abdominal CAT scan was completed on October 9, 2013. This demonstrated an increase in the size of lesions in the liver, abdominal lymph nodes and kidney. 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 Bharath Fay developed an increase of right upper quadrant and mid abdominal pain in March, 2014. He was also having increasing episodes of diarrhea.  He was referred for an abdominal CAT scan completed on 3/11/2014 that demonstrated enlargement of all areas of hepatic metastasis compared to a CAT scan obtained on 10/9/2013. There was noted to be stable BPH. There was an area of \"inflammatory reaction\" extending off the tail of the pancreas along the inferior aspect of the spleen. The tail of the pancreas revealed minimal edema. The possibility of pancreatitis was suggested. There was noted to be change in the kidney interpreted as pyelonephritis. However the radiologist did not make mention of the recent partial nephrectomy which could also be the explanation for the other described findings. He was started on Octreotide on 03/12/2014. In 2018, the patient underwent two TACE sutures at [facility]. HPI:  The patient is here today for follow up evaluation. Sterlign Tabares is here today for follow-up regarding his history of carcinoid malignancy. He continued to receive therapy with octreotide. She tolerates this well without significant complications or severe toxicity. On October 10, 2018 and follow up CT scans completed. These scans found that the liver lesions appeared stable regarding his malignancy. He was noted to have some mild progression of his carcinomatosis. The patient does not have abdominal pain or abdominal distention. He complains of some mild fatigue but reports that this is stable. The patient has recently underwent further cardiovascular evaluation for his cardiac disease. He does not report having severe diarrhea or flushing. Both his bowel and bladder habits have been stable. ECOG performance status remains level 0. PMH, SH, and FH:  I reviewed the PMH, SH and FH as noted on the electronic medical record. There have been no changes as noted in the previous documentation. Review of Systems   Constitutional: Fatigue. HENT: Negative. Eyes: Negative. Respiratory: Negative. Cardiovascular: Negative. Gastrointestinal: Negative. Genitourinary: Negative. Musculoskeletal: Negative. Skin: Negative. Neurological: Negative. Hematological: Negative.

## 2018-11-12 RX ORDER — DIPHENHYDRAMINE HYDROCHLORIDE 50 MG/ML
50 INJECTION INTRAMUSCULAR; INTRAVENOUS ONCE
Status: CANCELLED | OUTPATIENT
Start: 2018-11-14 | End: 2018-11-14

## 2018-11-12 RX ORDER — METHYLPREDNISOLONE SODIUM SUCCINATE 125 MG/2ML
125 INJECTION, POWDER, LYOPHILIZED, FOR SOLUTION INTRAMUSCULAR; INTRAVENOUS ONCE
Status: CANCELLED | OUTPATIENT
Start: 2018-11-14 | End: 2018-11-14

## 2018-11-12 RX ORDER — SODIUM CHLORIDE 0.9 % (FLUSH) 0.9 %
10 SYRINGE (ML) INJECTION PRN
Status: CANCELLED | OUTPATIENT
Start: 2018-11-14

## 2018-11-12 RX ORDER — HEPARIN SODIUM (PORCINE) LOCK FLUSH IV SOLN 100 UNIT/ML 100 UNIT/ML
500 SOLUTION INTRAVENOUS PRN
Status: CANCELLED | OUTPATIENT
Start: 2018-11-14

## 2018-11-12 RX ORDER — SODIUM CHLORIDE 9 MG/ML
INJECTION, SOLUTION INTRAVENOUS CONTINUOUS
Status: CANCELLED | OUTPATIENT
Start: 2018-11-14

## 2018-11-12 RX ORDER — 0.9 % SODIUM CHLORIDE 0.9 %
10 VIAL (ML) INJECTION ONCE
Status: CANCELLED | OUTPATIENT
Start: 2018-11-14 | End: 2018-11-14

## 2018-11-12 RX ORDER — EPINEPHRINE 1 MG/ML
0.3 INJECTION, SOLUTION, CONCENTRATE INTRAVENOUS PRN
Status: CANCELLED | OUTPATIENT
Start: 2018-11-14

## 2018-11-13 ENCOUNTER — TELEPHONE (OUTPATIENT)
Dept: UROLOGY | Age: 83
End: 2018-11-13

## 2018-11-14 ENCOUNTER — HOSPITAL ENCOUNTER (OUTPATIENT)
Dept: INFUSION THERAPY | Age: 83
Discharge: HOME | End: 2018-11-14
Payer: MEDICARE

## 2018-11-14 VITALS
TEMPERATURE: 97.8 F | HEART RATE: 67 BPM | OXYGEN SATURATION: 99 % | DIASTOLIC BLOOD PRESSURE: 65 MMHG | SYSTOLIC BLOOD PRESSURE: 111 MMHG | BODY MASS INDEX: 25.94 KG/M2 | HEIGHT: 68 IN | WEIGHT: 171.2 LBS | RESPIRATION RATE: 18 BRPM

## 2018-11-14 DIAGNOSIS — G89.3 CHRONIC PAIN DUE TO NEOPLASM: ICD-10-CM

## 2018-11-14 DIAGNOSIS — C64.9 MALIGNANT NEOPLASM OF KIDNEY, UNSPECIFIED LATERALITY (HCC): ICD-10-CM

## 2018-11-14 DIAGNOSIS — D64.9 CHRONIC ANEMIA: ICD-10-CM

## 2018-11-14 DIAGNOSIS — D3A.00 CARCINOID TUMOR: ICD-10-CM

## 2018-11-14 LAB
ALBUMIN SERPL-MCNC: 3.9 G/DL (ref 3.5–5.1)
ALP BLD-CCNC: 187 U/L (ref 38–126)
ALT SERPL-CCNC: 18 U/L (ref 11–66)
AST SERPL-CCNC: 23 U/L (ref 5–40)
BASINOPHIL, AUTOMATED: 1 % (ref 0–3)
BILIRUB SERPL-MCNC: 0.4 MG/DL (ref 0.3–1.2)
BILIRUBIN DIRECT: < 0.2 MG/DL (ref 0–0.3)
BUN, WHOLE BLOOD: 17 MG/DL (ref 8–26)
CHLORIDE, WHOLE BLOOD: 99 MEQ/L (ref 98–109)
CREATININE, WHOLE BLOOD: 1 MG/DL (ref 0.5–1.2)
EOSINOPHILS RELATIVE PERCENT: 5 % (ref 0–4)
GFR, ESTIMATED ,CON: 75 ML/MIN/1.73M2
GLUCOSE, WHOLE BLOOD: 106 MG/DL (ref 70–108)
HCT VFR BLD CALC: 39.5 % (ref 42–52)
HEMOGLOBIN: 13.1 GM/DL (ref 14–18)
IONIZED CALCIUM, WHOLE BLOOD: 1.15 MMOL/L (ref 1.12–1.32)
LYMPHOCYTES # BLD: 28 % (ref 15–47)
MCH RBC QN AUTO: 28.9 PG (ref 27–31)
MCHC RBC AUTO-ENTMCNC: 33.1 GM/DL (ref 33–37)
MCV RBC AUTO: 87 FL (ref 80–94)
MONOCYTES: 7 % (ref 0–12)
PDW BLD-RTO: 12.5 % (ref 11.5–14.5)
PLATELET # BLD: 182 THOU/MM3 (ref 130–400)
PMV BLD AUTO: 9.1 FL (ref 7.4–10.4)
POTASSIUM, WHOLE BLOOD: 4.1 MEQ/L (ref 3.5–4.9)
RBC # BLD: 4.52 MILL/MM3 (ref 4.7–6.1)
SEG NEUTROPHILS: 59 % (ref 43–75)
SODIUM, WHOLE BLOOD: 140 MEQ/L (ref 138–146)
TOTAL CO2, WHOLE BLOOD: 28 MEQ/L (ref 23–33)
TOTAL PROTEIN: 6.8 G/DL (ref 6.1–8)
WBC # BLD: 7.7 THOU/MM3 (ref 4.8–10.8)

## 2018-11-14 PROCEDURE — 6360000002 HC RX W HCPCS: Performed by: INTERNAL MEDICINE

## 2018-11-14 PROCEDURE — 80047 BASIC METABLC PNL IONIZED CA: CPT

## 2018-11-14 PROCEDURE — 85025 COMPLETE CBC W/AUTO DIFF WBC: CPT

## 2018-11-14 PROCEDURE — 36415 COLL VENOUS BLD VENIPUNCTURE: CPT

## 2018-11-14 PROCEDURE — 80076 HEPATIC FUNCTION PANEL: CPT

## 2018-11-14 PROCEDURE — 96372 THER/PROPH/DIAG INJ SC/IM: CPT

## 2018-11-14 RX ADMIN — OCTREOTIDE ACETATE 30 MG: KIT at 10:29

## 2018-11-15 NOTE — PLAN OF CARE
Problem: Intellectual/Education/Knowledge Deficit  Intervention: Verbal/written education provided  Discuss sandostatin    Goal: Teaching initiated upon admission  Outcome: Met This Shift  Patient verbalizes understanding to verbal information given on sandostatin action and possible side effects. Aware to call MD if develop complications. Problem: Discharge Planning  Intervention: Discharge to appropriate level of care  Discuss discharge instructions, follow ups and when to call doctor. Goal: Knowledge of discharge instructions  Knowledge of discharge instructions    Outcome: Met This Shift  Verbalized understanding of discharge instructions, follow ups and when to call doctor    Comments: Care plan reviewed with patient. Patient verbalized understanding of the plan of care and contribute to goal setting.

## 2018-11-18 LAB
BASOPHILS ABSOLUTE: ABNORMAL /ΜL
BASOPHILS RELATIVE PERCENT: ABNORMAL %
EOSINOPHILS ABSOLUTE: ABNORMAL /ΜL
EOSINOPHILS RELATIVE PERCENT: ABNORMAL %
HCT VFR BLD CALC: 38.8 % (ref 41–53)
HEMOGLOBIN: 12.7 G/DL (ref 13.5–17.5)
LYMPHOCYTES ABSOLUTE: ABNORMAL /ΜL
LYMPHOCYTES RELATIVE PERCENT: ABNORMAL %
MCH RBC QN AUTO: 29.1 PG
MCHC RBC AUTO-ENTMCNC: 32.6 G/DL
MCV RBC AUTO: 89.3 FL
MONOCYTES ABSOLUTE: ABNORMAL /ΜL
MONOCYTES RELATIVE PERCENT: ABNORMAL %
NEUTROPHILS ABSOLUTE: ABNORMAL /ΜL
NEUTROPHILS RELATIVE PERCENT: ABNORMAL %
PLATELET # BLD: 160 K/ΜL
PMV BLD AUTO: ABNORMAL FL
RBC # BLD: 4.35 10^6/ΜL
WBC # BLD: 8.5 10^3/ML

## 2018-11-19 LAB
BUN BLDV-MCNC: 11 MG/DL
CALCIUM SERPL-MCNC: 8.5 MG/DL
CHLORIDE BLD-SCNC: 103 MMOL/L
CO2: 26 MMOL/L
CREAT SERPL-MCNC: 0.73 MG/DL
GFR CALCULATED: NORMAL
GLUCOSE BLD-MCNC: 137 MG/DL
POTASSIUM SERPL-SCNC: 3.8 MMOL/L
SODIUM BLD-SCNC: 138 MMOL/L

## 2018-11-21 DIAGNOSIS — D3A.00 CARCINOID TUMOR: Primary | ICD-10-CM

## 2018-11-21 RX ORDER — OXYCODONE HYDROCHLORIDE AND ACETAMINOPHEN 5; 325 MG/1; MG/1
1 TABLET ORAL EVERY 6 HOURS PRN
Qty: 60 TABLET | Refills: 0 | Status: SHIPPED | OUTPATIENT
Start: 2018-11-21 | End: 2018-12-21

## 2018-12-11 RX ORDER — SODIUM CHLORIDE 9 MG/ML
INJECTION, SOLUTION INTRAVENOUS CONTINUOUS
Status: CANCELLED | OUTPATIENT
Start: 2018-12-12

## 2018-12-11 RX ORDER — SODIUM CHLORIDE 0.9 % (FLUSH) 0.9 %
10 SYRINGE (ML) INJECTION PRN
Status: CANCELLED | OUTPATIENT
Start: 2018-12-12

## 2018-12-11 RX ORDER — DIPHENHYDRAMINE HYDROCHLORIDE 50 MG/ML
50 INJECTION INTRAMUSCULAR; INTRAVENOUS ONCE
Status: CANCELLED | OUTPATIENT
Start: 2018-12-12 | End: 2018-12-12

## 2018-12-11 RX ORDER — METHYLPREDNISOLONE SODIUM SUCCINATE 125 MG/2ML
125 INJECTION, POWDER, LYOPHILIZED, FOR SOLUTION INTRAMUSCULAR; INTRAVENOUS ONCE
Status: CANCELLED | OUTPATIENT
Start: 2018-12-12 | End: 2018-12-12

## 2018-12-11 RX ORDER — EPINEPHRINE 1 MG/ML
0.3 INJECTION, SOLUTION, CONCENTRATE INTRAVENOUS PRN
Status: CANCELLED | OUTPATIENT
Start: 2018-12-12

## 2018-12-11 RX ORDER — 0.9 % SODIUM CHLORIDE 0.9 %
10 VIAL (ML) INJECTION ONCE
Status: CANCELLED | OUTPATIENT
Start: 2018-12-12 | End: 2018-12-12

## 2018-12-11 RX ORDER — HEPARIN SODIUM (PORCINE) LOCK FLUSH IV SOLN 100 UNIT/ML 100 UNIT/ML
500 SOLUTION INTRAVENOUS PRN
Status: CANCELLED | OUTPATIENT
Start: 2018-12-12

## 2018-12-12 ENCOUNTER — HOSPITAL ENCOUNTER (OUTPATIENT)
Dept: INFUSION THERAPY | Age: 83
Discharge: HOME | End: 2018-12-12
Payer: MEDICARE

## 2018-12-12 ENCOUNTER — OFFICE VISIT (OUTPATIENT)
Dept: ONCOLOGY | Age: 83
End: 2018-12-12
Payer: MEDICARE

## 2018-12-12 VITALS
RESPIRATION RATE: 18 BRPM | OXYGEN SATURATION: 100 % | WEIGHT: 172.4 LBS | HEIGHT: 68 IN | SYSTOLIC BLOOD PRESSURE: 139 MMHG | TEMPERATURE: 97.7 F | HEART RATE: 78 BPM | BODY MASS INDEX: 26.13 KG/M2 | DIASTOLIC BLOOD PRESSURE: 71 MMHG

## 2018-12-12 VITALS
SYSTOLIC BLOOD PRESSURE: 148 MMHG | HEIGHT: 68 IN | TEMPERATURE: 97.5 F | BODY MASS INDEX: 26.13 KG/M2 | DIASTOLIC BLOOD PRESSURE: 70 MMHG | RESPIRATION RATE: 18 BRPM | HEART RATE: 65 BPM | WEIGHT: 172.4 LBS | OXYGEN SATURATION: 100 %

## 2018-12-12 DIAGNOSIS — C64.9 MALIGNANT NEOPLASM OF KIDNEY, UNSPECIFIED LATERALITY (HCC): Primary | ICD-10-CM

## 2018-12-12 DIAGNOSIS — G89.3 CHRONIC PAIN DUE TO NEOPLASM: ICD-10-CM

## 2018-12-12 DIAGNOSIS — D3A.00 CARCINOID TUMOR: ICD-10-CM

## 2018-12-12 DIAGNOSIS — C64.9 MALIGNANT NEOPLASM OF KIDNEY, UNSPECIFIED LATERALITY (HCC): ICD-10-CM

## 2018-12-12 DIAGNOSIS — D64.9 CHRONIC ANEMIA: ICD-10-CM

## 2018-12-12 LAB
ALBUMIN SERPL-MCNC: 3.8 G/DL (ref 3.5–5.1)
ALP BLD-CCNC: 229 U/L (ref 38–126)
ALT SERPL-CCNC: 20 U/L (ref 11–66)
AST SERPL-CCNC: 17 U/L (ref 5–40)
BASINOPHIL, AUTOMATED: 0 % (ref 0–3)
BILIRUB SERPL-MCNC: 0.3 MG/DL (ref 0.3–1.2)
BILIRUBIN DIRECT: < 0.2 MG/DL (ref 0–0.3)
BUN, WHOLE BLOOD: 17 MG/DL (ref 8–26)
CHLORIDE, WHOLE BLOOD: 100 MEQ/L (ref 98–109)
CREATININE, WHOLE BLOOD: 1 MG/DL (ref 0.5–1.2)
EOSINOPHILS RELATIVE PERCENT: 4 % (ref 0–4)
GFR, ESTIMATED ,CON: 75 ML/MIN/1.73M2
GLUCOSE, WHOLE BLOOD: 154 MG/DL (ref 70–108)
HCT VFR BLD CALC: 38.8 % (ref 42–52)
HEMOGLOBIN: 13 GM/DL (ref 14–18)
IONIZED CALCIUM, WHOLE BLOOD: 1.15 MMOL/L (ref 1.12–1.32)
LYMPHOCYTES # BLD: 29 % (ref 15–47)
MCH RBC QN AUTO: 28.4 PG (ref 27–31)
MCHC RBC AUTO-ENTMCNC: 33.3 GM/DL (ref 33–37)
MCV RBC AUTO: 85 FL (ref 80–94)
MONOCYTES: 6 % (ref 0–12)
PDW BLD-RTO: 12.5 % (ref 11.5–14.5)
PLATELET # BLD: 202 THOU/MM3 (ref 130–400)
PMV BLD AUTO: 9.1 FL (ref 7.4–10.4)
POTASSIUM, WHOLE BLOOD: 4.1 MEQ/L (ref 3.5–4.9)
RBC # BLD: 4.56 MILL/MM3 (ref 4.7–6.1)
SEG NEUTROPHILS: 61 % (ref 43–75)
SODIUM, WHOLE BLOOD: 139 MEQ/L (ref 138–146)
TOTAL CO2, WHOLE BLOOD: 27 MEQ/L (ref 23–33)
TOTAL PROTEIN: 6.9 G/DL (ref 6.1–8)
WBC # BLD: 8.7 THOU/MM3 (ref 4.8–10.8)

## 2018-12-12 PROCEDURE — 96372 THER/PROPH/DIAG INJ SC/IM: CPT

## 2018-12-12 PROCEDURE — 1101F PT FALLS ASSESS-DOCD LE1/YR: CPT | Performed by: INTERNAL MEDICINE

## 2018-12-12 PROCEDURE — G8427 DOCREV CUR MEDS BY ELIG CLIN: HCPCS | Performed by: INTERNAL MEDICINE

## 2018-12-12 PROCEDURE — 96402 CHEMO HORMON ANTINEOPL SQ/IM: CPT

## 2018-12-12 PROCEDURE — 80076 HEPATIC FUNCTION PANEL: CPT

## 2018-12-12 PROCEDURE — 4040F PNEUMOC VAC/ADMIN/RCVD: CPT | Performed by: INTERNAL MEDICINE

## 2018-12-12 PROCEDURE — 1036F TOBACCO NON-USER: CPT | Performed by: INTERNAL MEDICINE

## 2018-12-12 PROCEDURE — 36415 COLL VENOUS BLD VENIPUNCTURE: CPT

## 2018-12-12 PROCEDURE — G0463 HOSPITAL OUTPT CLINIC VISIT: HCPCS

## 2018-12-12 PROCEDURE — 99215 OFFICE O/P EST HI 40 MIN: CPT | Performed by: INTERNAL MEDICINE

## 2018-12-12 PROCEDURE — 80047 BASIC METABLC PNL IONIZED CA: CPT

## 2018-12-12 PROCEDURE — 1123F ACP DISCUSS/DSCN MKR DOCD: CPT | Performed by: INTERNAL MEDICINE

## 2018-12-12 PROCEDURE — 85025 COMPLETE CBC W/AUTO DIFF WBC: CPT

## 2018-12-12 PROCEDURE — G8484 FLU IMMUNIZE NO ADMIN: HCPCS | Performed by: INTERNAL MEDICINE

## 2018-12-12 PROCEDURE — G8598 ASA/ANTIPLAT THER USED: HCPCS | Performed by: INTERNAL MEDICINE

## 2018-12-12 PROCEDURE — 6360000002 HC RX W HCPCS: Performed by: INTERNAL MEDICINE

## 2018-12-12 PROCEDURE — G8417 CALC BMI ABV UP PARAM F/U: HCPCS | Performed by: INTERNAL MEDICINE

## 2018-12-12 RX ORDER — ISOSORBIDE MONONITRATE 30 MG/1
30 TABLET, EXTENDED RELEASE ORAL DAILY
COMMUNITY
Start: 2018-11-20 | End: 2019-03-06 | Stop reason: ALTCHOICE

## 2018-12-12 RX ORDER — VIT C/B6/B5/MAGNESIUM/HERB 173 50-5-6-5MG
500 CAPSULE ORAL DAILY
COMMUNITY

## 2018-12-12 RX ADMIN — OCTREOTIDE ACETATE 30 MG: KIT at 13:23

## 2018-12-12 NOTE — PATIENT INSTRUCTIONS
1.  Octreotide today. 2.  Octreotide and labs on 01/09/2018. 3.  CT scans prior to RTC. 4.  Labs on RTC. 5.  Ocreotide on RTC.

## 2018-12-13 NOTE — PLAN OF CARE
Problem: Intellectual/Education/Knowledge Deficit  Intervention: Verbal/written education provided  Patient verbalizes understanding to verbal information given on sandostatin,action and possible side effects. Aware to call MD if develop complications. Goal: Teaching initiated upon admission  Outcome: Met This Shift  Patient verbalizes understanding to verbal information given on sandostatin,action and possible side effects. Aware to call MD if develop complications. Problem: Discharge Planning  Intervention: Discharge to appropriate level of care  Discuss discharge instructions, follow ups and when to call doctor. Goal: Knowledge of discharge instructions  Knowledge of discharge instructions    Outcome: Met This Shift  Verbalized understanding of discharge instructions, follow ups and when to call doctor    Comments: Care plan reviewed with patient. Patient verbalized understanding of the plan of care and contribute to goal setting.

## 2018-12-13 NOTE — PROGRESS NOTES
Sandostatin given as charted. Tolerated well.  Discharged in satisfactory condition, ambulated off unit per self

## 2018-12-31 NOTE — PROGRESS NOTES
[facility] CANCER CENTER  CANCER NETWORK OF [facility]  [facility] [address] [facility]  [address], [facility] [address] [place] [address]  Dept: [phone]  Dept Fax: [phone]  Loc: [phone]    Subjective:      Chief Complaint: Jose J Blackmon is a 80 y.o. male. The patient has been treated for malignant carcinoid syndrome. He was diagnosed in December of 2012 when he presented with abdominal pain. An abdominal CAT scan demonstrated intra-abdominal adenopathy and disease in the liver. He underwent a liver biopsy on November 11, 2012 which confirmed carcinoid tumor. An octreotide scan confirmed disease in the liver and abdominal lymph nodes. At the same time there was a mass noted in the left kidney that was concerning for a second primary malignancy. A CT directed biopsy was canceled by the radiologist and before an open biopsy could be scheduled, and the patient experienced a myocardial infarction. He had three stents placed and takes anti-platelet medication routinely. He subsequently developed problems with urinary retention and was found to have prostatic hypertrophy. He had a PSA of 7. He was advised that he was a candidate for a TURP but could not have this procedure done because of the myocardial infarction. A follow up abdominal CAT scan was completed on October 9, 2013. This demonstrated an increase in the size of lesions in the liver, abdominal lymph nodes and kidney. 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 Linda See developed an increase of right upper quadrant and mid abdominal pain in March, 2014. He was also having increasing episodes of diarrhea.  He was referred for an abdominal CAT scan completed on 3/11/2014 that demonstrated enlargement of all areas of hepatic metastasis compared to a CAT scan obtained on Elderly, frail. No acute distress. HENT: Normocephalic and atraumatic. Eyes: Pupils are equal and reactive. No scleral icterus. Neck: Overall appearance is symmetrical. No identifiable masses. Chest: Inspection and palpation of chest is normal.  Pulmonary: Effort normal. No respiratory distress. Cardiovascular: Regular rate and rhythm normal S1 and S2. Abdominal: Soft. No hepatomegaly or splenomegaly. Musculoskeletal: Gait is abnormal. Muscle strength and tone decreased. Neurological: Alert and oriented to person, place, and time. Judgment and thought content normal.  Skin: Scattered ecchymosis noted on bilateral upper and lower extremities. Psychiatric: Mood and affect appropriate for the clinical situation. Behavior is normal.       Data Analysis:    Hematology 12/12/2018 11/18/2018 11/14/2018   WBC 8.7 8.5 7.7   RBC 4.56 (L) 4.35 4.52 (L)   HGB 13.0 (L) 12.7 (A) 13.1 (L)   HCT 38.8 (L) 38.8 (A) 39.5 (L)   MCV 85 89.3 87   RDW 12.5  12.5    160 182     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 2. Renal cell carcinoma. 3. Therapy with Octreotide. 4. Chronic anemia. Plan:   1. Continue therapy with Octreotide monthly. 2.  Monitor for progression of malignancy. 3.  Monitor for side effects and toxicity from Octreotide. 4.  Monitor hemoglobin/hematocrit and for any signs of blood loss.     Fco Arellano M.D.  [address] [facility], 1 [facility], [address] [place] [address], [address] [place] [address]

## 2019-01-07 ENCOUNTER — OFFICE VISIT (OUTPATIENT)
Dept: UROLOGY | Age: 84
End: 2019-01-07
Payer: MEDICARE

## 2019-01-07 VITALS
SYSTOLIC BLOOD PRESSURE: 138 MMHG | DIASTOLIC BLOOD PRESSURE: 76 MMHG | BODY MASS INDEX: 26.1 KG/M2 | HEIGHT: 68 IN | WEIGHT: 172.2 LBS

## 2019-01-07 DIAGNOSIS — C64.9 MALIGNANT NEOPLASM OF KIDNEY, UNSPECIFIED LATERALITY (HCC): Primary | ICD-10-CM

## 2019-01-07 DIAGNOSIS — R35.0 URINARY FREQUENCY: ICD-10-CM

## 2019-01-07 LAB
BILIRUBIN URINE: NEGATIVE
BLOOD URINE, POC: ABNORMAL
CHARACTER, URINE: CLEAR
COLOR, URINE: YELLOW
GLUCOSE URINE: NEGATIVE MG/DL
KETONES, URINE: NEGATIVE
LEUKOCYTE CLUMPS, URINE: NEGATIVE
NITRITE, URINE: NEGATIVE
PH, URINE: 5
POST VOID RESIDUAL (PVR): 166 ML
PROTEIN, URINE: NEGATIVE MG/DL
SPECIFIC GRAVITY, URINE: 1.02 (ref 1–1.03)
UROBILINOGEN, URINE: 0.2 EU/DL

## 2019-01-07 PROCEDURE — G8484 FLU IMMUNIZE NO ADMIN: HCPCS | Performed by: UROLOGY

## 2019-01-07 PROCEDURE — G8427 DOCREV CUR MEDS BY ELIG CLIN: HCPCS | Performed by: UROLOGY

## 2019-01-07 PROCEDURE — 99214 OFFICE O/P EST MOD 30 MIN: CPT | Performed by: UROLOGY

## 2019-01-07 PROCEDURE — 81003 URINALYSIS AUTO W/O SCOPE: CPT | Performed by: UROLOGY

## 2019-01-07 PROCEDURE — G8417 CALC BMI ABV UP PARAM F/U: HCPCS | Performed by: UROLOGY

## 2019-01-07 PROCEDURE — 1036F TOBACCO NON-USER: CPT | Performed by: UROLOGY

## 2019-01-07 PROCEDURE — G8598 ASA/ANTIPLAT THER USED: HCPCS | Performed by: UROLOGY

## 2019-01-07 PROCEDURE — 1123F ACP DISCUSS/DSCN MKR DOCD: CPT | Performed by: UROLOGY

## 2019-01-07 PROCEDURE — 4040F PNEUMOC VAC/ADMIN/RCVD: CPT | Performed by: UROLOGY

## 2019-01-07 PROCEDURE — 1101F PT FALLS ASSESS-DOCD LE1/YR: CPT | Performed by: UROLOGY

## 2019-01-07 PROCEDURE — 51798 US URINE CAPACITY MEASURE: CPT | Performed by: UROLOGY

## 2019-01-07 ASSESSMENT — ENCOUNTER SYMPTOMS
FACIAL SWELLING: 0
EYE REDNESS: 0
NAUSEA: 0
COLOR CHANGE: 0
EYE PAIN: 0
SHORTNESS OF BREATH: 1
BACK PAIN: 0
CHEST TIGHTNESS: 0
ABDOMINAL PAIN: 0

## 2019-01-09 ENCOUNTER — HOSPITAL ENCOUNTER (OUTPATIENT)
Dept: INFUSION THERAPY | Age: 84
Discharge: HOME | End: 2019-01-09
Payer: MEDICARE

## 2019-01-09 VITALS
BODY MASS INDEX: 26.39 KG/M2 | RESPIRATION RATE: 18 BRPM | SYSTOLIC BLOOD PRESSURE: 112 MMHG | TEMPERATURE: 98.8 F | DIASTOLIC BLOOD PRESSURE: 76 MMHG | OXYGEN SATURATION: 100 % | WEIGHT: 171 LBS | HEART RATE: 68 BPM

## 2019-01-09 DIAGNOSIS — D3A.00 CARCINOID TUMOR: ICD-10-CM

## 2019-01-09 DIAGNOSIS — C64.9 MALIGNANT NEOPLASM OF KIDNEY, UNSPECIFIED LATERALITY (HCC): ICD-10-CM

## 2019-01-09 LAB
ALBUMIN SERPL-MCNC: 3.9 G/DL (ref 3.5–5.1)
ALP BLD-CCNC: 206 U/L (ref 38–126)
ALT SERPL-CCNC: 15 U/L (ref 11–66)
AST SERPL-CCNC: 16 U/L (ref 5–40)
BASOPHILS # BLD: 1 %
BASOPHILS ABSOLUTE: 0.1 THOU/MM3 (ref 0–0.1)
BILIRUB SERPL-MCNC: 0.5 MG/DL (ref 0.3–1.2)
BILIRUBIN DIRECT: < 0.2 MG/DL (ref 0–0.3)
BUN, WHOLE BLOOD: 13 MG/DL (ref 8–26)
CHLORIDE, WHOLE BLOOD: 99 MEQ/L (ref 98–109)
CREATININE, WHOLE BLOOD: 1 MG/DL (ref 0.5–1.2)
EOSINOPHIL # BLD: 8.9 %
EOSINOPHILS ABSOLUTE: 0.7 THOU/MM3 (ref 0–0.4)
GFR, ESTIMATED ,CON: 75 ML/MIN/1.73M2
GLUCOSE, WHOLE BLOOD: 100 MG/DL (ref 70–108)
HCT VFR BLD CALC: 40.1 % (ref 42–52)
HEMOGLOBIN: 13.5 GM/DL (ref 14–18)
IMMATURE GRANS (ABS): 0.02 THOU/MM3 (ref 0–0.07)
IMMATURE GRANULOCYTES: 0.3 %
IONIZED CALCIUM, WHOLE BLOOD: 1.14 MMOL/L (ref 1.12–1.32)
LYMPHOCYTES # BLD: 28.4 %
LYMPHOCYTES ABSOLUTE: 2.3 THOU/MM3 (ref 1–4.8)
MCH RBC QN AUTO: 28.4 PG (ref 27–31)
MCHC RBC AUTO-ENTMCNC: 33.7 GM/DL (ref 33–37)
MCV RBC AUTO: 84 FL (ref 80–94)
MONOCYTES # BLD: 7.3 %
MONOCYTES ABSOLUTE: 0.6 THOU/MM3 (ref 0.4–1.3)
NUCLEATED RED BLOOD CELLS: 0 /100 WBC
PDW BLD-RTO: 12.8 % (ref 11.5–14.5)
PLATELET # BLD: 190 THOU/MM3 (ref 130–400)
PMV BLD AUTO: 9.5 FL (ref 7.4–10.4)
POTASSIUM, WHOLE BLOOD: 4 MEQ/L (ref 3.5–4.9)
RBC # BLD: 4.76 MILL/MM3 (ref 4.7–6.1)
SEG NEUTROPHILS: 54.1 %
SEGMENTED NEUTROPHILS ABSOLUTE COUNT: 4.3 THOU/MM3 (ref 1.8–7.7)
SODIUM, WHOLE BLOOD: 139 MEQ/L (ref 138–146)
TOTAL CO2, WHOLE BLOOD: 26 MEQ/L (ref 23–33)
TOTAL PROTEIN: 7 G/DL (ref 6.1–8)
WBC # BLD: 8 THOU/MM3 (ref 4.8–10.8)

## 2019-01-09 PROCEDURE — 96372 THER/PROPH/DIAG INJ SC/IM: CPT

## 2019-01-09 PROCEDURE — 85025 COMPLETE CBC W/AUTO DIFF WBC: CPT

## 2019-01-09 PROCEDURE — 80047 BASIC METABLC PNL IONIZED CA: CPT

## 2019-01-09 PROCEDURE — 86316 IMMUNOASSAY TUMOR OTHER: CPT

## 2019-01-09 PROCEDURE — 6360000002 HC RX W HCPCS: Performed by: INTERNAL MEDICINE

## 2019-01-09 PROCEDURE — 80076 HEPATIC FUNCTION PANEL: CPT

## 2019-01-09 PROCEDURE — 36415 COLL VENOUS BLD VENIPUNCTURE: CPT

## 2019-01-09 RX ORDER — 0.9 % SODIUM CHLORIDE 0.9 %
10 VIAL (ML) INJECTION ONCE
Status: CANCELLED | OUTPATIENT
Start: 2019-01-09 | End: 2019-01-09

## 2019-01-09 RX ORDER — METHYLPREDNISOLONE SODIUM SUCCINATE 125 MG/2ML
125 INJECTION, POWDER, LYOPHILIZED, FOR SOLUTION INTRAMUSCULAR; INTRAVENOUS ONCE
Status: CANCELLED | OUTPATIENT
Start: 2019-01-09 | End: 2019-01-09

## 2019-01-09 RX ORDER — HEPARIN SODIUM (PORCINE) LOCK FLUSH IV SOLN 100 UNIT/ML 100 UNIT/ML
500 SOLUTION INTRAVENOUS PRN
Status: CANCELLED | OUTPATIENT
Start: 2019-01-09

## 2019-01-09 RX ORDER — SODIUM CHLORIDE 0.9 % (FLUSH) 0.9 %
10 SYRINGE (ML) INJECTION PRN
Status: CANCELLED | OUTPATIENT
Start: 2019-01-09

## 2019-01-09 RX ORDER — SODIUM CHLORIDE 9 MG/ML
INJECTION, SOLUTION INTRAVENOUS CONTINUOUS
Status: CANCELLED | OUTPATIENT
Start: 2019-01-09

## 2019-01-09 RX ORDER — DIPHENHYDRAMINE HYDROCHLORIDE 50 MG/ML
50 INJECTION INTRAMUSCULAR; INTRAVENOUS ONCE
Status: CANCELLED | OUTPATIENT
Start: 2019-01-09 | End: 2019-01-09

## 2019-01-09 RX ADMIN — OCTREOTIDE ACETATE 30 MG: KIT at 10:33

## 2019-01-09 ASSESSMENT — PAIN SCALES - GENERAL
PAINLEVEL_OUTOF10: 0
PAINLEVEL_OUTOF10: 0

## 2019-01-09 NOTE — PLAN OF CARE
Problem: Musculor/Skeletal Functional Status  Intervention: Fall precautions  Patient area of fall precautions for here and at home -call light in reach while here     Goal: Absence of falls  Outcome: Met This Shift  No falls this admission     Problem: Intellectual/Education/Knowledge Deficit  Intervention: Verbal/written education provided  Discharge instruction sheet     Goal: Teaching initiated upon admission  Outcome: Met This Shift  Reviewed sandostatin lar with patient, patient offered no questions or concerns. Patient verbalized understanding of drug being administered. Goal: Written Disposition Instruction form completed  Outcome: Met This Shift  Discharge instructions given and reviewed with patient. All questions answered. Patient verbalized understanding    Problem: Discharge Planning  Intervention: Interaction with patient/family and care team  Patient currently denies any needs or concerns  Intervention: Discharge to appropriate level of care  Discharge home    Goal: Knowledge of discharge instructions  Knowledge of discharge instructions    Outcome: Met This Shift  Patient  able to teach back follow up appointments and when to call the doctor. Patient offers no questions at this time    Comments: Care plan reviewed with patient and he verbalized understanding of the plan of care and contributed to goal setting.

## 2019-01-13 LAB — CHROMOGRANIN A: 3469 NG/ML (ref 0–95)

## 2019-01-28 ENCOUNTER — TELEPHONE (OUTPATIENT)
Dept: UROLOGY | Age: 84
End: 2019-01-28

## 2019-01-28 ENCOUNTER — PROCEDURE VISIT (OUTPATIENT)
Dept: UROLOGY | Age: 84
End: 2019-01-28
Payer: MEDICARE

## 2019-01-28 VITALS
WEIGHT: 171 LBS | BODY MASS INDEX: 25.91 KG/M2 | SYSTOLIC BLOOD PRESSURE: 112 MMHG | HEIGHT: 68 IN | DIASTOLIC BLOOD PRESSURE: 88 MMHG

## 2019-01-28 DIAGNOSIS — Z01.818 PRE-OP TESTING: Primary | ICD-10-CM

## 2019-01-28 DIAGNOSIS — N13.8 BPH WITH OBSTRUCTION/LOWER URINARY TRACT SYMPTOMS: Primary | ICD-10-CM

## 2019-01-28 DIAGNOSIS — N13.8 BPH WITH OBSTRUCTION/LOWER URINARY TRACT SYMPTOMS: ICD-10-CM

## 2019-01-28 DIAGNOSIS — N40.1 BPH WITH OBSTRUCTION/LOWER URINARY TRACT SYMPTOMS: Primary | ICD-10-CM

## 2019-01-28 DIAGNOSIS — N40.1 BPH WITH OBSTRUCTION/LOWER URINARY TRACT SYMPTOMS: ICD-10-CM

## 2019-01-28 LAB
BILIRUBIN URINE: NEGATIVE
BLOOD URINE, POC: NORMAL
CHARACTER, URINE: CLEAR
COLOR, URINE: YELLOW
GLUCOSE URINE: NEGATIVE MG/DL
KETONES, URINE: NEGATIVE
LEUKOCYTE CLUMPS, URINE: NEGATIVE
NITRITE, URINE: NEGATIVE
PH, URINE: 6
POST VOID RESIDUAL (PVR): 143 ML
PROTEIN, URINE: NEGATIVE MG/DL
SPECIFIC GRAVITY, URINE: 1.01 (ref 1–1.03)
UROBILINOGEN, URINE: 0.2 EU/DL

## 2019-01-28 PROCEDURE — 81003 URINALYSIS AUTO W/O SCOPE: CPT | Performed by: UROLOGY

## 2019-01-28 PROCEDURE — 52000 CYSTOURETHROSCOPY: CPT | Performed by: UROLOGY

## 2019-01-28 PROCEDURE — 51798 US URINE CAPACITY MEASURE: CPT | Performed by: UROLOGY

## 2019-01-28 PROCEDURE — 99999 PR OFFICE/OUTPT VISIT,PROCEDURE ONLY: CPT | Performed by: UROLOGY

## 2019-02-05 RX ORDER — 0.9 % SODIUM CHLORIDE 0.9 %
10 VIAL (ML) INJECTION ONCE
Status: CANCELLED | OUTPATIENT
Start: 2019-02-06 | End: 2019-02-06

## 2019-02-05 RX ORDER — HEPARIN SODIUM (PORCINE) LOCK FLUSH IV SOLN 100 UNIT/ML 100 UNIT/ML
500 SOLUTION INTRAVENOUS PRN
Status: CANCELLED | OUTPATIENT
Start: 2019-02-06

## 2019-02-05 RX ORDER — METHYLPREDNISOLONE SODIUM SUCCINATE 125 MG/2ML
125 INJECTION, POWDER, LYOPHILIZED, FOR SOLUTION INTRAMUSCULAR; INTRAVENOUS ONCE
Status: CANCELLED | OUTPATIENT
Start: 2019-02-06 | End: 2019-02-06

## 2019-02-05 RX ORDER — SODIUM CHLORIDE 9 MG/ML
INJECTION, SOLUTION INTRAVENOUS CONTINUOUS
Status: CANCELLED | OUTPATIENT
Start: 2019-02-06

## 2019-02-05 RX ORDER — DIPHENHYDRAMINE HYDROCHLORIDE 50 MG/ML
50 INJECTION INTRAMUSCULAR; INTRAVENOUS ONCE
Status: CANCELLED | OUTPATIENT
Start: 2019-02-06 | End: 2019-02-06

## 2019-02-05 RX ORDER — SODIUM CHLORIDE 0.9 % (FLUSH) 0.9 %
10 SYRINGE (ML) INJECTION PRN
Status: CANCELLED | OUTPATIENT
Start: 2019-02-06

## 2019-02-06 ENCOUNTER — OFFICE VISIT (OUTPATIENT)
Dept: ONCOLOGY | Age: 84
End: 2019-02-06
Payer: MEDICARE

## 2019-02-06 ENCOUNTER — HOSPITAL ENCOUNTER (OUTPATIENT)
Dept: INFUSION THERAPY | Age: 84
Discharge: HOME | End: 2019-02-06
Payer: MEDICARE

## 2019-02-06 VITALS
TEMPERATURE: 97.6 F | HEIGHT: 68 IN | RESPIRATION RATE: 16 BRPM | DIASTOLIC BLOOD PRESSURE: 77 MMHG | WEIGHT: 169 LBS | SYSTOLIC BLOOD PRESSURE: 127 MMHG | BODY MASS INDEX: 25.61 KG/M2 | OXYGEN SATURATION: 97 % | HEART RATE: 74 BPM

## 2019-02-06 VITALS
RESPIRATION RATE: 16 BRPM | OXYGEN SATURATION: 96 % | TEMPERATURE: 97.9 F | HEART RATE: 68 BPM | DIASTOLIC BLOOD PRESSURE: 75 MMHG | SYSTOLIC BLOOD PRESSURE: 133 MMHG

## 2019-02-06 DIAGNOSIS — Z51.81 ENCOUNTER FOR MONITORING OCTREOTIDE THERAPY: ICD-10-CM

## 2019-02-06 DIAGNOSIS — D3A.00 CARCINOID TUMOR: ICD-10-CM

## 2019-02-06 DIAGNOSIS — D64.9 CHRONIC ANEMIA: Primary | ICD-10-CM

## 2019-02-06 DIAGNOSIS — R10.84 GENERALIZED ABDOMINAL PAIN: ICD-10-CM

## 2019-02-06 DIAGNOSIS — Z79.899 ENCOUNTER FOR MONITORING OCTREOTIDE THERAPY: ICD-10-CM

## 2019-02-06 DIAGNOSIS — C64.9 MALIGNANT NEOPLASM OF KIDNEY, UNSPECIFIED LATERALITY (HCC): ICD-10-CM

## 2019-02-06 LAB
ALBUMIN SERPL-MCNC: 3.7 G/DL (ref 3.5–5.1)
ALP BLD-CCNC: 215 U/L (ref 38–126)
ALT SERPL-CCNC: 15 U/L (ref 11–66)
AST SERPL-CCNC: 15 U/L (ref 5–40)
BASOPHILS # BLD: 0.9 %
BASOPHILS ABSOLUTE: 0.1 THOU/MM3 (ref 0–0.1)
BILIRUB SERPL-MCNC: 0.4 MG/DL (ref 0.3–1.2)
BILIRUBIN DIRECT: < 0.2 MG/DL (ref 0–0.3)
BUN, WHOLE BLOOD: 12 MG/DL (ref 8–26)
CHLORIDE, WHOLE BLOOD: 98 MEQ/L (ref 98–109)
CREATININE, WHOLE BLOOD: 1 MG/DL (ref 0.5–1.2)
EOSINOPHIL # BLD: 6.7 %
EOSINOPHILS ABSOLUTE: 0.5 THOU/MM3 (ref 0–0.4)
ERYTHROCYTE [DISTWIDTH] IN BLOOD BY AUTOMATED COUNT: 15.2 % (ref 11.5–14.5)
ERYTHROCYTE [DISTWIDTH] IN BLOOD BY AUTOMATED COUNT: 47.4 FL (ref 35–45)
GFR, ESTIMATED ,CON: 75 ML/MIN/1.73M2
GLUCOSE, WHOLE BLOOD: 202 MG/DL (ref 70–108)
HCT VFR BLD CALC: 37.9 % (ref 42–52)
HEMOGLOBIN: 12.2 GM/DL (ref 14–18)
IMMATURE GRANS (ABS): 0.03 THOU/MM3 (ref 0–0.07)
IMMATURE GRANULOCYTES: 0.4 %
IONIZED CALCIUM, WHOLE BLOOD: 1.13 MMOL/L (ref 1.12–1.32)
LYMPHOCYTES # BLD: 22.8 %
LYMPHOCYTES ABSOLUTE: 1.6 THOU/MM3 (ref 1–4.8)
MCH RBC QN AUTO: 27.7 PG (ref 26–33)
MCHC RBC AUTO-ENTMCNC: 32.2 GM/DL (ref 32.2–35.5)
MCV RBC AUTO: 85.9 FL (ref 80–94)
MONOCYTES # BLD: 8.3 %
MONOCYTES ABSOLUTE: 0.6 THOU/MM3 (ref 0.4–1.3)
NUCLEATED RED BLOOD CELLS: 0 /100 WBC
PLATELET # BLD: 264 THOU/MM3 (ref 130–400)
PMV BLD AUTO: 12.2 FL (ref 9.4–12.4)
POTASSIUM, WHOLE BLOOD: 4.1 MEQ/L (ref 3.5–4.9)
RBC # BLD: 4.41 MILL/MM3 (ref 4.7–6.1)
SEG NEUTROPHILS: 60.9 %
SEGMENTED NEUTROPHILS ABSOLUTE COUNT: 4.1 THOU/MM3 (ref 1.8–7.7)
SODIUM, WHOLE BLOOD: 136 MEQ/L (ref 138–146)
TOTAL CO2, WHOLE BLOOD: 27 MEQ/L (ref 23–33)
TOTAL PROTEIN: 6.5 G/DL (ref 6.1–8)
WBC # BLD: 6.8 THOU/MM3 (ref 4.8–10.8)

## 2019-02-06 PROCEDURE — 4040F PNEUMOC VAC/ADMIN/RCVD: CPT | Performed by: INTERNAL MEDICINE

## 2019-02-06 PROCEDURE — 1123F ACP DISCUSS/DSCN MKR DOCD: CPT | Performed by: INTERNAL MEDICINE

## 2019-02-06 PROCEDURE — G8427 DOCREV CUR MEDS BY ELIG CLIN: HCPCS | Performed by: INTERNAL MEDICINE

## 2019-02-06 PROCEDURE — 80047 BASIC METABLC PNL IONIZED CA: CPT

## 2019-02-06 PROCEDURE — 96372 THER/PROPH/DIAG INJ SC/IM: CPT

## 2019-02-06 PROCEDURE — G8417 CALC BMI ABV UP PARAM F/U: HCPCS | Performed by: INTERNAL MEDICINE

## 2019-02-06 PROCEDURE — 36415 COLL VENOUS BLD VENIPUNCTURE: CPT

## 2019-02-06 PROCEDURE — 80076 HEPATIC FUNCTION PANEL: CPT

## 2019-02-06 PROCEDURE — 85025 COMPLETE CBC W/AUTO DIFF WBC: CPT

## 2019-02-06 PROCEDURE — 99215 OFFICE O/P EST HI 40 MIN: CPT | Performed by: INTERNAL MEDICINE

## 2019-02-06 PROCEDURE — G0463 HOSPITAL OUTPT CLINIC VISIT: HCPCS

## 2019-02-06 PROCEDURE — 1101F PT FALLS ASSESS-DOCD LE1/YR: CPT | Performed by: INTERNAL MEDICINE

## 2019-02-06 PROCEDURE — 6360000002 HC RX W HCPCS: Performed by: INTERNAL MEDICINE

## 2019-02-06 PROCEDURE — 1036F TOBACCO NON-USER: CPT | Performed by: INTERNAL MEDICINE

## 2019-02-06 PROCEDURE — G8484 FLU IMMUNIZE NO ADMIN: HCPCS | Performed by: INTERNAL MEDICINE

## 2019-02-06 PROCEDURE — G8598 ASA/ANTIPLAT THER USED: HCPCS | Performed by: INTERNAL MEDICINE

## 2019-02-06 RX ADMIN — OCTREOTIDE ACETATE 30 MG: KIT at 09:32

## 2019-02-26 ENCOUNTER — TELEPHONE (OUTPATIENT)
Dept: UROLOGY | Age: 84
End: 2019-02-26

## 2019-03-06 ENCOUNTER — HOSPITAL ENCOUNTER (OUTPATIENT)
Dept: INFUSION THERAPY | Age: 84
Discharge: HOME | End: 2019-03-06
Payer: MEDICARE

## 2019-03-06 VITALS
DIASTOLIC BLOOD PRESSURE: 63 MMHG | HEIGHT: 68 IN | OXYGEN SATURATION: 100 % | RESPIRATION RATE: 18 BRPM | HEART RATE: 69 BPM | SYSTOLIC BLOOD PRESSURE: 134 MMHG | WEIGHT: 166.4 LBS | BODY MASS INDEX: 25.22 KG/M2 | TEMPERATURE: 97.6 F

## 2019-03-06 DIAGNOSIS — D3A.00 CARCINOID TUMOR: ICD-10-CM

## 2019-03-06 DIAGNOSIS — D64.9 CHRONIC ANEMIA: Primary | ICD-10-CM

## 2019-03-06 LAB
ALBUMIN SERPL-MCNC: 3.9 G/DL (ref 3.5–5.1)
ALP BLD-CCNC: 308 U/L (ref 38–126)
ALT SERPL-CCNC: 28 U/L (ref 11–66)
AST SERPL-CCNC: 22 U/L (ref 5–40)
BASOPHILS # BLD: 1.1 %
BASOPHILS ABSOLUTE: 0.1 THOU/MM3 (ref 0–0.1)
BILIRUB SERPL-MCNC: 0.5 MG/DL (ref 0.3–1.2)
BILIRUBIN DIRECT: < 0.2 MG/DL (ref 0–0.3)
BUN, WHOLE BLOOD: 16 MG/DL (ref 8–26)
CHLORIDE, WHOLE BLOOD: 98 MEQ/L (ref 98–109)
CREATININE, WHOLE BLOOD: 1 MG/DL (ref 0.5–1.2)
EOSINOPHIL # BLD: 6 %
EOSINOPHILS ABSOLUTE: 0.5 THOU/MM3 (ref 0–0.4)
GFR, ESTIMATED ,CON: 75 ML/MIN/1.73M2
GLUCOSE, WHOLE BLOOD: 101 MG/DL (ref 70–108)
HCT VFR BLD CALC: 39.1 % (ref 42–52)
HEMOGLOBIN: 12.6 GM/DL (ref 14–18)
IMMATURE GRANS (ABS): 0.05 THOU/MM3 (ref 0–0.07)
IMMATURE GRANULOCYTES: 0.6 %
IONIZED CALCIUM, WHOLE BLOOD: 1.14 MMOL/L (ref 1.12–1.32)
LYMPHOCYTES # BLD: 25.4 %
LYMPHOCYTES ABSOLUTE: 2.1 THOU/MM3 (ref 1–4.8)
MCH RBC QN AUTO: 27.4 PG (ref 27–31)
MCHC RBC AUTO-ENTMCNC: 32.3 GM/DL (ref 33–37)
MCV RBC AUTO: 85 FL (ref 80–94)
MONOCYTES # BLD: 8.1 %
MONOCYTES ABSOLUTE: 0.7 THOU/MM3 (ref 0.4–1.3)
NUCLEATED RED BLOOD CELLS: 0 /100 WBC
PDW BLD-RTO: 13.6 % (ref 11.5–14.5)
PLATELET # BLD: 350 THOU/MM3 (ref 130–400)
PMV BLD AUTO: 7.9 FL (ref 7.4–10.4)
POTASSIUM, WHOLE BLOOD: 4.2 MEQ/L (ref 3.5–4.9)
RBC # BLD: 4.62 MILL/MM3 (ref 4.7–6.1)
SEG NEUTROPHILS: 58.8 %
SEGMENTED NEUTROPHILS ABSOLUTE COUNT: 4.9 THOU/MM3 (ref 1.8–7.7)
SODIUM, WHOLE BLOOD: 137 MEQ/L (ref 138–146)
TOTAL CO2, WHOLE BLOOD: 29 MEQ/L (ref 23–33)
TOTAL PROTEIN: 7.1 G/DL (ref 6.1–8)
WBC # BLD: 8.3 THOU/MM3 (ref 4.8–10.8)

## 2019-03-06 PROCEDURE — 6360000002 HC RX W HCPCS: Performed by: INTERNAL MEDICINE

## 2019-03-06 PROCEDURE — 36415 COLL VENOUS BLD VENIPUNCTURE: CPT

## 2019-03-06 PROCEDURE — 96372 THER/PROPH/DIAG INJ SC/IM: CPT

## 2019-03-06 PROCEDURE — 80076 HEPATIC FUNCTION PANEL: CPT

## 2019-03-06 PROCEDURE — 85025 COMPLETE CBC W/AUTO DIFF WBC: CPT

## 2019-03-06 PROCEDURE — 80047 BASIC METABLC PNL IONIZED CA: CPT

## 2019-03-06 RX ORDER — METHYLPREDNISOLONE SODIUM SUCCINATE 125 MG/2ML
125 INJECTION, POWDER, LYOPHILIZED, FOR SOLUTION INTRAMUSCULAR; INTRAVENOUS ONCE
Status: CANCELLED | OUTPATIENT
Start: 2019-04-03 | End: 2019-04-03

## 2019-03-06 RX ORDER — SODIUM CHLORIDE 9 MG/ML
INJECTION, SOLUTION INTRAVENOUS CONTINUOUS
Status: CANCELLED | OUTPATIENT
Start: 2019-04-03

## 2019-03-06 RX ORDER — METHYLPREDNISOLONE SODIUM SUCCINATE 125 MG/2ML
125 INJECTION, POWDER, LYOPHILIZED, FOR SOLUTION INTRAMUSCULAR; INTRAVENOUS ONCE
Status: CANCELLED | OUTPATIENT
Start: 2019-03-06 | End: 2019-03-06

## 2019-03-06 RX ORDER — 0.9 % SODIUM CHLORIDE 0.9 %
10 VIAL (ML) INJECTION ONCE
Status: CANCELLED | OUTPATIENT
Start: 2019-04-03 | End: 2019-04-03

## 2019-03-06 RX ORDER — SODIUM CHLORIDE 0.9 % (FLUSH) 0.9 %
10 SYRINGE (ML) INJECTION PRN
Status: CANCELLED | OUTPATIENT
Start: 2019-04-03

## 2019-03-06 RX ORDER — HEPARIN SODIUM (PORCINE) LOCK FLUSH IV SOLN 100 UNIT/ML 100 UNIT/ML
500 SOLUTION INTRAVENOUS PRN
Status: CANCELLED | OUTPATIENT
Start: 2019-03-06

## 2019-03-06 RX ORDER — HEPARIN SODIUM (PORCINE) LOCK FLUSH IV SOLN 100 UNIT/ML 100 UNIT/ML
500 SOLUTION INTRAVENOUS PRN
Status: CANCELLED | OUTPATIENT
Start: 2019-04-03

## 2019-03-06 RX ORDER — DIPHENHYDRAMINE HYDROCHLORIDE 50 MG/ML
50 INJECTION INTRAMUSCULAR; INTRAVENOUS ONCE
Status: CANCELLED | OUTPATIENT
Start: 2019-04-03 | End: 2019-04-03

## 2019-03-06 RX ORDER — SODIUM CHLORIDE 9 MG/ML
INJECTION, SOLUTION INTRAVENOUS CONTINUOUS
Status: CANCELLED | OUTPATIENT
Start: 2019-03-06

## 2019-03-06 RX ORDER — SODIUM CHLORIDE 0.9 % (FLUSH) 0.9 %
10 SYRINGE (ML) INJECTION PRN
Status: CANCELLED | OUTPATIENT
Start: 2019-03-06

## 2019-03-06 RX ORDER — DIPHENHYDRAMINE HYDROCHLORIDE 50 MG/ML
50 INJECTION INTRAMUSCULAR; INTRAVENOUS ONCE
Status: CANCELLED | OUTPATIENT
Start: 2019-03-06 | End: 2019-03-06

## 2019-03-06 RX ORDER — 0.9 % SODIUM CHLORIDE 0.9 %
10 VIAL (ML) INJECTION ONCE
Status: CANCELLED | OUTPATIENT
Start: 2019-03-06 | End: 2019-03-06

## 2019-03-06 RX ADMIN — OCTREOTIDE ACETATE 30 MG: KIT at 10:20

## 2019-03-06 NOTE — PLAN OF CARE
Problem: Intellectual/Education/Knowledge Deficit  Goal: Teaching initiated upon admission  Outcome: Met This Shift  Note:   Patient verbalizes understanding to verbal information given on sandostatin,action and possible side effects. Aware to call MD if develop complications. Intervention: Verbal/written education provided  Note:   Discuss sandostatin, action, side effects and when to call   Care plan reviewed with patient. Patient verbalized understanding of the plan of care and contribute to goal setting.

## 2019-04-03 ENCOUNTER — HOSPITAL ENCOUNTER (OUTPATIENT)
Dept: INFUSION THERAPY | Age: 84
Discharge: HOME | End: 2019-04-03
Payer: MEDICARE

## 2019-04-03 VITALS
DIASTOLIC BLOOD PRESSURE: 65 MMHG | HEART RATE: 64 BPM | WEIGHT: 168.4 LBS | BODY MASS INDEX: 25.97 KG/M2 | RESPIRATION RATE: 16 BRPM | SYSTOLIC BLOOD PRESSURE: 120 MMHG | OXYGEN SATURATION: 98 % | TEMPERATURE: 98.6 F

## 2019-04-03 DIAGNOSIS — D64.9 CHRONIC ANEMIA: Primary | ICD-10-CM

## 2019-04-03 DIAGNOSIS — D3A.00 CARCINOID TUMOR: ICD-10-CM

## 2019-04-03 LAB
ALBUMIN SERPL-MCNC: 3.6 G/DL (ref 3.5–5.1)
ALP BLD-CCNC: 218 U/L (ref 38–126)
ALT SERPL-CCNC: 18 U/L (ref 11–66)
AST SERPL-CCNC: 16 U/L (ref 5–40)
BASOPHILS # BLD: 0.7 %
BASOPHILS ABSOLUTE: 0.1 THOU/MM3 (ref 0–0.1)
BILIRUB SERPL-MCNC: 0.6 MG/DL (ref 0.3–1.2)
BILIRUBIN DIRECT: < 0.2 MG/DL (ref 0–0.3)
BUN, WHOLE BLOOD: 13 MG/DL (ref 8–26)
CHLORIDE, WHOLE BLOOD: 99 MEQ/L (ref 98–109)
CREATININE, WHOLE BLOOD: 0.9 MG/DL (ref 0.5–1.2)
EOSINOPHIL # BLD: 6.3 %
EOSINOPHILS ABSOLUTE: 0.5 THOU/MM3 (ref 0–0.4)
GFR, ESTIMATED ,CON: 85 ML/MIN/1.73M2
GLUCOSE, WHOLE BLOOD: 99 MG/DL (ref 70–108)
HCT VFR BLD CALC: 38.7 % (ref 42–52)
HEMOGLOBIN: 12.4 GM/DL (ref 14–18)
IMMATURE GRANS (ABS): 0.02 THOU/MM3 (ref 0–0.07)
IMMATURE GRANULOCYTES: 0.2 %
IONIZED CALCIUM, WHOLE BLOOD: 1.1 MMOL/L (ref 1.12–1.32)
LYMPHOCYTES # BLD: 24.7 %
LYMPHOCYTES ABSOLUTE: 2 THOU/MM3 (ref 1–4.8)
MCH RBC QN AUTO: 27.7 PG (ref 27–31)
MCHC RBC AUTO-ENTMCNC: 32 GM/DL (ref 33–37)
MCV RBC AUTO: 86 FL (ref 80–94)
MONOCYTES # BLD: 9.5 %
MONOCYTES ABSOLUTE: 0.8 THOU/MM3 (ref 0.4–1.3)
NUCLEATED RED BLOOD CELLS: 0 /100 WBC
PDW BLD-RTO: 15.5 % (ref 11.5–14.5)
PLATELET # BLD: 197 THOU/MM3 (ref 130–400)
PMV BLD AUTO: 9 FL (ref 7.4–10.4)
POTASSIUM, WHOLE BLOOD: 4.3 MEQ/L (ref 3.5–4.9)
RBC # BLD: 4.48 MILL/MM3 (ref 4.7–6.1)
SEG NEUTROPHILS: 58.6 %
SEGMENTED NEUTROPHILS ABSOLUTE COUNT: 4.7 THOU/MM3 (ref 1.8–7.7)
SODIUM, WHOLE BLOOD: 138 MEQ/L (ref 138–146)
TOTAL CO2, WHOLE BLOOD: 27 MEQ/L (ref 23–33)
TOTAL PROTEIN: 6.8 G/DL (ref 6.1–8)
WBC # BLD: 8.1 THOU/MM3 (ref 4.8–10.8)

## 2019-04-03 PROCEDURE — 6360000002 HC RX W HCPCS: Performed by: INTERNAL MEDICINE

## 2019-04-03 PROCEDURE — 80047 BASIC METABLC PNL IONIZED CA: CPT

## 2019-04-03 PROCEDURE — 85025 COMPLETE CBC W/AUTO DIFF WBC: CPT

## 2019-04-03 PROCEDURE — 36415 COLL VENOUS BLD VENIPUNCTURE: CPT

## 2019-04-03 PROCEDURE — 96372 THER/PROPH/DIAG INJ SC/IM: CPT

## 2019-04-03 PROCEDURE — 80076 HEPATIC FUNCTION PANEL: CPT

## 2019-04-03 RX ORDER — HEPARIN SODIUM (PORCINE) LOCK FLUSH IV SOLN 100 UNIT/ML 100 UNIT/ML
500 SOLUTION INTRAVENOUS PRN
Status: CANCELLED | OUTPATIENT
Start: 2019-04-03

## 2019-04-03 RX ORDER — AMIODARONE HYDROCHLORIDE 100 MG/1
100 TABLET ORAL NIGHTLY
COMMUNITY
End: 2020-01-23

## 2019-04-03 RX ORDER — SODIUM CHLORIDE 0.9 % (FLUSH) 0.9 %
10 SYRINGE (ML) INJECTION PRN
Status: CANCELLED | OUTPATIENT
Start: 2019-04-03

## 2019-04-03 RX ORDER — AMLODIPINE BESYLATE 5 MG/1
5 TABLET ORAL NIGHTLY
COMMUNITY
End: 2020-01-23

## 2019-04-03 RX ORDER — SODIUM CHLORIDE 0.9 % (FLUSH) 0.9 %
20 SYRINGE (ML) INJECTION PRN
Status: CANCELLED | OUTPATIENT
Start: 2019-04-03

## 2019-04-03 RX ADMIN — OCTREOTIDE ACETATE 30 MG: KIT at 10:45

## 2019-04-03 NOTE — PLAN OF CARE
Problem: Musculor/Skeletal Functional Status  Goal: Absence of falls  Outcome: Met This Shift  Note:   Patient verbalizes understanding of fall precautions. Patient free from falls this visit. Intervention: Fall precautions  Note:   Discussed fall precautions, call light within reach. Problem: Intellectual/Education/Knowledge Deficit  Goal: Teaching initiated upon admission  Outcome: Met This Shift  Note:   Patient verbalizes understanding to verbal information given on sandostatin,action and possible side effects. Aware to call MD if develop complications. Intervention: Verbal/written education provided  Note:   Discussed sandostatin action, possible side effects and when to call the doctor. Problem: Discharge Planning  Goal: Knowledge of discharge instructions  Description  Knowledge of discharge instructions     Outcome: Met This Shift  Note:   Verbalized understanding of discharge instructions, follow-up appointments, and when to call the physician. Intervention: Discharge to appropriate level of care  Note:   Discuss discharge instructions, follow ups, and when to call the doctor. Care plan reviewed with patient. Patient verbalize understanding of the plan of care and contribute to goal setting.

## 2019-05-01 ENCOUNTER — OFFICE VISIT (OUTPATIENT)
Dept: ONCOLOGY | Age: 84
End: 2019-05-01
Payer: MEDICARE

## 2019-05-01 ENCOUNTER — HOSPITAL ENCOUNTER (OUTPATIENT)
Dept: INFUSION THERAPY | Age: 84
Discharge: HOME | End: 2019-05-01
Payer: MEDICARE

## 2019-05-01 VITALS
SYSTOLIC BLOOD PRESSURE: 137 MMHG | RESPIRATION RATE: 16 BRPM | DIASTOLIC BLOOD PRESSURE: 71 MMHG | TEMPERATURE: 97.7 F | OXYGEN SATURATION: 99 % | HEART RATE: 60 BPM

## 2019-05-01 VITALS
HEART RATE: 69 BPM | RESPIRATION RATE: 16 BRPM | WEIGHT: 169.4 LBS | OXYGEN SATURATION: 100 % | SYSTOLIC BLOOD PRESSURE: 104 MMHG | HEIGHT: 68 IN | DIASTOLIC BLOOD PRESSURE: 58 MMHG | TEMPERATURE: 97.7 F | BODY MASS INDEX: 25.67 KG/M2

## 2019-05-01 DIAGNOSIS — Z79.899 ENCOUNTER FOR MONITORING OCTREOTIDE THERAPY: ICD-10-CM

## 2019-05-01 DIAGNOSIS — Z51.81 ENCOUNTER FOR MONITORING OCTREOTIDE THERAPY: ICD-10-CM

## 2019-05-01 DIAGNOSIS — D3A.00 CARCINOID TUMOR: Primary | ICD-10-CM

## 2019-05-01 DIAGNOSIS — D64.9 CHRONIC ANEMIA: Primary | ICD-10-CM

## 2019-05-01 DIAGNOSIS — D64.9 CHRONIC ANEMIA: ICD-10-CM

## 2019-05-01 DIAGNOSIS — D3A.00 CARCINOID TUMOR: ICD-10-CM

## 2019-05-01 DIAGNOSIS — C64.9 MALIGNANT NEOPLASM OF KIDNEY, UNSPECIFIED LATERALITY (HCC): ICD-10-CM

## 2019-05-01 LAB
ALBUMIN SERPL-MCNC: 3.5 G/DL (ref 3.5–5.1)
ALP BLD-CCNC: 229 U/L (ref 38–126)
ALT SERPL-CCNC: 20 U/L (ref 11–66)
AST SERPL-CCNC: 21 U/L (ref 5–40)
BILIRUB SERPL-MCNC: 0.4 MG/DL (ref 0.3–1.2)
BILIRUBIN DIRECT: < 0.2 MG/DL (ref 0–0.3)
BUN, WHOLE BLOOD: 13 MG/DL (ref 8–26)
CHLORIDE, WHOLE BLOOD: 99 MEQ/L (ref 98–109)
CREATININE, WHOLE BLOOD: 1 MG/DL (ref 0.5–1.2)
GFR, ESTIMATED ,CON: 75 ML/MIN/1.73M2
GLUCOSE, WHOLE BLOOD: 93 MG/DL (ref 70–108)
HCT VFR BLD CALC: 38.7 % (ref 42–52)
HEMOGLOBIN: 12.7 GM/DL (ref 14–18)
IONIZED CALCIUM, WHOLE BLOOD: 1.12 MMOL/L (ref 1.12–1.32)
MCH RBC QN AUTO: 28.4 PG (ref 27–31)
MCHC RBC AUTO-ENTMCNC: 32.8 GM/DL (ref 33–37)
MCV RBC AUTO: 87 FL (ref 80–94)
PDW BLD-RTO: 14.3 % (ref 11.5–14.5)
PLATELET # BLD: 191 THOU/MM3 (ref 130–400)
PMV BLD AUTO: 9.3 FL (ref 7.4–10.4)
POTASSIUM, WHOLE BLOOD: 4.2 MEQ/L (ref 3.5–4.9)
RBC # BLD: 4.46 MILL/MM3 (ref 4.7–6.1)
SEG NEUTROPHILS: 56 % (ref 43–75)
SEGMENTED NEUTROPHILS ABSOLUTE COUNT: 4 THOU/MM3 (ref 1.8–7.7)
SODIUM, WHOLE BLOOD: 140 MEQ/L (ref 138–146)
TOTAL CO2, WHOLE BLOOD: 27 MEQ/L (ref 23–33)
TOTAL PROTEIN: 6.6 G/DL (ref 6.1–8)
WBC # BLD: 7.2 THOU/MM3 (ref 4.8–10.8)

## 2019-05-01 PROCEDURE — 36415 COLL VENOUS BLD VENIPUNCTURE: CPT

## 2019-05-01 PROCEDURE — 99211 OFF/OP EST MAY X REQ PHY/QHP: CPT

## 2019-05-01 PROCEDURE — 85027 COMPLETE CBC AUTOMATED: CPT

## 2019-05-01 PROCEDURE — 96372 THER/PROPH/DIAG INJ SC/IM: CPT

## 2019-05-01 PROCEDURE — 99215 OFFICE O/P EST HI 40 MIN: CPT | Performed by: INTERNAL MEDICINE

## 2019-05-01 PROCEDURE — G8598 ASA/ANTIPLAT THER USED: HCPCS | Performed by: INTERNAL MEDICINE

## 2019-05-01 PROCEDURE — G8427 DOCREV CUR MEDS BY ELIG CLIN: HCPCS | Performed by: INTERNAL MEDICINE

## 2019-05-01 PROCEDURE — 6360000002 HC RX W HCPCS: Performed by: INTERNAL MEDICINE

## 2019-05-01 PROCEDURE — 4040F PNEUMOC VAC/ADMIN/RCVD: CPT | Performed by: INTERNAL MEDICINE

## 2019-05-01 PROCEDURE — 1036F TOBACCO NON-USER: CPT | Performed by: INTERNAL MEDICINE

## 2019-05-01 PROCEDURE — 1123F ACP DISCUSS/DSCN MKR DOCD: CPT | Performed by: INTERNAL MEDICINE

## 2019-05-01 PROCEDURE — G8417 CALC BMI ABV UP PARAM F/U: HCPCS | Performed by: INTERNAL MEDICINE

## 2019-05-01 PROCEDURE — 80076 HEPATIC FUNCTION PANEL: CPT

## 2019-05-01 PROCEDURE — 80047 BASIC METABLC PNL IONIZED CA: CPT

## 2019-05-01 PROCEDURE — G0463 HOSPITAL OUTPT CLINIC VISIT: HCPCS

## 2019-05-01 RX ORDER — DIPHENHYDRAMINE HYDROCHLORIDE 50 MG/ML
50 INJECTION INTRAMUSCULAR; INTRAVENOUS ONCE
Status: CANCELLED | OUTPATIENT
Start: 2019-05-29

## 2019-05-01 RX ORDER — METHYLPREDNISOLONE SODIUM SUCCINATE 125 MG/2ML
125 INJECTION, POWDER, LYOPHILIZED, FOR SOLUTION INTRAMUSCULAR; INTRAVENOUS ONCE
Status: CANCELLED | OUTPATIENT
Start: 2019-05-29

## 2019-05-01 RX ORDER — HEPARIN SODIUM (PORCINE) LOCK FLUSH IV SOLN 100 UNIT/ML 100 UNIT/ML
500 SOLUTION INTRAVENOUS PRN
Status: CANCELLED | OUTPATIENT
Start: 2019-05-29

## 2019-05-01 RX ORDER — SODIUM CHLORIDE 9 MG/ML
INJECTION, SOLUTION INTRAVENOUS CONTINUOUS
Status: CANCELLED | OUTPATIENT
Start: 2019-06-26

## 2019-05-01 RX ORDER — SODIUM CHLORIDE 0.9 % (FLUSH) 0.9 %
10 SYRINGE (ML) INJECTION PRN
Status: CANCELLED | OUTPATIENT
Start: 2019-05-29

## 2019-05-01 RX ORDER — 0.9 % SODIUM CHLORIDE 0.9 %
10 VIAL (ML) INJECTION ONCE
Status: CANCELLED | OUTPATIENT
Start: 2019-06-26

## 2019-05-01 RX ORDER — SODIUM CHLORIDE 9 MG/ML
INJECTION, SOLUTION INTRAVENOUS CONTINUOUS
Status: CANCELLED | OUTPATIENT
Start: 2019-05-01

## 2019-05-01 RX ORDER — METHYLPREDNISOLONE SODIUM SUCCINATE 125 MG/2ML
125 INJECTION, POWDER, LYOPHILIZED, FOR SOLUTION INTRAMUSCULAR; INTRAVENOUS ONCE
Status: CANCELLED | OUTPATIENT
Start: 2019-05-01

## 2019-05-01 RX ORDER — DIPHENHYDRAMINE HYDROCHLORIDE 50 MG/ML
50 INJECTION INTRAMUSCULAR; INTRAVENOUS ONCE
Status: CANCELLED | OUTPATIENT
Start: 2019-06-26

## 2019-05-01 RX ORDER — SODIUM CHLORIDE 9 MG/ML
INJECTION, SOLUTION INTRAVENOUS CONTINUOUS
Status: CANCELLED | OUTPATIENT
Start: 2019-05-29

## 2019-05-01 RX ORDER — METHYLPREDNISOLONE SODIUM SUCCINATE 125 MG/2ML
125 INJECTION, POWDER, LYOPHILIZED, FOR SOLUTION INTRAMUSCULAR; INTRAVENOUS ONCE
Status: CANCELLED | OUTPATIENT
Start: 2019-06-26

## 2019-05-01 RX ORDER — 0.9 % SODIUM CHLORIDE 0.9 %
10 VIAL (ML) INJECTION ONCE
Status: CANCELLED | OUTPATIENT
Start: 2019-05-01

## 2019-05-01 RX ORDER — 0.9 % SODIUM CHLORIDE 0.9 %
10 VIAL (ML) INJECTION ONCE
Status: CANCELLED | OUTPATIENT
Start: 2019-05-29

## 2019-05-01 RX ORDER — HEPARIN SODIUM (PORCINE) LOCK FLUSH IV SOLN 100 UNIT/ML 100 UNIT/ML
500 SOLUTION INTRAVENOUS PRN
Status: CANCELLED | OUTPATIENT
Start: 2019-06-26

## 2019-05-01 RX ORDER — SODIUM CHLORIDE 0.9 % (FLUSH) 0.9 %
10 SYRINGE (ML) INJECTION PRN
Status: CANCELLED | OUTPATIENT
Start: 2019-05-01

## 2019-05-01 RX ORDER — DIPHENHYDRAMINE HYDROCHLORIDE 50 MG/ML
50 INJECTION INTRAMUSCULAR; INTRAVENOUS ONCE
Status: CANCELLED | OUTPATIENT
Start: 2019-05-01

## 2019-05-01 RX ORDER — HEPARIN SODIUM (PORCINE) LOCK FLUSH IV SOLN 100 UNIT/ML 100 UNIT/ML
500 SOLUTION INTRAVENOUS PRN
Status: CANCELLED | OUTPATIENT
Start: 2019-05-01

## 2019-05-01 RX ORDER — SODIUM CHLORIDE 0.9 % (FLUSH) 0.9 %
10 SYRINGE (ML) INJECTION PRN
Status: CANCELLED | OUTPATIENT
Start: 2019-06-26

## 2019-05-01 RX ADMIN — OCTREOTIDE ACETATE 30 MG: KIT at 11:35

## 2019-05-01 NOTE — PATIENT INSTRUCTIONS
1.  Octreotide today. 2.  Octreotide and labs on 05/29/2019 and 06/26/2019.  3.  CT scans prior to RTC. 4.  Labs on RTC. 5.  Ocreotide on RTC.

## 2019-05-01 NOTE — PROGRESS NOTES
[facility] CANCER CENTER  CANCER NETWORK OF [facility]  [facility] [address] [facility]  [address], [place] [facility] [address]  [address]  Dept: [phone]  Dept Fax: [phone]  Loc: [phone]    Subjective:      Chief Complaint: Niya Mock is a 80 y.o. male. The patient has been treated for malignant carcinoid syndrome. He was diagnosed in December of 2012 when he presented with abdominal pain. An abdominal CAT scan demonstrated intra-abdominal adenopathy and disease in the liver. He underwent a liver biopsy on November 11, 2012 which confirmed carcinoid tumor. An octreotide scan confirmed disease in the liver and abdominal lymph nodes. At the same time there was a mass noted in the left kidney that was concerning for a second primary malignancy. A CT directed biopsy was canceled by the radiologist and before an open biopsy could be scheduled, and the patient experienced a myocardial infarction. He had three stents placed and takes anti-platelet medication routinely. He subsequently developed problems with urinary retention and was found to have prostatic hypertrophy. He had a PSA of 7. He was advised that he was a candidate for a TURP but could not have this procedure done because of the myocardial infarction. A follow up abdominal CAT scan was completed on October 9, 2013. This demonstrated an increase in the size of lesions in the liver, abdominal lymph nodes and kidney. 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 Maria Eugenia Barnes developed an increase of right upper quadrant and mid abdominal pain in March, 2014. He was also having increasing episodes of diarrhea.  He was referred for an abdominal CAT scan completed on 3/11/2014 that demonstrated enlargement of all areas of hepatic metastasis compared to a CAT scan obtained on 10/9/2013. There was noted to be stable BPH. There was an area of \"inflammatory reaction\" extending off the tail of the pancreas along the inferior aspect of the spleen. The tail of the pancreas revealed minimal edema. The possibility of pancreatitis was suggested. There was noted to be change in the kidney interpreted as pyelonephritis. However the radiologist did not make mention of the recent partial nephrectomy which could also be the explanation for the other described findings. He was started on Octreotide on 03/12/2014. In 2018, the patient underwent two TACE sutures at [facility]. HPI:  The patient is here for follow up examination. He is here for his history of carcinoid malignancy. Caryle Deer has had previous symptoms of abdominal pain but these have improved recently. The patient's overall condition remains stable. He has no signs or symptom to suggest progression of malignancy. The patient has been in overall good health. He has no complaints of skeletal pain. The patient denies shortness of breath, chest pain, a change in bowel habits or a change in bladder habits. He does complain of fatigue. ECOG performance status is level 1. PMH, SH, and FH:  I reviewed the PMH, SH and FH as noted on the electronic medical record. There have been no changes as noted in the previous documentation. Review of Systems   Constitutional: Fatigue. HENT: Negative. Eyes: Negative. Respiratory: Negative. Cardiovascular: Negative. Gastrointestinal: Abdominal pain. Genitourinary: Negative. Musculoskeletal: Negative. Skin: Negative. Neurological: Negative. Hematological: Negative. Psychiatric/Behavioral: Negative. Objective:   Physical Exam   Vitals:    05/01/19 0958   BP: (!) 104/58   Pulse: 69   Resp: 16   Temp: 97.7 °F (36.5 °C)   SpO2: 100%   Vitals reviewed and are stable. Constitutional: Elderly, frail. No acute distress. HENT: Normocephalic and atraumatic. Eyes: Pupils are equal and reactive. No scleral icterus. Neck: Overall appearance is symmetrical. No identifiable masses. Chest: Inspection and palpation of chest is normal.  Pulmonary: Effort normal. No respiratory distress. Cardiovascular: Regular rate and rhythm normal S1 and S2. Abdominal: Soft. No hepatomegaly or splenomegaly. Musculoskeletal: Gait is abnormal. Muscle strength and tone decreased. Neurological: Alert and oriented to person, place, and time. Judgment and thought content normal.  Skin: Scattered ecchymosis noted on bilateral upper and lower extremities. Psychiatric: Mood and affect appropriate for the clinical situation. Behavior is normal.     Data Analysis:    Hematology 5/1/2019 4/3/2019 3/6/2019   WBC 7.2 8.1 8.3   RBC 4.46 (L) 4.48 (L) 4.62 (L)   HGB 12.7 (L) 12.4 (L) 12.6 (L)   HCT 38.7 (L) 38.7 (L) 39.1 (L)   MCV 87 86 85   RDW 14.3 15.5 (H) 13.6    197 350     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 2. Renal cell carcinoma. 3. Therapy with Octreotide. 4. Chronic anemia. Plan:   1. Continue therapy with Octreotide monthly. 2.  Monitor for progression of malignancy. 3.  Monitor for side effects and toxicity from Octreotide. 4.  Monitor hemoglobin/hematocrit and for any signs of blood loss. 5.  CT scans prior to RTC. Hiwot Thomas M.D. Medical Director: [facility]  Cancer [facility]  [address] [employer] [facility], [address] [place] [address], [address] [place] [address], [address] [place] [address], [address] [place] [address] of the [facility] at [facility]          .

## 2019-05-23 ENCOUNTER — TELEPHONE (OUTPATIENT)
Dept: UROLOGY | Age: 84
End: 2019-05-23

## 2019-05-23 ENCOUNTER — OFFICE VISIT (OUTPATIENT)
Dept: UROLOGY | Age: 84
End: 2019-05-23
Payer: MEDICARE

## 2019-05-23 VITALS
BODY MASS INDEX: 23.7 KG/M2 | SYSTOLIC BLOOD PRESSURE: 118 MMHG | HEIGHT: 69 IN | DIASTOLIC BLOOD PRESSURE: 64 MMHG | WEIGHT: 160 LBS

## 2019-05-23 DIAGNOSIS — N13.8 BPH WITH OBSTRUCTION/LOWER URINARY TRACT SYMPTOMS: Primary | ICD-10-CM

## 2019-05-23 DIAGNOSIS — Z01.818 PRE-OP TESTING: ICD-10-CM

## 2019-05-23 DIAGNOSIS — N40.1 BPH WITH OBSTRUCTION/LOWER URINARY TRACT SYMPTOMS: Primary | ICD-10-CM

## 2019-05-23 PROCEDURE — G8598 ASA/ANTIPLAT THER USED: HCPCS | Performed by: UROLOGY

## 2019-05-23 PROCEDURE — 99214 OFFICE O/P EST MOD 30 MIN: CPT | Performed by: UROLOGY

## 2019-05-23 PROCEDURE — G8420 CALC BMI NORM PARAMETERS: HCPCS | Performed by: UROLOGY

## 2019-05-23 PROCEDURE — 1036F TOBACCO NON-USER: CPT | Performed by: UROLOGY

## 2019-05-23 PROCEDURE — 1123F ACP DISCUSS/DSCN MKR DOCD: CPT | Performed by: UROLOGY

## 2019-05-23 PROCEDURE — G8427 DOCREV CUR MEDS BY ELIG CLIN: HCPCS | Performed by: UROLOGY

## 2019-05-23 PROCEDURE — 4040F PNEUMOC VAC/ADMIN/RCVD: CPT | Performed by: UROLOGY

## 2019-05-23 ASSESSMENT — ENCOUNTER SYMPTOMS
CONSTIPATION: 0
SHORTNESS OF BREATH: 0
DIARRHEA: 0
CHEST TIGHTNESS: 0

## 2019-05-23 NOTE — PROGRESS NOTES
Subjective:      Patient ID: Negin Frias 80 y.o. male 9/8/1932    Chief Complaint   Patient presents with    Dysuria     wants to discuss TURP        Benign Prostatic Hypertrophy   This is a chronic problem. The current episode started more than 1 year ago. The problem has been gradually worsening since onset. Irritative symptoms do not include frequency or urgency. Associated symptoms include dysuria. Pertinent negatives include no chills or hematuria. AUA score is 8-19. His sexual activity is non-contributory to the current illness. Nothing aggravates the symptoms. Past treatments include nothing. The treatment provided no relief.        Past Medical History:   Diagnosis Date    Cancer [facility])     skin    Carcinoid tumor     Coronary artery disease 10/27/2013    [facility] ([facility]     Heart disease     History of stomach ulcers     Hyperlipidemia     Hypertension     Osteoarthritis     [facility]al cancer ([facility] 7/8/2014       Social History     Socioeconomic History    Marital status:      Spouse name: Not on file    Number of children: Not on file    Years of education: Not on file    Highest education level: Not on file   Occupational History    Not on file   Social Needs    Financial resource strain: Not on file    Food insecurity:     Worry: Not on file     Inability: Not on file    Transportation needs:     Medical: Not on file     Non-medical: Not on file   Tobacco Use    Smoking status: Never Smoker    Smokeless tobacco: Never Used   Substance and Sexual Activity    Alcohol use: No    Drug use: No    Sexual activity: Not on file   Lifestyle    Physical activity:     Days per week: Not on file     Minutes per session: Not on file    Stress: Not on file   Relationships    Social connections:     Talks on phone: Not on file     Gets together: Not on file     Attends [employer] service: Not on file     Active member of club or organization: Not on file     Attends meetings of clubs or organizations: Not on file     Relationship status: Not on file    Intimate partner violence:     Fear of current or ex partner: Not on file     Emotionally abused: Not on file     Physically abused: Not on file     Forced sexual activity: Not on file   Other Topics Concern    Not on file   Social History Narrative    Not on file       Family History   Problem Relation Age of Onset    Heart Disease Father        Past Surgical History:   Procedure Laterality Date    BACK SURGERY      X2   [address] 43    open heart 5 bypass    CATARACT REMOVAL      CORONARY ANGIOPLASTY WITH STENT PLACEMENT      2     EYE SURGERY      JOINT REPLACEMENT  2010    left knee    NECK SURGERY      OTHER SURGICAL HISTORY  07/2017    Stem Cell    OTHER SURGICAL HISTORY  02/28/2018    Tace    PARTIAL NEPHRECTOMY Left 12/18/13    Left partial nephrectomy laparoscopic robotic assisted - Dr. Kary Martinez       Allergies   Allergen Reactions    Altace [Ramipril]      cough    Avodart [Dutasteride]     Cardura [Doxazosin]     Cotrim [Sulfamethoxazole-Trimethoprim]     Fenofibrate      Bleeding stomach    Finasteride Other (See Comments)    Hytrin [Terazosin]     Iron      Blurred vison    Naproxen     Pravachol [Pravastatin]     Procalamine     Yohimbine          Current Outpatient Medications:     amiodarone (PACERONE) 100 MG tablet, Take 100 mg by mouth daily, Disp: , Rfl:     amLODIPine (NORVASC) 5 MG tablet, Take 5 mg by mouth daily, Disp: , Rfl:     Turmeric (CURCUMIN 95) 500 MG CAPS, Take 500 mg by mouth daily, Disp: , Rfl:     metoprolol succinate (TOPROL XL) 25 MG extended release tablet, Take 25 mg by mouth daily Patient takes 1/2 tablet a day, Disp: , Rfl:     clopidogrel (PLAVIX) 75 MG tablet, Take 75 mg by mouth daily. , Disp: , Rfl:     Octreotide Acetate (SANDOSTATIN LAR DEPOT IM), Inject  into the muscle every 28 days. , Disp: , Rfl:    aspirin 81 MG tablet, Take 81 mg by mouth daily. , Disp: , Rfl:     atorvastatin (LIPITOR) 40 MG tablet, Take 20 mg by mouth daily , Disp: , Rfl:     Coenzyme Q10 (CO Q-10) 100 MG CAPS, Take  by mouth., Disp: , Rfl:     Saw New Era 450 MG CAPS, Take 450 mg by mouth 2 times daily. , Disp: , Rfl:     Review of Systems   Constitutional: Negative for chills and fatigue. HENT: Negative for hearing loss, mouth sores and nosebleeds. Respiratory: Negative for chest tightness and shortness of breath. Cardiovascular: Negative for chest pain and palpitations. Gastrointestinal: Negative for constipation and diarrhea. Genitourinary: Positive for difficulty urinating and dysuria. Negative for frequency, hematuria and urgency. Musculoskeletal: Negative for joint swelling. Skin: Negative for rash and wound. Neurological: Negative for dizziness, seizures, light-headedness and headaches. Hematological: Does not bruise/bleed easily. /64   Ht 5' 9\" (1.753 m)   Wt 160 lb (72.6 kg)   BMI 23.63 kg/m²     Objective:   Physical Exam   Constitutional: He is oriented to person, place, and time. No distress. HENT:   Head: Normocephalic and atraumatic. Eyes: Pupils are equal, round, and reactive to light. EOM are normal. No scleral icterus. Neck: No tracheal deviation present. Cardiovascular: Normal rate and regular rhythm. Pulmonary/Chest: Effort normal. No stridor. Abdominal: Soft. There is no tenderness. There is no rebound. Musculoskeletal: Normal range of motion. He exhibits no edema. Neurological: He is oriented to person, place, and time. No cranial nerve deficit. Skin: Skin is warm and dry. He is not diaphoretic. Labs    No results found for this visit on 05/23/19.     Lab Results   Component Value Date    CREATININE 1.0 05/01/2019    BUN 11 11/19/2018     05/01/2019    K 4.2 05/01/2019     11/19/2018    CO2 26 11/19/2018     Previous PVR: 143 mL    Assessment: Diagnosis Orders   1. BPH with obstruction/lower urinary tract symptoms  Urinalysis Reflex to Culture   2. Pre-op testing  Urinalysis Reflex to Culture       Mr. Ventura Tanner presents today in follow-up for BPH with obstruction/lower urinary tract symptoms [N40.1, N13.8]. He has been waiting to be cleared for a TURP. He is now clear and would like to proceed. We have discussed this at length in the past and we discussed that additionally today. We discussed the procedure. We discussed that he would be in the hospital overnight with continuous bladder irrigation. He is interested in proceeding. I have reviewed all notes sent along with this referral including notes from a recent visit to his primary care provider. These records demonstrated the following past medical history:  Past Medical History:   Diagnosis Date    Cancer [facility])     skin    Carcinoid tumor     Coronary artery disease 10/27/2013    [facility] ([facility]     Heart disease     History of stomach ulcers     Hyperlipidemia     Hypertension     Osteoarthritis     Renal cancer ([facility] [address] 7/8/2014          Plan:        Schedule TURP- needs clearance. I described the procedure in detail and also described the associated risks and benefits at length. We discussed possible alternative therapies. We discussed the risks and benefits of not undergoing therapy. Keke Edward understands these risks and benefits and desires to proceed. He will be scheduled for the procedure in the very near future.

## 2019-05-23 NOTE — TELEPHONE ENCOUNTER
MEDICAL CLEARANCE FORM  Clearance From: Dr. Sree Juarez   Appointment Date  7/1/19 Time   12:45 pm    Maisha Welch  9/8/1932  Surgeon:  Dr. Boris Bates    Procedure:  Transurethral resection of prostate  Date:  7/2/19  Facility: [address] [place] [address] HISTORY  DM CAD PVD CVA DVT/PE MI CHFMalignant Hyperthemia HTN Tobacco/ETOH Sleep Apnea GERD Hyperlipidemia Renal Insufficiency COPD/Asthma Bleeding Disorder Pacemaker/AICD  II. CURRENT MEDICATIONS: Attach list or complete     Pt is on following meds that need special instructions for surgery:  Anticoagulants Heart Meds ASA Insulin Oral anti-diabetics NSAIDS Diuretics     K replacements  III. ALLERGIES:   IV.  FUNCTIONAL CAPACITY  >4 METS (CAN VACUUM/HOUSEWORK,CLIMB FLIGHT STAIRS WITHOUT DYSPNEA)  <4METS (FLIGHT OF STEPS CAUSES DYSPNEA/CARDIAC SYMPTOMS)   Stress Test Recommended:    Stress tests or Cardiac Cath in last 5 years:  Yes (attach report)  No   Results: WNL   ABN  Any Change in Cardiac symptoms: Yes  NO  Comments:    Revascularization in last 5 years: Yes  NO  CABG: Yes  No   Comments:     Stents:  Date: ________  Any change in cardiac symptoms  Yes  NO  Comments:   V.  REVIEW OF SYSTEMS:  (Pertinent positive or negative)    VI. PHYSICIAL EXAM  HEENT:1.  Dentations   Good Poor          HT:_______ WT:______      2. Neck Pathology: Rheumatoid DDD C-spine  BP:______ P:________  PULMONARY:  CARDIAC  ABDOMEN  EXTREMITIES  OTHER  VII.   Testing Ordered by Surgeon  Reviewed by Clearance Physician  Test      Result  Plan,if Abnormal  ___CBS     WNL  ABN____________________  ___BMP/BUN/CR    WNL  ABN____________________  ___K+      WNL  ABN____________________  ___UA      WNL  ABN____________________  ___CXR     WNL  ABN____________________  ___EKG     WNL  ABN____________________  ___MRSA     WNL  ABN____________________  VIII.  ___Acceptable risk for surgery ___Risk Unacceptable-Communication to Follow Comments:_____________________________________________________  ________________________________________________________________________  Physician ___________________________  Date:_______________  Physician Printed Name:  _________________________    [facility]  [phone]

## 2019-05-23 NOTE — TELEPHONE ENCOUNTER
Patient is scheduled for surgery on 7/2/19. Arrival time to be determined. Surgery consent signed. All pre op testing done. Patient will need clearance from Dr. Harry Leyva and Dr. Vivienne Charles. Appointment with Dr Harry Leyva 7/1/19 at 12:45 pm. Uranalysis to be done 1 week prior.

## 2019-05-23 NOTE — TELEPHONE ENCOUNTER
CARDIAC CLEARANCE FORM  Clearance From  Dr Idalia Belcher   Appointment Date   Time       Jazmine Ellsworth  9/8/1932  Surgeon:  Dr Lonny Walsh    Procedure:  Transurethral resection of prostate  Date:    Facility:     I.  MEDICAL HISTORY  DM CAD PVD CVA DVT/PE MI CHFMalignant Hyperthemia HTN Tobacco/ETOH Sleep Apnea GERD Hyperlipidemia Renal Insufficiency COPD/Asthma Bleeding Disorder Pacemaker/AICD  II. CURRENT MEDICATIONS: Attach list or complete     Pt is on following meds that need special instructions for surgery:  Anticoagulants Heart Meds ASA Insulin Oral anti-diabetics NSAIDS Diuretics     K replacements  III. ALLERGIES:   IV.  FUNCTIONAL CAPACITY  >4 METS (CAN VACUUM/HOUSEWORK,CLIMB FLIGHT STAIRS WITHOUT DYSPNEA)  <4METS (FLIGHT OF STEPS CAUSES DYSPNEA/CARDIAC SYMPTOMS)   Stress Test Recommended:    Stress tests or Cardiac Cath in last 5 years:  Yes (attach report)  No   Results: WNL   ABN  Any Change in Cardiac symptoms: Yes  NO  Comments:    Revascularization in last 5 years: Yes  NO  CABG: Yes  No   Comments:     Stents:  Date: ________  Any change in cardiac symptoms  Yes  NO  Comments:   V.  REVIEW OF SYSTEMS:  (Pertinent positive or negative)    VI. PHYSICIAL EXAM  HEENT:1.  Dentations   Good Poor          HT:_______ WT:______      2. Neck Pathology: Rheumatoid DDD C-spine  BP:______ P:________  PULMONARY:  CARDIAC  ABDOMEN  EXTREMITIES  OTHER  VII.   Testing Ordered by Surgeon  Reviewed by Clearance Physician  Test      Result  Plan,if Abnormal  ___CBS     WNL  ABN____________________  ___BMP/BUN/CR    WNL  ABN____________________  ___K+      WNL  ABN____________________  ___UA      WNL  ABN____________________  ___CXR     WNL  ABN____________________  ___EKG     WNL  ABN____________________  ___MRSA     WNL  ABN____________________  VIII.  ___Acceptable risk for surgery ___Risk Unacceptable-Communication to Follow

## 2019-05-29 ENCOUNTER — HOSPITAL ENCOUNTER (OUTPATIENT)
Dept: INFUSION THERAPY | Age: 84
Discharge: HOME | End: 2019-05-29
Payer: MEDICARE

## 2019-05-29 VITALS
HEART RATE: 57 BPM | OXYGEN SATURATION: 98 % | HEIGHT: 69 IN | WEIGHT: 168.4 LBS | BODY MASS INDEX: 24.94 KG/M2 | DIASTOLIC BLOOD PRESSURE: 55 MMHG | RESPIRATION RATE: 16 BRPM | SYSTOLIC BLOOD PRESSURE: 113 MMHG | TEMPERATURE: 98.6 F

## 2019-05-29 DIAGNOSIS — D3A.00 CARCINOID TUMOR: Primary | ICD-10-CM

## 2019-05-29 LAB
ALBUMIN SERPL-MCNC: 3.6 G/DL (ref 3.5–5.1)
ALP BLD-CCNC: 217 U/L (ref 38–126)
ALT SERPL-CCNC: 16 U/L (ref 11–66)
AST SERPL-CCNC: 14 U/L (ref 5–40)
BILIRUB SERPL-MCNC: 0.3 MG/DL (ref 0.3–1.2)
BILIRUBIN DIRECT: < 0.2 MG/DL (ref 0–0.3)
BUN, WHOLE BLOOD: 13 MG/DL (ref 8–26)
CHLORIDE, WHOLE BLOOD: 99 MEQ/L (ref 98–109)
CREATININE, WHOLE BLOOD: 1 MG/DL (ref 0.5–1.2)
ERYTHROCYTE [DISTWIDTH] IN BLOOD BY AUTOMATED COUNT: 15.3 % (ref 11.5–14.5)
ERYTHROCYTE [DISTWIDTH] IN BLOOD BY AUTOMATED COUNT: 49.3 FL (ref 35–45)
GFR, ESTIMATED ,CON: 75 ML/MIN/1.73M2
GLUCOSE, WHOLE BLOOD: 92 MG/DL (ref 70–108)
HCT VFR BLD CALC: 39.2 % (ref 42–52)
HEMOGLOBIN: 12.6 GM/DL (ref 14–18)
IONIZED CALCIUM, WHOLE BLOOD: 1.14 MMOL/L (ref 1.12–1.32)
MCH RBC QN AUTO: 28.6 PG (ref 26–33)
MCHC RBC AUTO-ENTMCNC: 32.1 GM/DL (ref 32.2–35.5)
MCV RBC AUTO: 88.9 FL (ref 80–94)
PLATELET # BLD: 233 THOU/MM3 (ref 130–400)
PMV BLD AUTO: 11.8 FL (ref 9.4–12.4)
POTASSIUM, WHOLE BLOOD: 4.2 MEQ/L (ref 3.5–4.9)
RBC # BLD: 4.41 MILL/MM3 (ref 4.7–6.1)
SEGMENTED NEUTROPHILS ABSOLUTE COUNT: 4.6 THOU/MM3 (ref 1.8–7.7)
SODIUM, WHOLE BLOOD: 138 MEQ/L (ref 138–146)
TOTAL CO2, WHOLE BLOOD: 27 MEQ/L (ref 23–33)
TOTAL PROTEIN: 7 G/DL (ref 6.1–8)
WBC # BLD: 8.1 THOU/MM3 (ref 4.8–10.8)

## 2019-05-29 PROCEDURE — 36415 COLL VENOUS BLD VENIPUNCTURE: CPT

## 2019-05-29 PROCEDURE — 6360000002 HC RX W HCPCS: Performed by: INTERNAL MEDICINE

## 2019-05-29 PROCEDURE — 80076 HEPATIC FUNCTION PANEL: CPT

## 2019-05-29 PROCEDURE — 80047 BASIC METABLC PNL IONIZED CA: CPT

## 2019-05-29 PROCEDURE — 85027 COMPLETE CBC AUTOMATED: CPT

## 2019-05-29 PROCEDURE — 96372 THER/PROPH/DIAG INJ SC/IM: CPT

## 2019-05-29 RX ADMIN — OCTREOTIDE ACETATE 30 MG: KIT at 10:34

## 2019-05-29 NOTE — PLAN OF CARE
Problem: Musculor/Skeletal Functional Status  Goal: Absence of falls  5/29/2019 1553 by Romy Crowley RN  Outcome: Met This Shift  Note:   Patient verbalizes understanding of fall precautions. Patient free from falls this visit. 5/29/2019 1251 by Romy Crowley RN  Outcome: Met This Shift  Intervention: Fall precautions  Note:   Discussed fall precautions, call light within reach. Problem: Intellectual/Education/Knowledge Deficit  Goal: Teaching initiated upon admission  5/29/2019 1553 by Romy Crowley RN  Outcome: Met This Shift  Note:   Patient verbalizes understanding to verbal information given on sandostatin,action and possible side effects. Aware to call MD if develop complications. 5/29/2019 1251 by Romy Crowley RN  Outcome: Met This Shift  Intervention: Verbal/written education provided  Note:   Discussed sandostatin action, possible side effects and when to call the doctor. Problem: Discharge Planning  Goal: Knowledge of discharge instructions  Description  Knowledge of discharge instructions     5/29/2019 1553 by Romy Crowley RN  Outcome: Met This Shift  Note:   Verbalized understanding of discharge instructions, follow-up appointments, and when to call the physician.  5/29/2019 1251 by Romy Crowley RN  Outcome: Met This Shift  Intervention: Discharge to appropriate level of care  Note:   Discuss discharge instructions, follow ups, and when to call the doctor. Care plan reviewed with patient. Patient verbalize understanding of the plan of care and contribute to goal setting.

## 2019-06-07 ENCOUNTER — SURG/PROC ORDERS (OUTPATIENT)
Dept: UROLOGY | Age: 84
End: 2019-06-07

## 2019-06-07 RX ORDER — SODIUM CHLORIDE 9 MG/ML
INJECTION, SOLUTION INTRAVENOUS CONTINUOUS
Status: CANCELLED | OUTPATIENT
Start: 2019-07-02

## 2019-06-17 ENCOUNTER — TELEPHONE (OUTPATIENT)
Dept: UROLOGY | Age: 84
End: 2019-06-17

## 2019-06-24 LAB
BILIRUBIN URINE: NORMAL MG/DL
BLOOD, URINE: NEGATIVE
CLARITY: CLEAR
COLOR: YELLOW
GLUCOSE URINE: NEGATIVE
KETONES, URINE: NEGATIVE
LEUKOCYTE ESTERASE, URINE: NEGATIVE
NITRITE, URINE: NEGATIVE
PH UA: 6 (ref 4.5–8)
PROTEIN UA: NEGATIVE
SPECIFIC GRAVITY UA: 1.02 (ref 1–1.03)
UROBILINOGEN, URINE: NORMAL

## 2019-06-25 ENCOUNTER — TELEPHONE (OUTPATIENT)
Dept: UROLOGY | Age: 84
End: 2019-06-25

## 2019-06-25 RX ORDER — ATORVASTATIN CALCIUM 20 MG/1
20 TABLET, FILM COATED ORAL NIGHTLY
COMMUNITY

## 2019-06-25 RX ORDER — GABAPENTIN 100 MG/1
100 CAPSULE ORAL NIGHTLY
COMMUNITY

## 2019-06-25 RX ORDER — BILBERRY FRUIT 1000 MG
CAPSULE ORAL 2 TIMES DAILY
COMMUNITY
End: 2019-10-09

## 2019-06-25 NOTE — PROGRESS NOTES
NPO after midnight  [employer] and drivers license  Wear comfortable clean clothing  Do not bring jewelry  Shower night before and morning of surgery with a liquid antibacterial soap  Bring  medications   Follow all instructions given by your physician   needed at discharge  Call [phone] for any questions    In preparation for their surgical procedure above patient was screened for Obstructive Sleep Apnea (TRUE) using the STOP-Bang Questionnaire by the [place] [address] department. This is a pre-surgical screening tool for patient safety and serves as a recommendation, this WILL NOT cause cancellation of surgery. STOP-Bang Questionnaire  * Do you currently see a pulmonologist?  No     If yes STOP, do not complete. Patient follows with DrSandy .    1. Do you snore loudly (able to be heard in the next room)? No    2. Do you often feel tired or sleepy during the daytime? No       3. Has anyone ever told you that you stop breathing during your sleep? No    4. Do you have or are you being treated for high blood pressure? Yes      5. BMI more than 35? BMI (Calculated): 24.7        No    6. Age over 48 years? 80 y.o. Yes    7. Neck Circumference greater than 17 inches for male or 16 inches for female? Measured           (visits only)            Not Applicable    8. Gender Male? Yes      TOTAL SCORE: 2    TRUE - Low Risk : Yes to 0 - 2 questions  TRUE - Intermediate Risk : Yes to 3 - 4 questions  TRUE - High Risk : Yes to 5 - 8 questions    Adapted from:   STOP Questionnaire: A Tool to Screen Patients for Obstructive Sleep Apnea   JEAN PIERRE WillardC.P.C., VILMA Mccoy.B.B.S., Dante Olivares M.D., April Bravo, Ph.D., VILMA Velez.B.B.S., Mari Tristan M.Sc., Mile Garcia M.D., Odelljavier Mccray. JEAN PIERRE TalaveraC.P.C.    Anesthesiology 2008; 921:115-77 Copyright 2008, the [employer] of [employer]esthesiologists, [address] [place] [address]

## 2019-06-25 NOTE — TELEPHONE ENCOUNTER
Patient notified of surgery arrival time. Patient is to be at [address] [place] [address] Drive day surgery by  10:15am   on  07-. Patient reminded to have nothing to eat or drink after midnight. Patient voiced understanding.

## 2019-06-26 ENCOUNTER — HOSPITAL ENCOUNTER (OUTPATIENT)
Dept: INFUSION THERAPY | Age: 84
Discharge: HOME | End: 2019-06-26
Payer: MEDICARE

## 2019-06-26 VITALS
HEART RATE: 57 BPM | RESPIRATION RATE: 18 BRPM | SYSTOLIC BLOOD PRESSURE: 111 MMHG | OXYGEN SATURATION: 98 % | BODY MASS INDEX: 25.8 KG/M2 | DIASTOLIC BLOOD PRESSURE: 57 MMHG | TEMPERATURE: 97.7 F | WEIGHT: 167.2 LBS

## 2019-06-26 DIAGNOSIS — D3A.00 CARCINOID TUMOR: Primary | ICD-10-CM

## 2019-06-26 LAB
ALBUMIN SERPL-MCNC: 3.7 G/DL (ref 3.5–5.1)
ALP BLD-CCNC: 247 U/L (ref 38–126)
ALT SERPL-CCNC: 15 U/L (ref 11–66)
AST SERPL-CCNC: 13 U/L (ref 5–40)
BILIRUB SERPL-MCNC: 0.3 MG/DL (ref 0.3–1.2)
BILIRUBIN DIRECT: < 0.2 MG/DL (ref 0–0.3)
BUN, WHOLE BLOOD: 15 MG/DL (ref 8–26)
CHLORIDE, WHOLE BLOOD: 99 MEQ/L (ref 98–109)
CREATININE, WHOLE BLOOD: 1 MG/DL (ref 0.5–1.2)
GFR, ESTIMATED ,CON: 75 ML/MIN/1.73M2
GLUCOSE, WHOLE BLOOD: 102 MG/DL (ref 70–108)
HCT VFR BLD CALC: 40 % (ref 42–52)
HEMOGLOBIN: 12.5 GM/DL (ref 14–18)
IONIZED CALCIUM, WHOLE BLOOD: 1.11 MMOL/L (ref 1.12–1.32)
MCH RBC QN AUTO: 26.9 PG (ref 27–31)
MCHC RBC AUTO-ENTMCNC: 31.1 GM/DL (ref 33–37)
MCV RBC AUTO: 86 FL (ref 80–94)
PDW BLD-RTO: 12.8 % (ref 11.5–14.5)
PLATELET # BLD: 350 THOU/MM3 (ref 130–400)
PMV BLD AUTO: 7.8 FL (ref 7.4–10.4)
POTASSIUM, WHOLE BLOOD: 4.5 MEQ/L (ref 3.5–4.9)
RBC # BLD: 4.63 MILL/MM3 (ref 4.7–6.1)
SEG NEUTROPHILS: 61 % (ref 43–75)
SEGMENTED NEUTROPHILS ABSOLUTE COUNT: 5.7 THOU/MM3 (ref 1.8–7.7)
SODIUM, WHOLE BLOOD: 138 MEQ/L (ref 138–146)
TOTAL CO2, WHOLE BLOOD: 29 MEQ/L (ref 23–33)
TOTAL PROTEIN: 6.9 G/DL (ref 6.1–8)
WBC # BLD: 9.3 THOU/MM3 (ref 4.8–10.8)

## 2019-06-26 PROCEDURE — 96372 THER/PROPH/DIAG INJ SC/IM: CPT

## 2019-06-26 PROCEDURE — 85027 COMPLETE CBC AUTOMATED: CPT

## 2019-06-26 PROCEDURE — 36415 COLL VENOUS BLD VENIPUNCTURE: CPT

## 2019-06-26 PROCEDURE — 80047 BASIC METABLC PNL IONIZED CA: CPT

## 2019-06-26 PROCEDURE — 6360000002 HC RX W HCPCS: Performed by: INTERNAL MEDICINE

## 2019-06-26 PROCEDURE — 80076 HEPATIC FUNCTION PANEL: CPT

## 2019-06-26 RX ADMIN — OCTREOTIDE ACETATE 30 MG: KIT at 10:17

## 2019-06-27 NOTE — H&P
diaphoretic.      Labs     No results found for this visit on 05/23/19.           Lab Results   Component Value Date     CREATININE 1.0 05/01/2019     BUN 11 11/19/2018      05/01/2019     K 4.2 05/01/2019      11/19/2018     CO2 26 11/19/2018      Previous PVR: 143 mL     Assessment:     Diagnosis Orders   1. BPH with obstruction/lower urinary tract symptoms  Urinalysis Reflex to Culture   2. Pre-op testing  Urinalysis Reflex to Culture         Mr. Jose A Soliman presents today in follow-up for BPH with obstruction/lower urinary tract symptoms [N40.1, N13.8].    He has been waiting to be cleared for a TURP. He is now clear and would like to proceed. We have discussed this at length in the past and we discussed that additionally today. We discussed the procedure. We discussed that he would be in the hospital overnight with continuous bladder irrigation.     He is interested in proceeding. I have reviewed all notes sent along with this referral including notes from a recent visit to his primary care provider. These records demonstrated the following past medical history:  Past Medical History        Past Medical History:   Diagnosis Date    Cancer [facility])       skin    Carcinoid tumor      Coronary artery disease 10/27/2013    Heart attack ([facility] [address]      Heart disease      History of stomach ulcers      Hyperlipidemia      Hypertension      Osteoarthritis      Renal cancer ([facility] [address] 7/8/2014               Plan:      Schedule TURP- needs clearance.      I described the procedure in detail and also described the associated risks and benefits at length. We discussed possible alternative therapies. We discussed the risks and benefits of not undergoing therapy. Rachel Armenta understands these risks and benefits and desires to proceed. He will be scheduled for the procedure in the very near future.

## 2019-07-02 ENCOUNTER — ANESTHESIA EVENT (OUTPATIENT)
Dept: OPERATING ROOM | Age: 84
End: 2019-07-02
Payer: MEDICARE

## 2019-07-02 ENCOUNTER — HOSPITAL ENCOUNTER (OUTPATIENT)
Age: 84
Discharge: HOME | End: 2019-07-04
Attending: UROLOGY | Admitting: UROLOGY
Payer: MEDICARE

## 2019-07-02 ENCOUNTER — ANESTHESIA (OUTPATIENT)
Dept: OPERATING ROOM | Age: 84
End: 2019-07-02
Payer: MEDICARE

## 2019-07-02 VITALS
TEMPERATURE: 97.3 F | RESPIRATION RATE: 1 BRPM | SYSTOLIC BLOOD PRESSURE: 119 MMHG | DIASTOLIC BLOOD PRESSURE: 56 MMHG | OXYGEN SATURATION: 100 %

## 2019-07-02 PROCEDURE — 2709999900 HC NON-CHARGEABLE SUPPLY

## 2019-07-02 PROCEDURE — 3700000000 HC ANESTHESIA ATTENDED CARE: Performed by: UROLOGY

## 2019-07-02 PROCEDURE — 88305 TISSUE EXAM BY PATHOLOGIST: CPT

## 2019-07-02 PROCEDURE — 3600000003 HC SURGERY LEVEL 3 BASE: Performed by: UROLOGY

## 2019-07-02 PROCEDURE — 6370000000 HC RX 637 (ALT 250 FOR IP): Performed by: NURSE PRACTITIONER

## 2019-07-02 PROCEDURE — 2580000003 HC RX 258: Performed by: UROLOGY

## 2019-07-02 PROCEDURE — 6360000002 HC RX W HCPCS: Performed by: NURSE PRACTITIONER

## 2019-07-02 PROCEDURE — 2580000003 HC RX 258: Performed by: NURSE PRACTITIONER

## 2019-07-02 PROCEDURE — 2720000010 HC SURG SUPPLY STERILE: Performed by: UROLOGY

## 2019-07-02 PROCEDURE — 52601 PROSTATECTOMY (TURP): CPT | Performed by: UROLOGY

## 2019-07-02 PROCEDURE — 3700000001 HC ADD 15 MINUTES (ANESTHESIA): Performed by: UROLOGY

## 2019-07-02 PROCEDURE — 2709999900 HC NON-CHARGEABLE SUPPLY: Performed by: UROLOGY

## 2019-07-02 PROCEDURE — 7100000011 HC PHASE II RECOVERY - ADDTL 15 MIN: Performed by: UROLOGY

## 2019-07-02 PROCEDURE — 7100000001 HC PACU RECOVERY - ADDTL 15 MIN: Performed by: UROLOGY

## 2019-07-02 PROCEDURE — 6360000002 HC RX W HCPCS: Performed by: NURSE ANESTHETIST, CERTIFIED REGISTERED

## 2019-07-02 PROCEDURE — 3600000013 HC SURGERY LEVEL 3 ADDTL 15MIN: Performed by: UROLOGY

## 2019-07-02 PROCEDURE — 6360000002 HC RX W HCPCS: Performed by: UROLOGY

## 2019-07-02 PROCEDURE — 7100000010 HC PHASE II RECOVERY - FIRST 15 MIN: Performed by: UROLOGY

## 2019-07-02 PROCEDURE — 2500000003 HC RX 250 WO HCPCS: Performed by: NURSE ANESTHETIST, CERTIFIED REGISTERED

## 2019-07-02 PROCEDURE — 7100000000 HC PACU RECOVERY - FIRST 15 MIN: Performed by: UROLOGY

## 2019-07-02 RX ORDER — ATORVASTATIN CALCIUM 20 MG/1
20 TABLET, FILM COATED ORAL NIGHTLY
Status: DISCONTINUED | OUTPATIENT
Start: 2019-07-02 | End: 2019-07-04 | Stop reason: HOSPADM

## 2019-07-02 RX ORDER — MEPERIDINE HYDROCHLORIDE 25 MG/ML
12.5 INJECTION INTRAMUSCULAR; INTRAVENOUS; SUBCUTANEOUS EVERY 5 MIN PRN
Status: DISCONTINUED | OUTPATIENT
Start: 2019-07-02 | End: 2019-07-02 | Stop reason: HOSPADM

## 2019-07-02 RX ORDER — AMLODIPINE BESYLATE 5 MG/1
5 TABLET ORAL NIGHTLY
Status: DISCONTINUED | OUTPATIENT
Start: 2019-07-02 | End: 2019-07-04 | Stop reason: HOSPADM

## 2019-07-02 RX ORDER — ACETAMINOPHEN 325 MG/1
650 TABLET ORAL EVERY 4 HOURS PRN
Status: DISCONTINUED | OUTPATIENT
Start: 2019-07-02 | End: 2019-07-04 | Stop reason: HOSPADM

## 2019-07-02 RX ORDER — ROCURONIUM BROMIDE 10 MG/ML
INJECTION, SOLUTION INTRAVENOUS PRN
Status: DISCONTINUED | OUTPATIENT
Start: 2019-07-02 | End: 2019-07-02 | Stop reason: SDUPTHER

## 2019-07-02 RX ORDER — GLYCOPYRROLATE 1 MG/5 ML
SYRINGE (ML) INTRAVENOUS PRN
Status: DISCONTINUED | OUTPATIENT
Start: 2019-07-02 | End: 2019-07-02 | Stop reason: SDUPTHER

## 2019-07-02 RX ORDER — DOCUSATE SODIUM 100 MG/1
100 CAPSULE, LIQUID FILLED ORAL 2 TIMES DAILY
Status: DISCONTINUED | OUTPATIENT
Start: 2019-07-02 | End: 2019-07-04 | Stop reason: HOSPADM

## 2019-07-02 RX ORDER — SODIUM CHLORIDE 0.9 % (FLUSH) 0.9 %
10 SYRINGE (ML) INJECTION PRN
Status: DISCONTINUED | OUTPATIENT
Start: 2019-07-02 | End: 2019-07-04

## 2019-07-02 RX ORDER — SODIUM CHLORIDE 0.9 % (FLUSH) 0.9 %
10 SYRINGE (ML) INJECTION EVERY 12 HOURS SCHEDULED
Status: DISCONTINUED | OUTPATIENT
Start: 2019-07-02 | End: 2019-07-04

## 2019-07-02 RX ORDER — METOPROLOL SUCCINATE 25 MG/1
12.5 TABLET, EXTENDED RELEASE ORAL DAILY
Status: DISCONTINUED | OUTPATIENT
Start: 2019-07-02 | End: 2019-07-04 | Stop reason: HOSPADM

## 2019-07-02 RX ORDER — PROPOFOL 10 MG/ML
INJECTION, EMULSION INTRAVENOUS PRN
Status: DISCONTINUED | OUTPATIENT
Start: 2019-07-02 | End: 2019-07-02 | Stop reason: SDUPTHER

## 2019-07-02 RX ORDER — NEOSTIGMINE METHYLSULFATE 1 MG/ML
INJECTION, SOLUTION INTRAVENOUS PRN
Status: DISCONTINUED | OUTPATIENT
Start: 2019-07-02 | End: 2019-07-02 | Stop reason: SDUPTHER

## 2019-07-02 RX ORDER — ONDANSETRON 2 MG/ML
INJECTION INTRAMUSCULAR; INTRAVENOUS PRN
Status: DISCONTINUED | OUTPATIENT
Start: 2019-07-02 | End: 2019-07-02 | Stop reason: SDUPTHER

## 2019-07-02 RX ORDER — PROMETHAZINE HYDROCHLORIDE 25 MG/ML
12.5 INJECTION, SOLUTION INTRAMUSCULAR; INTRAVENOUS
Status: DISCONTINUED | OUTPATIENT
Start: 2019-07-02 | End: 2019-07-02 | Stop reason: HOSPADM

## 2019-07-02 RX ORDER — FENTANYL CITRATE 50 UG/ML
INJECTION, SOLUTION INTRAMUSCULAR; INTRAVENOUS PRN
Status: DISCONTINUED | OUTPATIENT
Start: 2019-07-02 | End: 2019-07-02 | Stop reason: SDUPTHER

## 2019-07-02 RX ORDER — ONDANSETRON 2 MG/ML
4 INJECTION INTRAMUSCULAR; INTRAVENOUS EVERY 6 HOURS PRN
Status: DISCONTINUED | OUTPATIENT
Start: 2019-07-02 | End: 2019-07-04 | Stop reason: HOSPADM

## 2019-07-02 RX ORDER — LIDOCAINE HYDROCHLORIDE 20 MG/ML
INJECTION, SOLUTION INFILTRATION; PERINEURAL PRN
Status: DISCONTINUED | OUTPATIENT
Start: 2019-07-02 | End: 2019-07-02 | Stop reason: SDUPTHER

## 2019-07-02 RX ORDER — SODIUM CHLORIDE 9 MG/ML
INJECTION, SOLUTION INTRAVENOUS CONTINUOUS
Status: DISCONTINUED | OUTPATIENT
Start: 2019-07-02 | End: 2019-07-02

## 2019-07-02 RX ORDER — METOCLOPRAMIDE HYDROCHLORIDE 5 MG/ML
10 INJECTION INTRAMUSCULAR; INTRAVENOUS
Status: DISCONTINUED | OUTPATIENT
Start: 2019-07-02 | End: 2019-07-02 | Stop reason: HOSPADM

## 2019-07-02 RX ORDER — GABAPENTIN 100 MG/1
100 CAPSULE ORAL NIGHTLY
Status: DISCONTINUED | OUTPATIENT
Start: 2019-07-02 | End: 2019-07-04 | Stop reason: HOSPADM

## 2019-07-02 RX ORDER — SODIUM CHLORIDE 9 MG/ML
INJECTION, SOLUTION INTRAVENOUS CONTINUOUS
Status: DISCONTINUED | OUTPATIENT
Start: 2019-07-02 | End: 2019-07-04 | Stop reason: HOSPADM

## 2019-07-02 RX ORDER — EPHEDRINE SULFATE/0.9% NACL/PF 50 MG/5 ML
SYRINGE (ML) INTRAVENOUS PRN
Status: DISCONTINUED | OUTPATIENT
Start: 2019-07-02 | End: 2019-07-02 | Stop reason: SDUPTHER

## 2019-07-02 RX ORDER — METOPROLOL SUCCINATE 25 MG/1
25 TABLET, EXTENDED RELEASE ORAL DAILY
Status: DISCONTINUED | OUTPATIENT
Start: 2019-07-02 | End: 2019-07-02

## 2019-07-02 RX ORDER — FENTANYL CITRATE 50 UG/ML
50 INJECTION, SOLUTION INTRAMUSCULAR; INTRAVENOUS EVERY 5 MIN PRN
Status: DISCONTINUED | OUTPATIENT
Start: 2019-07-02 | End: 2019-07-02 | Stop reason: HOSPADM

## 2019-07-02 RX ORDER — MORPHINE SULFATE 2 MG/ML
4 INJECTION, SOLUTION INTRAMUSCULAR; INTRAVENOUS
Status: DISCONTINUED | OUTPATIENT
Start: 2019-07-02 | End: 2019-07-04 | Stop reason: HOSPADM

## 2019-07-02 RX ORDER — HYDROCODONE BITARTRATE AND ACETAMINOPHEN 5; 325 MG/1; MG/1
2 TABLET ORAL EVERY 4 HOURS PRN
Status: DISCONTINUED | OUTPATIENT
Start: 2019-07-02 | End: 2019-07-04 | Stop reason: HOSPADM

## 2019-07-02 RX ORDER — FENTANYL CITRATE 50 UG/ML
25 INJECTION, SOLUTION INTRAMUSCULAR; INTRAVENOUS EVERY 5 MIN PRN
Status: DISCONTINUED | OUTPATIENT
Start: 2019-07-02 | End: 2019-07-02 | Stop reason: HOSPADM

## 2019-07-02 RX ORDER — SCOLOPAMINE TRANSDERMAL SYSTEM 1 MG/1
1 PATCH, EXTENDED RELEASE TRANSDERMAL ONCE
Status: DISCONTINUED | OUTPATIENT
Start: 2019-07-02 | End: 2019-07-04 | Stop reason: HOSPADM

## 2019-07-02 RX ORDER — AMIODARONE HYDROCHLORIDE 200 MG/1
100 TABLET ORAL NIGHTLY
Status: DISCONTINUED | OUTPATIENT
Start: 2019-07-02 | End: 2019-07-04 | Stop reason: HOSPADM

## 2019-07-02 RX ORDER — LABETALOL 20 MG/4 ML (5 MG/ML) INTRAVENOUS SYRINGE
5 EVERY 10 MIN PRN
Status: DISCONTINUED | OUTPATIENT
Start: 2019-07-02 | End: 2019-07-02 | Stop reason: HOSPADM

## 2019-07-02 RX ORDER — MORPHINE SULFATE 2 MG/ML
2 INJECTION, SOLUTION INTRAMUSCULAR; INTRAVENOUS
Status: DISCONTINUED | OUTPATIENT
Start: 2019-07-02 | End: 2019-07-04 | Stop reason: HOSPADM

## 2019-07-02 RX ORDER — HYDROCODONE BITARTRATE AND ACETAMINOPHEN 5; 325 MG/1; MG/1
1 TABLET ORAL EVERY 4 HOURS PRN
Status: DISCONTINUED | OUTPATIENT
Start: 2019-07-02 | End: 2019-07-04 | Stop reason: HOSPADM

## 2019-07-02 RX ORDER — DIPHENHYDRAMINE HYDROCHLORIDE 50 MG/ML
12.5 INJECTION INTRAMUSCULAR; INTRAVENOUS
Status: DISCONTINUED | OUTPATIENT
Start: 2019-07-02 | End: 2019-07-02 | Stop reason: HOSPADM

## 2019-07-02 RX ORDER — DEXAMETHASONE SODIUM PHOSPHATE 4 MG/ML
INJECTION, SOLUTION INTRA-ARTICULAR; INTRALESIONAL; INTRAMUSCULAR; INTRAVENOUS; SOFT TISSUE PRN
Status: DISCONTINUED | OUTPATIENT
Start: 2019-07-02 | End: 2019-07-02 | Stop reason: SDUPTHER

## 2019-07-02 RX ADMIN — GABAPENTIN 100 MG: 100 CAPSULE ORAL at 21:09

## 2019-07-02 RX ADMIN — SODIUM CHLORIDE: 9 INJECTION, SOLUTION INTRAVENOUS at 10:52

## 2019-07-02 RX ADMIN — AMLODIPINE BESYLATE 5 MG: 5 TABLET ORAL at 21:09

## 2019-07-02 RX ADMIN — DEXAMETHASONE SODIUM PHOSPHATE 8 MG: 4 INJECTION, SOLUTION INTRAMUSCULAR; INTRAVENOUS at 12:05

## 2019-07-02 RX ADMIN — FENTANYL CITRATE 50 MCG: 50 INJECTION INTRAMUSCULAR; INTRAVENOUS at 11:56

## 2019-07-02 RX ADMIN — DEXTROSE MONOHYDRATE 2 G: 50 INJECTION, SOLUTION INTRAVENOUS at 21:08

## 2019-07-02 RX ADMIN — LIDOCAINE HYDROCHLORIDE 20 MG: 20 INJECTION, SOLUTION INFILTRATION; PERINEURAL at 12:05

## 2019-07-02 RX ADMIN — ATORVASTATIN CALCIUM 20 MG: 20 TABLET, FILM COATED ORAL at 21:09

## 2019-07-02 RX ADMIN — PROPOFOL 50 MG: 10 INJECTION, EMULSION INTRAVENOUS at 12:05

## 2019-07-02 RX ADMIN — DOCUSATE SODIUM 100 MG: 100 CAPSULE, LIQUID FILLED ORAL at 21:11

## 2019-07-02 RX ADMIN — AMIODARONE HYDROCHLORIDE 100 MG: 200 TABLET ORAL at 21:09

## 2019-07-02 RX ADMIN — LIDOCAINE HYDROCHLORIDE 40 MG: 20 INJECTION, SOLUTION INFILTRATION; PERINEURAL at 11:56

## 2019-07-02 RX ADMIN — Medication 10 MG: at 12:55

## 2019-07-02 RX ADMIN — NEOSTIGMINE METHYLSULFATE 3 MG: 1 INJECTION, SOLUTION INTRAVENOUS at 13:10

## 2019-07-02 RX ADMIN — ONDANSETRON HYDROCHLORIDE 4 MG: 4 INJECTION, SOLUTION INTRAMUSCULAR; INTRAVENOUS at 12:05

## 2019-07-02 RX ADMIN — ROCURONIUM BROMIDE 30 MG: 10 INJECTION INTRAVENOUS at 11:56

## 2019-07-02 RX ADMIN — SODIUM CHLORIDE: 9 INJECTION, SOLUTION INTRAVENOUS at 11:16

## 2019-07-02 RX ADMIN — SODIUM CHLORIDE: 9 INJECTION, SOLUTION INTRAVENOUS at 18:13

## 2019-07-02 RX ADMIN — PROPOFOL 120 MG: 10 INJECTION, EMULSION INTRAVENOUS at 11:56

## 2019-07-02 RX ADMIN — FENTANYL CITRATE 50 MCG: 50 INJECTION INTRAMUSCULAR; INTRAVENOUS at 12:05

## 2019-07-02 RX ADMIN — Medication 0.6 MG: at 13:10

## 2019-07-02 RX ADMIN — PROPOFOL 30 MG: 10 INJECTION, EMULSION INTRAVENOUS at 12:15

## 2019-07-02 RX ADMIN — CEFTRIAXONE SODIUM 2 G: 2 INJECTION, POWDER, FOR SOLUTION INTRAMUSCULAR; INTRAVENOUS at 12:00

## 2019-07-02 ASSESSMENT — PULMONARY FUNCTION TESTS
PIF_VALUE: 18
PIF_VALUE: 19
PIF_VALUE: 19
PIF_VALUE: 18
PIF_VALUE: 0
PIF_VALUE: 19
PIF_VALUE: 19
PIF_VALUE: 18
PIF_VALUE: 19
PIF_VALUE: 0
PIF_VALUE: 0
PIF_VALUE: 18
PIF_VALUE: 2
PIF_VALUE: 18
PIF_VALUE: 18
PIF_VALUE: 0
PIF_VALUE: 1
PIF_VALUE: 18
PIF_VALUE: 4
PIF_VALUE: 1
PIF_VALUE: 18
PIF_VALUE: 18
PIF_VALUE: 19
PIF_VALUE: 19
PIF_VALUE: 18
PIF_VALUE: 18
PIF_VALUE: 19
PIF_VALUE: 18
PIF_VALUE: 18
PIF_VALUE: 2
PIF_VALUE: 19
PIF_VALUE: 3
PIF_VALUE: 18
PIF_VALUE: 18
PIF_VALUE: 19
PIF_VALUE: 18
PIF_VALUE: 18
PIF_VALUE: 1
PIF_VALUE: 0
PIF_VALUE: 18
PIF_VALUE: 18
PIF_VALUE: 8
PIF_VALUE: 18
PIF_VALUE: 15
PIF_VALUE: 1
PIF_VALUE: 19
PIF_VALUE: 14
PIF_VALUE: 18
PIF_VALUE: 19
PIF_VALUE: 18
PIF_VALUE: 19
PIF_VALUE: 18
PIF_VALUE: 19
PIF_VALUE: 18
PIF_VALUE: 10
PIF_VALUE: 18
PIF_VALUE: 19
PIF_VALUE: 18
PIF_VALUE: 20
PIF_VALUE: 18
PIF_VALUE: 18
PIF_VALUE: 0
PIF_VALUE: 18
PIF_VALUE: 19
PIF_VALUE: 18
PIF_VALUE: 27
PIF_VALUE: 18
PIF_VALUE: 19
PIF_VALUE: 18
PIF_VALUE: 18
PIF_VALUE: 17
PIF_VALUE: 27

## 2019-07-02 ASSESSMENT — PAIN DESCRIPTION - FREQUENCY
FREQUENCY: INTERMITTENT
FREQUENCY: CONTINUOUS

## 2019-07-02 ASSESSMENT — PAIN DESCRIPTION - PAIN TYPE
TYPE: SURGICAL PAIN

## 2019-07-02 ASSESSMENT — PAIN DESCRIPTION - DESCRIPTORS
DESCRIPTORS: BURNING

## 2019-07-02 ASSESSMENT — PAIN SCALES - GENERAL
PAINLEVEL_OUTOF10: 2
PAINLEVEL_OUTOF10: 1
PAINLEVEL_OUTOF10: 0
PAINLEVEL_OUTOF10: 1

## 2019-07-02 ASSESSMENT — PAIN DESCRIPTION - LOCATION
LOCATION: PENIS

## 2019-07-02 ASSESSMENT — PAIN DESCRIPTION - ONSET: ONSET: ON-GOING

## 2019-07-02 ASSESSMENT — PAIN DESCRIPTION - PROGRESSION
CLINICAL_PROGRESSION: GRADUALLY IMPROVING
CLINICAL_PROGRESSION: GRADUALLY IMPROVING

## 2019-07-02 ASSESSMENT — PAIN - FUNCTIONAL ASSESSMENT: PAIN_FUNCTIONAL_ASSESSMENT: 0-10

## 2019-07-02 NOTE — PROGRESS NOTES
1405:  Patient returned to [facility] room 10 from PACU. Report received from [facility]. Family at bedside. Water provided for patient. CBI running without problems; urine bloody. Call light in reach.

## 2019-07-02 NOTE — ANESTHESIA PRE PROCEDURE
06/25/19 0923 07/02/19 1032   BP:  (!) 127/59   Pulse:  67   Resp:  18   Temp:  97 °F (36.1 °C)   TempSrc:  Temporal   SpO2:  99%   Weight: 160 lb (72.6 kg) 163 lb 6.4 oz (74.1 kg)   Height: 5' 7.5\" (1.715 m) 5' 7.5\" (1.715 m)                                              BP Readings from Last 3 Encounters:   07/02/19 (!) 127/59   06/26/19 (!) 111/57   05/29/19 (!) 113/55       NPO Status: Time of last liquid consumption: 0730(sip with med)                        Time of last solid consumption: 2000                        Date of last liquid consumption: 07/02/19                        Date of last solid food consumption: 07/01/19    BMI:   Wt Readings from Last 3 Encounters:   07/02/19 163 lb 6.4 oz (74.1 kg)   06/26/19 167 lb 3.2 oz (75.8 kg)   05/29/19 168 lb 6.4 oz (76.4 kg)     Body mass index is 25.21 kg/m². CBC:   Lab Results   Component Value Date    WBC 9.3 06/26/2019    RBC 4.63 06/26/2019    HGB 12.5 06/26/2019    HCT 40.0 06/26/2019    MCV 86 06/26/2019    RDW 12.8 06/26/2019     06/26/2019       CMP:   Lab Results   Component Value Date     06/26/2019     11/19/2018    K 4.5 06/26/2019    K 3.8 11/19/2018     11/19/2018    CO2 26 11/19/2018    BUN 11 11/19/2018    CREATININE 1.0 06/26/2019    CREATININE 0.73 11/19/2018    CREATININE 1.2 02/03/2015    LABGLOM 72 12/20/2013    GLUCOSE 137 11/19/2018    PROT 6.9 06/26/2019    CALCIUM 8.5 11/19/2018    BILITOT 0.3 06/26/2019    ALKPHOS 247 06/26/2019    AST 13 06/26/2019    ALT 15 06/26/2019       POC Tests: No results for input(s): POCGLU, POCNA, POCK, POCCL, POCBUN, POCHEMO, POCHCT in the last 72 hours.     Coags: No results found for: PROTIME, INR, APTT    HCG (If Applicable): No results found for: PREGTESTUR, PREGSERUM, HCG, HCGQUANT     ABGs: No results found for: PHART, PO2ART, VVX9YZF, ITM3ALL, BEART, M7OVVGJU     Type & Screen (If Applicable):  Lab Results   Component Value Date    [facility] POS 12/18/2013       Anesthesia

## 2019-07-03 LAB
ANION GAP SERPL CALCULATED.3IONS-SCNC: 10 MEQ/L (ref 8–16)
BUN BLDV-MCNC: 12 MG/DL (ref 7–22)
CALCIUM SERPL-MCNC: 8 MG/DL (ref 8.5–10.5)
CHLORIDE BLD-SCNC: 106 MEQ/L (ref 98–111)
CO2: 22 MEQ/L (ref 23–33)
CREAT SERPL-MCNC: 0.8 MG/DL (ref 0.4–1.2)
ERYTHROCYTE [DISTWIDTH] IN BLOOD BY AUTOMATED COUNT: 14.4 % (ref 11.5–14.5)
ERYTHROCYTE [DISTWIDTH] IN BLOOD BY AUTOMATED COUNT: 46.5 FL (ref 35–45)
GFR SERPL CREATININE-BSD FRML MDRD: > 90 ML/MIN/1.73M2
GLUCOSE BLD-MCNC: 205 MG/DL (ref 70–108)
HCT VFR BLD CALC: 28.3 % (ref 42–52)
HCT VFR BLD CALC: 29.5 % (ref 42–52)
HEMOGLOBIN: 8.8 GM/DL (ref 14–18)
HEMOGLOBIN: 9.4 GM/DL (ref 14–18)
MCH RBC QN AUTO: 28.6 PG (ref 26–33)
MCHC RBC AUTO-ENTMCNC: 31.9 GM/DL (ref 32.2–35.5)
MCV RBC AUTO: 89.7 FL (ref 80–94)
PLATELET # BLD: 225 THOU/MM3 (ref 130–400)
PMV BLD AUTO: 11.1 FL (ref 9.4–12.4)
POTASSIUM SERPL-SCNC: 4.3 MEQ/L (ref 3.5–5.2)
RBC # BLD: 3.29 MILL/MM3 (ref 4.7–6.1)
SODIUM BLD-SCNC: 138 MEQ/L (ref 135–145)
WBC # BLD: 15.9 THOU/MM3 (ref 4.8–10.8)

## 2019-07-03 PROCEDURE — 80048 BASIC METABOLIC PNL TOTAL CA: CPT

## 2019-07-03 PROCEDURE — 2580000003 HC RX 258: Performed by: NURSE PRACTITIONER

## 2019-07-03 PROCEDURE — 6370000000 HC RX 637 (ALT 250 FOR IP): Performed by: NURSE PRACTITIONER

## 2019-07-03 PROCEDURE — 96361 HYDRATE IV INFUSION ADD-ON: CPT

## 2019-07-03 PROCEDURE — 51700 IRRIGATION OF BLADDER: CPT

## 2019-07-03 PROCEDURE — 85014 HEMATOCRIT: CPT

## 2019-07-03 PROCEDURE — 85027 COMPLETE CBC AUTOMATED: CPT

## 2019-07-03 PROCEDURE — 96368 THER/DIAG CONCURRENT INF: CPT

## 2019-07-03 PROCEDURE — 6360000002 HC RX W HCPCS: Performed by: NURSE PRACTITIONER

## 2019-07-03 PROCEDURE — 6370000000 HC RX 637 (ALT 250 FOR IP): Performed by: UROLOGY

## 2019-07-03 PROCEDURE — 36415 COLL VENOUS BLD VENIPUNCTURE: CPT

## 2019-07-03 PROCEDURE — 85018 HEMOGLOBIN: CPT

## 2019-07-03 PROCEDURE — 99024 POSTOP FOLLOW-UP VISIT: CPT | Performed by: NURSE PRACTITIONER

## 2019-07-03 RX ORDER — CLOPIDOGREL BISULFATE 75 MG/1
75 TABLET ORAL DAILY
Qty: 30 TABLET | Refills: 3 | Status: SHIPPED
Start: 2019-07-06 | End: 2019-07-04 | Stop reason: HOSPADM

## 2019-07-03 RX ADMIN — SODIUM CHLORIDE: 9 INJECTION, SOLUTION INTRAVENOUS at 02:53

## 2019-07-03 RX ADMIN — CEFTRIAXONE SODIUM 1 G: 1 INJECTION, POWDER, FOR SOLUTION INTRAMUSCULAR; INTRAVENOUS at 13:49

## 2019-07-03 RX ADMIN — DEXTROSE MONOHYDRATE 2 G: 50 INJECTION, SOLUTION INTRAVENOUS at 04:27

## 2019-07-03 RX ADMIN — SODIUM CHLORIDE: 9 INJECTION, SOLUTION INTRAVENOUS at 18:42

## 2019-07-03 RX ADMIN — ATORVASTATIN CALCIUM 20 MG: 20 TABLET, FILM COATED ORAL at 20:37

## 2019-07-03 RX ADMIN — DOCUSATE SODIUM 100 MG: 100 CAPSULE, LIQUID FILLED ORAL at 20:40

## 2019-07-03 RX ADMIN — AMIODARONE HYDROCHLORIDE 100 MG: 200 TABLET ORAL at 20:37

## 2019-07-03 RX ADMIN — DOCUSATE SODIUM 100 MG: 100 CAPSULE, LIQUID FILLED ORAL at 08:32

## 2019-07-03 RX ADMIN — METOPROLOL SUCCINATE 12.5 MG: 25 TABLET, EXTENDED RELEASE ORAL at 08:32

## 2019-07-03 ASSESSMENT — PAIN SCALES - GENERAL
PAINLEVEL_OUTOF10: 0

## 2019-07-03 NOTE — PLAN OF CARE
Problem: Falls - Risk of:  Goal: Will remain free from falls  Description  Will remain free from falls  Outcome: Ongoing  Note:   Patient free from falls this shift. Gait steady with 1 assist/walker. Alert and oriented x 4, using call light appropriately. Goal: Absence of physical injury  Description  Absence of physical injury  Outcome: Ongoing     Problem: Pain Control  Goal: Maintain pain level at or below patient's acceptable level (or 5 if patient is unable to determine acceptable level)  Outcome: Ongoing  Flowsheets (Taken 7/2/2019 2211)  Patient's Stated Pain Goal: No pain  Note:   Patient denies pain at this time. Meeting pain goal of 0/10 with no interventions. Problem: Cardiovascular  Goal: No DVT, peripheral vascular complications  Outcome: Ongoing  Note:   Patient free from signs of dvt this shift. No pain, warmth or redness to calves. Scd's in use. Problem:   Goal: Adequate urinary output  Outcome: Ongoing  Note:   Falcon with cbi running at moderate rate in place. Urine is bloody/clear with no clots. No urethral drainage. Falcon care provided with chlorhexidine solution. Goal: No urinary complication  Outcome: Ongoing     Problem: Discharge Planning:  Goal: Patients continuum of care needs are met  Description  Patients continuum of care needs are met  Outcome: Ongoing  Note:   Patient anticipates being discharged to home with wife, denies needs at this time. Care plan reviewed with patient. Patient verbalized understanding of the plan of care and contribute to goal setting.

## 2019-07-04 VITALS
OXYGEN SATURATION: 99 % | HEART RATE: 75 BPM | HEIGHT: 68 IN | DIASTOLIC BLOOD PRESSURE: 59 MMHG | SYSTOLIC BLOOD PRESSURE: 134 MMHG | BODY MASS INDEX: 24.77 KG/M2 | TEMPERATURE: 97.4 F | RESPIRATION RATE: 16 BRPM | WEIGHT: 163.4 LBS

## 2019-07-04 LAB
ERYTHROCYTE [DISTWIDTH] IN BLOOD BY AUTOMATED COUNT: 14.7 % (ref 11.5–14.5)
ERYTHROCYTE [DISTWIDTH] IN BLOOD BY AUTOMATED COUNT: 47.5 FL (ref 35–45)
HCT VFR BLD CALC: 29.3 % (ref 42–52)
HEMOGLOBIN: 9.3 GM/DL (ref 14–18)
LACTIC ACID, SEPSIS: 1.8 MMOL/L (ref 0.5–1.9)
MCH RBC QN AUTO: 28.2 PG (ref 26–33)
MCHC RBC AUTO-ENTMCNC: 31.7 GM/DL (ref 32.2–35.5)
MCV RBC AUTO: 88.8 FL (ref 80–94)
PLATELET # BLD: 230 THOU/MM3 (ref 130–400)
PMV BLD AUTO: 11.2 FL (ref 9.4–12.4)
RBC # BLD: 3.3 MILL/MM3 (ref 4.7–6.1)
REASON FOR REJECTION: NORMAL
REJECTED TEST: NORMAL
WBC # BLD: 17.8 THOU/MM3 (ref 4.8–10.8)

## 2019-07-04 PROCEDURE — 6360000002 HC RX W HCPCS: Performed by: NURSE PRACTITIONER

## 2019-07-04 PROCEDURE — 2580000003 HC RX 258: Performed by: NURSE PRACTITIONER

## 2019-07-04 PROCEDURE — 2709999900 HC NON-CHARGEABLE SUPPLY

## 2019-07-04 PROCEDURE — 85027 COMPLETE CBC AUTOMATED: CPT

## 2019-07-04 PROCEDURE — 96374 THER/PROPH/DIAG INJ IV PUSH: CPT

## 2019-07-04 PROCEDURE — 51798 US URINE CAPACITY MEASURE: CPT

## 2019-07-04 PROCEDURE — 51702 INSERT TEMP BLADDER CATH: CPT

## 2019-07-04 PROCEDURE — 96361 HYDRATE IV INFUSION ADD-ON: CPT

## 2019-07-04 PROCEDURE — 6370000000 HC RX 637 (ALT 250 FOR IP): Performed by: UROLOGY

## 2019-07-04 PROCEDURE — 6370000000 HC RX 637 (ALT 250 FOR IP): Performed by: NURSE PRACTITIONER

## 2019-07-04 PROCEDURE — 87040 BLOOD CULTURE FOR BACTERIA: CPT

## 2019-07-04 PROCEDURE — 36415 COLL VENOUS BLD VENIPUNCTURE: CPT

## 2019-07-04 PROCEDURE — 83605 ASSAY OF LACTIC ACID: CPT

## 2019-07-04 RX ORDER — CIPROFLOXACIN 500 MG/1
500 TABLET, FILM COATED ORAL 2 TIMES DAILY
Qty: 10 TABLET | Refills: 0 | Status: SHIPPED | OUTPATIENT
Start: 2019-07-04 | End: 2019-07-08 | Stop reason: SINTOL

## 2019-07-04 RX ADMIN — METOPROLOL SUCCINATE 12.5 MG: 25 TABLET, EXTENDED RELEASE ORAL at 09:26

## 2019-07-04 RX ADMIN — DOCUSATE SODIUM 100 MG: 100 CAPSULE, LIQUID FILLED ORAL at 09:25

## 2019-07-04 RX ADMIN — CEFTRIAXONE SODIUM 1 G: 1 INJECTION, POWDER, FOR SOLUTION INTRAMUSCULAR; INTRAVENOUS at 11:32

## 2019-07-04 RX ADMIN — ONDANSETRON 4 MG: 2 INJECTION INTRAMUSCULAR; INTRAVENOUS at 06:49

## 2019-07-04 ASSESSMENT — PAIN SCALES - GENERAL
PAINLEVEL_OUTOF10: 0
PAINLEVEL_OUTOF10: 0

## 2019-07-05 ENCOUNTER — TELEPHONE (OUTPATIENT)
Dept: UROLOGY | Age: 84
End: 2019-07-05

## 2019-07-08 ENCOUNTER — TELEPHONE (OUTPATIENT)
Dept: UROLOGY | Age: 84
End: 2019-07-08

## 2019-07-08 RX ORDER — CEFDINIR 300 MG/1
300 CAPSULE ORAL 2 TIMES DAILY
Qty: 14 CAPSULE | Refills: 0 | Status: SHIPPED | OUTPATIENT
Start: 2019-07-08 | End: 2019-07-12 | Stop reason: ALTCHOICE

## 2019-07-08 NOTE — TELEPHONE ENCOUNTER
Please see note sent to Dr Machelle Sepulveda on 7/5/19. No one got back to the patient. He can not take the Cipro. He went to a Urgent Care yesterday and was started on Nitrofurantoin. This is upsetting his stomach and he is taking it with food. Please advise.  Patient's number is [phone]

## 2019-07-10 LAB
BLOOD CULTURE, ROUTINE: NORMAL
BLOOD CULTURE, ROUTINE: NORMAL

## 2019-07-11 ENCOUNTER — OFFICE VISIT (OUTPATIENT)
Dept: UROLOGY | Age: 84
End: 2019-07-11

## 2019-07-11 VITALS
DIASTOLIC BLOOD PRESSURE: 70 MMHG | HEIGHT: 68 IN | BODY MASS INDEX: 24.71 KG/M2 | WEIGHT: 163 LBS | SYSTOLIC BLOOD PRESSURE: 128 MMHG

## 2019-07-11 DIAGNOSIS — R33.9 INCOMPLETE BLADDER EMPTYING: Primary | ICD-10-CM

## 2019-07-11 DIAGNOSIS — R35.0 URINARY FREQUENCY: ICD-10-CM

## 2019-07-11 DIAGNOSIS — N41.9 PROSTATITIS, UNSPECIFIED PROSTATITIS TYPE: ICD-10-CM

## 2019-07-11 PROCEDURE — 99024 POSTOP FOLLOW-UP VISIT: CPT | Performed by: NURSE PRACTITIONER

## 2019-07-11 RX ORDER — DOXYCYCLINE HYCLATE 100 MG
100 TABLET ORAL 2 TIMES DAILY
Qty: 42 TABLET | Refills: 0 | Status: SHIPPED | OUTPATIENT
Start: 2019-07-11 | End: 2019-07-12 | Stop reason: ALTCHOICE

## 2019-07-11 RX ORDER — CLOPIDOGREL BISULFATE 75 MG/1
75 TABLET ORAL DAILY
COMMUNITY

## 2019-07-12 ENCOUNTER — TELEPHONE (OUTPATIENT)
Dept: UROLOGY | Age: 84
End: 2019-07-12

## 2019-07-12 RX ORDER — CEFDINIR 300 MG/1
300 CAPSULE ORAL 2 TIMES DAILY
Qty: 42 CAPSULE | Refills: 0 | Status: SHIPPED | OUTPATIENT
Start: 2019-07-12 | End: 2019-07-15 | Stop reason: SDUPTHER

## 2019-07-15 PROBLEM — R65.10 SIRS (SYSTEMIC INFLAMMATORY RESPONSE SYNDROME) (HCC): Status: ACTIVE | Noted: 2018-01-05

## 2019-07-15 PROBLEM — C7B.8 METASTATIC MALIGNANT NEUROENDOCRINE TUMOR TO LIVER (HCC): Status: ACTIVE | Noted: 2018-01-02

## 2019-07-15 RX ORDER — ATORVASTATIN CALCIUM 20 MG/1
20 TABLET, FILM COATED ORAL DAILY
COMMUNITY
End: 2019-07-15

## 2019-07-15 RX ORDER — MULTIVITAMIN
1 CAPSULE ORAL
COMMUNITY
End: 2019-10-09

## 2019-07-15 RX ORDER — CEFDINIR 300 MG/1
300 CAPSULE ORAL 2 TIMES DAILY
Qty: 42 CAPSULE | Refills: 0 | Status: SHIPPED | OUTPATIENT
Start: 2019-07-15 | End: 2019-07-29 | Stop reason: SDUPTHER

## 2019-07-15 NOTE — TELEPHONE ENCOUNTER
Sallie Hurd called requesting a refill on the following medications:  Requested Prescriptions     Pending Prescriptions Disp Refills    cefdinir (OMNICEF) 300 MG capsule 42 capsule 0     Sig: Take 1 capsule by mouth 2 times daily for 21 days     Pharmacy verified:  .pv    [id number] [facility]    Date of last visit: 07/11/19  Date of next visit (if applicable): 31/03/38

## 2019-07-19 ENCOUNTER — APPOINTMENT (OUTPATIENT)
Dept: CT IMAGING | Age: 84
End: 2019-07-19
Payer: MEDICARE

## 2019-07-19 ENCOUNTER — HOSPITAL ENCOUNTER (EMERGENCY)
Age: 84
Discharge: HOME | End: 2019-07-20
Attending: EMERGENCY MEDICINE
Payer: MEDICARE

## 2019-07-19 ENCOUNTER — APPOINTMENT (OUTPATIENT)
Dept: GENERAL RADIOLOGY | Age: 84
End: 2019-07-19
Payer: MEDICARE

## 2019-07-19 DIAGNOSIS — R10.9 ABDOMINAL PAIN, UNSPECIFIED ABDOMINAL LOCATION: Primary | ICD-10-CM

## 2019-07-19 DIAGNOSIS — C7B.02 METASTATIC MALIGNANT CARCINOID TUMOR TO LIVER (HCC): ICD-10-CM

## 2019-07-19 LAB
ALBUMIN SERPL-MCNC: 3.1 G/DL (ref 3.5–5.1)
ALP BLD-CCNC: 233 U/L (ref 38–126)
ALT SERPL-CCNC: 16 U/L (ref 11–66)
AMYLASE: 49 U/L (ref 20–104)
ANION GAP SERPL CALCULATED.3IONS-SCNC: 12 MEQ/L (ref 8–16)
APTT: 30.7 SECONDS (ref 22–38)
AST SERPL-CCNC: 15 U/L (ref 5–40)
BACTERIA: ABNORMAL /HPF
BASOPHILS # BLD: 0.4 %
BASOPHILS ABSOLUTE: 0 THOU/MM3 (ref 0–0.1)
BILIRUB SERPL-MCNC: 0.2 MG/DL (ref 0.3–1.2)
BILIRUBIN DIRECT: < 0.2 MG/DL (ref 0–0.3)
BILIRUBIN URINE: NEGATIVE
BLOOD, URINE: ABNORMAL
BUN BLDV-MCNC: 10 MG/DL (ref 7–22)
CALCIUM SERPL-MCNC: 8.6 MG/DL (ref 8.5–10.5)
CASTS 2: ABNORMAL /LPF
CASTS UA: ABNORMAL /LPF
CHARACTER, URINE: CLEAR
CHLORIDE BLD-SCNC: 96 MEQ/L (ref 98–111)
CO2: 27 MEQ/L (ref 23–33)
COLOR: YELLOW
CREAT SERPL-MCNC: 0.9 MG/DL (ref 0.4–1.2)
CRYSTALS, UA: ABNORMAL
EKG ATRIAL RATE: 76 BPM
EKG P AXIS: 91 DEGREES
EKG P-R INTERVAL: 196 MS
EKG Q-T INTERVAL: 396 MS
EKG QRS DURATION: 80 MS
EKG QTC CALCULATION (BAZETT): 445 MS
EKG R AXIS: -28 DEGREES
EKG T AXIS: 83 DEGREES
EKG VENTRICULAR RATE: 76 BPM
EOSINOPHIL # BLD: 4.7 %
EOSINOPHILS ABSOLUTE: 0.5 THOU/MM3 (ref 0–0.4)
EPITHELIAL CELLS, UA: ABNORMAL /HPF
ERYTHROCYTE [DISTWIDTH] IN BLOOD BY AUTOMATED COUNT: 14.8 % (ref 11.5–14.5)
ERYTHROCYTE [DISTWIDTH] IN BLOOD BY AUTOMATED COUNT: 48 FL (ref 35–45)
GFR SERPL CREATININE-BSD FRML MDRD: 80 ML/MIN/1.73M2
GLUCOSE BLD-MCNC: 165 MG/DL (ref 70–108)
GLUCOSE URINE: NEGATIVE MG/DL
HCT VFR BLD CALC: 28.3 % (ref 42–52)
HEMOGLOBIN: 8.7 GM/DL (ref 14–18)
IMMATURE GRANS (ABS): 0.05 THOU/MM3 (ref 0–0.07)
IMMATURE GRANULOCYTES: 0.5 %
INR BLD: 1.18 (ref 0.85–1.13)
KETONES, URINE: NEGATIVE
LACTIC ACID: 1.9 MMOL/L (ref 0.5–2.2)
LEUKOCYTE ESTERASE, URINE: ABNORMAL
LIPASE: 15.7 U/L (ref 5.6–51.3)
LYMPHOCYTES # BLD: 12.8 %
LYMPHOCYTES ABSOLUTE: 1.3 THOU/MM3 (ref 1–4.8)
MCH RBC QN AUTO: 26.9 PG (ref 26–33)
MCHC RBC AUTO-ENTMCNC: 30.7 GM/DL (ref 32.2–35.5)
MCV RBC AUTO: 87.6 FL (ref 80–94)
MISCELLANEOUS 2: ABNORMAL
MONOCYTES # BLD: 7.5 %
MONOCYTES ABSOLUTE: 0.8 THOU/MM3 (ref 0.4–1.3)
NITRITE, URINE: NEGATIVE
NUCLEATED RED BLOOD CELLS: 0 /100 WBC
OSMOLALITY CALCULATION: 272.8 MOSMOL/KG (ref 275–300)
PH UA: 8 (ref 5–9)
PLATELET # BLD: 374 THOU/MM3 (ref 130–400)
PMV BLD AUTO: 9.9 FL (ref 9.4–12.4)
POTASSIUM SERPL-SCNC: 4.7 MEQ/L (ref 3.5–5.2)
PROCALCITONIN: 0.09 NG/ML (ref 0.01–0.09)
PROTEIN UA: 30
RBC # BLD: 3.23 MILL/MM3 (ref 4.7–6.1)
RBC URINE: ABNORMAL /HPF
RENAL EPITHELIAL, UA: ABNORMAL
SEG NEUTROPHILS: 74.1 %
SEGMENTED NEUTROPHILS ABSOLUTE COUNT: 7.4 THOU/MM3 (ref 1.8–7.7)
SODIUM BLD-SCNC: 135 MEQ/L (ref 135–145)
SPECIFIC GRAVITY, URINE: 1.01 (ref 1–1.03)
TOTAL PROTEIN: 6.6 G/DL (ref 6.1–8)
TROPONIN T: < 0.01 NG/ML
UROBILINOGEN, URINE: 0.2 EU/DL (ref 0–1)
WBC # BLD: 10 THOU/MM3 (ref 4.8–10.8)
WBC UA: ABNORMAL /HPF
YEAST: ABNORMAL

## 2019-07-19 PROCEDURE — 85610 PROTHROMBIN TIME: CPT

## 2019-07-19 PROCEDURE — 81001 URINALYSIS AUTO W/SCOPE: CPT

## 2019-07-19 PROCEDURE — 83605 ASSAY OF LACTIC ACID: CPT

## 2019-07-19 PROCEDURE — 51702 INSERT TEMP BLADDER CATH: CPT

## 2019-07-19 PROCEDURE — 85730 THROMBOPLASTIN TIME PARTIAL: CPT

## 2019-07-19 PROCEDURE — 6360000004 HC RX CONTRAST MEDICATION: Performed by: EMERGENCY MEDICINE

## 2019-07-19 PROCEDURE — 82248 BILIRUBIN DIRECT: CPT

## 2019-07-19 PROCEDURE — 99285 EMERGENCY DEPT VISIT HI MDM: CPT

## 2019-07-19 PROCEDURE — 80053 COMPREHEN METABOLIC PANEL: CPT

## 2019-07-19 PROCEDURE — 71045 X-RAY EXAM CHEST 1 VIEW: CPT

## 2019-07-19 PROCEDURE — 87086 URINE CULTURE/COLONY COUNT: CPT

## 2019-07-19 PROCEDURE — 2580000003 HC RX 258: Performed by: EMERGENCY MEDICINE

## 2019-07-19 PROCEDURE — 84145 PROCALCITONIN (PCT): CPT

## 2019-07-19 PROCEDURE — 85025 COMPLETE CBC W/AUTO DIFF WBC: CPT

## 2019-07-19 PROCEDURE — 76376 3D RENDER W/INTRP POSTPROCES: CPT

## 2019-07-19 PROCEDURE — 36415 COLL VENOUS BLD VENIPUNCTURE: CPT

## 2019-07-19 PROCEDURE — 74177 CT ABD & PELVIS W/CONTRAST: CPT

## 2019-07-19 PROCEDURE — 83690 ASSAY OF LIPASE: CPT

## 2019-07-19 PROCEDURE — 84484 ASSAY OF TROPONIN QUANT: CPT

## 2019-07-19 PROCEDURE — 96375 TX/PRO/DX INJ NEW DRUG ADDON: CPT

## 2019-07-19 PROCEDURE — 93005 ELECTROCARDIOGRAM TRACING: CPT | Performed by: EMERGENCY MEDICINE

## 2019-07-19 PROCEDURE — 96374 THER/PROPH/DIAG INJ IV PUSH: CPT

## 2019-07-19 PROCEDURE — 82550 ASSAY OF CK (CPK): CPT

## 2019-07-19 PROCEDURE — 6360000002 HC RX W HCPCS: Performed by: EMERGENCY MEDICINE

## 2019-07-19 PROCEDURE — 82150 ASSAY OF AMYLASE: CPT

## 2019-07-19 PROCEDURE — 87040 BLOOD CULTURE FOR BACTERIA: CPT

## 2019-07-19 PROCEDURE — 83615 LACTATE (LD) (LDH) ENZYME: CPT

## 2019-07-19 RX ORDER — OXYCODONE HYDROCHLORIDE 5 MG/1
CAPSULE ORAL
Refills: 0 | COMMUNITY
Start: 2019-07-15 | End: 2019-09-04 | Stop reason: ALTCHOICE

## 2019-07-19 RX ORDER — PROMETHAZINE HYDROCHLORIDE 25 MG/1
TABLET ORAL
Refills: 0 | COMMUNITY
Start: 2019-07-15 | End: 2019-09-04 | Stop reason: ALTCHOICE

## 2019-07-19 RX ORDER — ONDANSETRON 2 MG/ML
4 INJECTION INTRAMUSCULAR; INTRAVENOUS ONCE
Status: COMPLETED | OUTPATIENT
Start: 2019-07-19 | End: 2019-07-19

## 2019-07-19 RX ORDER — SODIUM CHLORIDE 9 MG/ML
INJECTION, SOLUTION INTRAVENOUS CONTINUOUS
Status: DISCONTINUED | OUTPATIENT
Start: 2019-07-19 | End: 2019-07-20 | Stop reason: HOSPADM

## 2019-07-19 RX ORDER — MORPHINE SULFATE 2 MG/ML
2 INJECTION, SOLUTION INTRAMUSCULAR; INTRAVENOUS
Status: DISCONTINUED | OUTPATIENT
Start: 2019-07-19 | End: 2019-07-20 | Stop reason: HOSPADM

## 2019-07-19 RX ADMIN — IOPAMIDOL 80 ML: 755 INJECTION, SOLUTION INTRAVENOUS at 23:41

## 2019-07-19 RX ADMIN — SODIUM CHLORIDE: 9 INJECTION, SOLUTION INTRAVENOUS at 22:13

## 2019-07-19 RX ADMIN — MORPHINE SULFATE 2 MG: 2 INJECTION, SOLUTION INTRAMUSCULAR; INTRAVENOUS at 22:15

## 2019-07-19 RX ADMIN — ONDANSETRON 4 MG: 2 INJECTION INTRAMUSCULAR; INTRAVENOUS at 22:10

## 2019-07-19 ASSESSMENT — ENCOUNTER SYMPTOMS
EYE DISCHARGE: 0
RECTAL PAIN: 0
RHINORRHEA: 0
ABDOMINAL PAIN: 1
DIARRHEA: 0
SHORTNESS OF BREATH: 0
EYE ITCHING: 0
WHEEZING: 0
COUGH: 0
NAUSEA: 0
ABDOMINAL DISTENTION: 0
VOMITING: 0

## 2019-07-19 ASSESSMENT — PAIN SCALES - GENERAL
PAINLEVEL_OUTOF10: 9
PAINLEVEL_OUTOF10: 5

## 2019-07-19 ASSESSMENT — PAIN DESCRIPTION - PAIN TYPE: TYPE: ACUTE PAIN

## 2019-07-19 ASSESSMENT — PAIN DESCRIPTION - LOCATION: LOCATION: ABDOMEN

## 2019-07-19 ASSESSMENT — PAIN DESCRIPTION - FREQUENCY: FREQUENCY: CONTINUOUS

## 2019-07-20 VITALS
OXYGEN SATURATION: 97 % | HEART RATE: 74 BPM | WEIGHT: 156 LBS | HEIGHT: 67 IN | BODY MASS INDEX: 24.48 KG/M2 | DIASTOLIC BLOOD PRESSURE: 61 MMHG | TEMPERATURE: 99.1 F | RESPIRATION RATE: 13 BRPM | SYSTOLIC BLOOD PRESSURE: 136 MMHG

## 2019-07-20 LAB
LD: 163 U/L (ref 100–190)
TOTAL CK: 34 U/L (ref 55–170)

## 2019-07-20 PROCEDURE — 96376 TX/PRO/DX INJ SAME DRUG ADON: CPT

## 2019-07-20 PROCEDURE — 6360000002 HC RX W HCPCS: Performed by: EMERGENCY MEDICINE

## 2019-07-20 PROCEDURE — 93010 ELECTROCARDIOGRAM REPORT: CPT | Performed by: NUCLEAR MEDICINE

## 2019-07-20 RX ORDER — OXYCODONE HYDROCHLORIDE 5 MG/1
5 CAPSULE ORAL EVERY 4 HOURS PRN
Qty: 10 CAPSULE | Refills: 0 | Status: SHIPPED | OUTPATIENT
Start: 2019-07-20 | End: 2019-07-23

## 2019-07-20 RX ADMIN — MORPHINE SULFATE 2 MG: 2 INJECTION, SOLUTION INTRAMUSCULAR; INTRAVENOUS at 00:16

## 2019-07-20 ASSESSMENT — PAIN SCALES - GENERAL: PAINLEVEL_OUTOF10: 8

## 2019-07-20 NOTE — ED NOTES
RN into room at this time. Fellow RN obtaining IV access at this time.       [address] [place] [address], RN  07/19/19 1927

## 2019-07-20 NOTE — ED NOTES
Pt stable A&O x 3 given discharge and follow up info. Pt voiced no concerns and discharged from ER to self to home. Pt ambulated out of ER with no complications .        Jalen Lima RN  07/20/19 8126

## 2019-07-20 NOTE — ED TRIAGE NOTES
Pt c/o abdominal pain epigastric that radiates back to his mid back for about 3 weeks. Pt was recently seen at [facility] and had CT done for abdominal pain but they did not find anything causing the pain.

## 2019-07-20 NOTE — ED PROVIDER NOTES
is on Cefdinir. His CT A/P reveals multiple hepatic masses concerning for metastatic disease versus multifocal hepatic masses including HCC related changes. Nodular peritoneal lesions with associated mesenteric tethering producing adjacent small bowel wall thickening and luminal changes concerning for a ileus. A developing partial or incomplete small bowel obstruction is not excluded. Thick-walled urinary bladder changes with enlarged prostate demonstrating suspected postsurgical changes. Left renal cystic changes with additional fat-containing mass in the upper pole. Angiomyolipoma changes are considered. His pain seem secondary to his tumor progression. He is on Oxycodone already prescribed by [address] [place] [address] 151 ED. He can not take Tylenol due to widespread liver mets. I suggest he contact his [facility] oncologist as soon as possible for these CT findings. He has an appointment seeing Dr. Venida Ganser on 07/24/2019. CRITICAL CARE:   None    CONSULTS:  None    PROCEDURES:  None    FINAL IMPRESSION      1. Abdominal pain, unspecified abdominal location    2. 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 your oncoloist at [facility]      in one week for cancer progression      DISCHARGE MEDICATIONS:  Discharge Medication List as of 7/20/2019  1:12 AM      START taking these medications    Details   !! oxyCODONE 5 MG capsule Take 1 capsule by mouth every 4 hours as needed for Pain for up to 3 days. , Disp-10 capsule, R-0Print       !! - Potential duplicate medications found. Please discuss with provider.           (Please note that portions of this note were completed with a voice recognition program.  Efforts were made to edit the dictations but occasionally words aremis-transcribed.)    MD Lynda Blankenship MD  07/20/19

## 2019-07-20 NOTE — ED NOTES
Pt has completed oral contrast at this time. Family remains at bedside. Pt denies needs at this time. RN to continue to monitor.       [facility]  07/19/19 8999

## 2019-07-20 NOTE — ED NOTES
Pt returned from testing.       [address] [place] [address] DANITZA Flower  07/19/19 1554

## 2019-07-21 LAB — URINE CULTURE REFLEX: NORMAL

## 2019-07-22 ENCOUNTER — OFFICE VISIT (OUTPATIENT)
Dept: ONCOLOGY | Age: 84
End: 2019-07-22
Payer: MEDICARE

## 2019-07-22 ENCOUNTER — HOSPITAL ENCOUNTER (OUTPATIENT)
Dept: INFUSION THERAPY | Age: 84
Discharge: HOME | End: 2019-07-22
Payer: MEDICARE

## 2019-07-22 VITALS
DIASTOLIC BLOOD PRESSURE: 53 MMHG | HEART RATE: 80 BPM | OXYGEN SATURATION: 97 % | RESPIRATION RATE: 18 BRPM | HEIGHT: 67 IN | TEMPERATURE: 97.8 F | SYSTOLIC BLOOD PRESSURE: 99 MMHG | WEIGHT: 163 LBS | BODY MASS INDEX: 25.58 KG/M2

## 2019-07-22 DIAGNOSIS — C7B.8 METASTATIC MALIGNANT NEUROENDOCRINE TUMOR TO LIVER (HCC): Primary | ICD-10-CM

## 2019-07-22 DIAGNOSIS — G89.3 CHRONIC PAIN DUE TO NEOPLASM: ICD-10-CM

## 2019-07-22 DIAGNOSIS — Z51.81 ENCOUNTER FOR MONITORING OCTREOTIDE THERAPY: ICD-10-CM

## 2019-07-22 DIAGNOSIS — Z79.899 ENCOUNTER FOR MONITORING OCTREOTIDE THERAPY: ICD-10-CM

## 2019-07-22 DIAGNOSIS — D3A.00 CARCINOID TUMOR: ICD-10-CM

## 2019-07-22 DIAGNOSIS — D64.9 CHRONIC ANEMIA: ICD-10-CM

## 2019-07-22 DIAGNOSIS — C64.9 MALIGNANT NEOPLASM OF KIDNEY, UNSPECIFIED LATERALITY (HCC): ICD-10-CM

## 2019-07-22 DIAGNOSIS — D75.839 THROMBOCYTOSIS: ICD-10-CM

## 2019-07-22 LAB
ALBUMIN SERPL-MCNC: 3.3 G/DL (ref 3.5–5.1)
ALP BLD-CCNC: 232 U/L (ref 38–126)
ALT SERPL-CCNC: 14 U/L (ref 11–66)
AST SERPL-CCNC: 16 U/L (ref 5–40)
BILIRUB SERPL-MCNC: 0.2 MG/DL (ref 0.3–1.2)
BILIRUBIN DIRECT: < 0.2 MG/DL (ref 0–0.3)
BUN, WHOLE BLOOD: 9 MG/DL (ref 8–26)
CHLORIDE, WHOLE BLOOD: 96 MEQ/L (ref 98–109)
CREATININE, WHOLE BLOOD: 1 MG/DL (ref 0.5–1.2)
GFR, ESTIMATED ,CON: 75 ML/MIN/1.73M2
GLUCOSE, WHOLE BLOOD: 146 MG/DL (ref 70–108)
HCT VFR BLD CALC: 29.3 % (ref 42–52)
HEMOGLOBIN: 9.7 GM/DL (ref 14–18)
IONIZED CALCIUM, WHOLE BLOOD: 1.11 MMOL/L (ref 1.12–1.32)
MCH RBC QN AUTO: 27.6 PG (ref 27–31)
MCHC RBC AUTO-ENTMCNC: 33.2 GM/DL (ref 33–37)
MCV RBC AUTO: 83 FL (ref 80–94)
PDW BLD-RTO: 13 % (ref 11.5–14.5)
PLATELET # BLD: 402 THOU/MM3 (ref 130–400)
PMV BLD AUTO: 7 FL (ref 7.4–10.4)
POTASSIUM, WHOLE BLOOD: 4.1 MEQ/L (ref 3.5–4.9)
RBC # BLD: 3.53 MILL/MM3 (ref 4.7–6.1)
SEG NEUTROPHILS: 73 % (ref 43–75)
SEGMENTED NEUTROPHILS ABSOLUTE COUNT: 5.6 THOU/MM3 (ref 1.8–7.7)
SODIUM, WHOLE BLOOD: 134 MEQ/L (ref 138–146)
TOTAL CO2, WHOLE BLOOD: 28 MEQ/L (ref 23–33)
TOTAL PROTEIN: 6.1 G/DL (ref 6.1–8)
WBC # BLD: 7.7 THOU/MM3 (ref 4.8–10.8)

## 2019-07-22 PROCEDURE — 1123F ACP DISCUSS/DSCN MKR DOCD: CPT | Performed by: INTERNAL MEDICINE

## 2019-07-22 PROCEDURE — 1036F TOBACCO NON-USER: CPT | Performed by: INTERNAL MEDICINE

## 2019-07-22 PROCEDURE — 80047 BASIC METABLC PNL IONIZED CA: CPT

## 2019-07-22 PROCEDURE — G8598 ASA/ANTIPLAT THER USED: HCPCS | Performed by: INTERNAL MEDICINE

## 2019-07-22 PROCEDURE — 86316 IMMUNOASSAY TUMOR OTHER: CPT

## 2019-07-22 PROCEDURE — 99211 OFF/OP EST MAY X REQ PHY/QHP: CPT

## 2019-07-22 PROCEDURE — 85027 COMPLETE CBC AUTOMATED: CPT

## 2019-07-22 PROCEDURE — G8427 DOCREV CUR MEDS BY ELIG CLIN: HCPCS | Performed by: INTERNAL MEDICINE

## 2019-07-22 PROCEDURE — 80076 HEPATIC FUNCTION PANEL: CPT

## 2019-07-22 PROCEDURE — 4040F PNEUMOC VAC/ADMIN/RCVD: CPT | Performed by: INTERNAL MEDICINE

## 2019-07-22 PROCEDURE — G8417 CALC BMI ABV UP PARAM F/U: HCPCS | Performed by: INTERNAL MEDICINE

## 2019-07-22 PROCEDURE — 36415 COLL VENOUS BLD VENIPUNCTURE: CPT

## 2019-07-22 PROCEDURE — 99215 OFFICE O/P EST HI 40 MIN: CPT | Performed by: INTERNAL MEDICINE

## 2019-07-22 RX ORDER — OXYCODONE HYDROCHLORIDE 5 MG/1
5 TABLET ORAL
COMMUNITY
Start: 2019-07-22 | End: 2020-05-13 | Stop reason: SDUPTHER

## 2019-07-22 RX ORDER — PANTOPRAZOLE SODIUM 40 MG/1
TABLET, DELAYED RELEASE ORAL
Refills: 0 | COMMUNITY
Start: 2019-07-15 | End: 2019-10-09

## 2019-07-22 RX ORDER — CEFDINIR 300 MG/1
300 CAPSULE ORAL
COMMUNITY
End: 2019-08-08

## 2019-07-23 ENCOUNTER — TELEPHONE (OUTPATIENT)
Dept: UROLOGY | Age: 84
End: 2019-07-23

## 2019-07-23 NOTE — TELEPHONE ENCOUNTER
Patient called to move is 2 week follow up appt with Sirisha Small out to a month. He is having an exploratory surgery on Friday, there is cancer wrapped around his colon.

## 2019-07-24 ENCOUNTER — HOSPITAL ENCOUNTER (OUTPATIENT)
Dept: INFUSION THERAPY | Age: 84
Discharge: HOME | End: 2019-07-24
Payer: MEDICARE

## 2019-07-25 LAB
BLOOD CULTURE, ROUTINE: NORMAL
BLOOD CULTURE, ROUTINE: NORMAL

## 2019-07-27 LAB — CHROMOGRANIN A: 2558 NG/ML (ref 0–95)

## 2019-07-29 ENCOUNTER — TELEPHONE (OUTPATIENT)
Dept: UROLOGY | Age: 84
End: 2019-07-29

## 2019-07-29 RX ORDER — CEFDINIR 300 MG/1
300 CAPSULE ORAL 2 TIMES DAILY
Qty: 6 CAPSULE | Refills: 0 | Status: SHIPPED | OUTPATIENT
Start: 2019-07-29 | End: 2019-08-01

## 2019-07-29 NOTE — TELEPHONE ENCOUNTER
Pt. Calling back stating prescription needs sent to [facility] in [facility].

## 2019-07-29 NOTE — TELEPHONE ENCOUNTER
Does the cefdinir help? Looks like he has enough medication to get him through until Aug 5th.  [facility] sent 3 weeks worth on 7-15-19

## 2019-07-29 NOTE — TELEPHONE ENCOUNTER
He can't tell if it helped. He states sometimes he has had to take it 2-3 months before it clears up. The last antibiotic upset his stomach but didn't know the name of it. He would like enough refilled until his appointment on 08/08/2020. He has some left for this week. Please advise. Thank you.

## 2019-08-06 DIAGNOSIS — D64.9 CHRONIC ANEMIA: Primary | ICD-10-CM

## 2019-08-06 RX ORDER — SODIUM CHLORIDE 0.9 % (FLUSH) 0.9 %
10 SYRINGE (ML) INJECTION PRN
Status: CANCELLED | OUTPATIENT
Start: 2019-08-07

## 2019-08-06 RX ORDER — 0.9 % SODIUM CHLORIDE 0.9 %
10 VIAL (ML) INJECTION ONCE
Status: CANCELLED | OUTPATIENT
Start: 2019-08-07

## 2019-08-06 RX ORDER — HEPARIN SODIUM (PORCINE) LOCK FLUSH IV SOLN 100 UNIT/ML 100 UNIT/ML
500 SOLUTION INTRAVENOUS PRN
Status: CANCELLED | OUTPATIENT
Start: 2019-08-07

## 2019-08-06 RX ORDER — METHYLPREDNISOLONE SODIUM SUCCINATE 125 MG/2ML
125 INJECTION, POWDER, LYOPHILIZED, FOR SOLUTION INTRAMUSCULAR; INTRAVENOUS ONCE
Status: CANCELLED | OUTPATIENT
Start: 2019-08-07

## 2019-08-06 RX ORDER — DIPHENHYDRAMINE HYDROCHLORIDE 50 MG/ML
50 INJECTION INTRAMUSCULAR; INTRAVENOUS ONCE
Status: CANCELLED | OUTPATIENT
Start: 2019-08-07

## 2019-08-06 RX ORDER — SODIUM CHLORIDE 9 MG/ML
INJECTION, SOLUTION INTRAVENOUS CONTINUOUS
Status: CANCELLED | OUTPATIENT
Start: 2019-08-07

## 2019-08-07 ENCOUNTER — TELEPHONE (OUTPATIENT)
Dept: ONCOLOGY | Age: 84
End: 2019-08-07

## 2019-08-07 ENCOUNTER — OFFICE VISIT (OUTPATIENT)
Dept: ONCOLOGY | Age: 84
End: 2019-08-07
Payer: MEDICARE

## 2019-08-07 ENCOUNTER — HOSPITAL ENCOUNTER (OUTPATIENT)
Dept: INFUSION THERAPY | Age: 84
Discharge: HOME | End: 2019-08-07
Payer: MEDICARE

## 2019-08-07 VITALS
TEMPERATURE: 97.8 F | HEART RATE: 70 BPM | DIASTOLIC BLOOD PRESSURE: 60 MMHG | OXYGEN SATURATION: 100 % | RESPIRATION RATE: 18 BRPM | BODY MASS INDEX: 25.43 KG/M2 | HEIGHT: 67 IN | WEIGHT: 162 LBS | SYSTOLIC BLOOD PRESSURE: 120 MMHG

## 2019-08-07 VITALS
SYSTOLIC BLOOD PRESSURE: 128 MMHG | TEMPERATURE: 97.8 F | HEART RATE: 72 BPM | OXYGEN SATURATION: 100 % | DIASTOLIC BLOOD PRESSURE: 73 MMHG | RESPIRATION RATE: 18 BRPM

## 2019-08-07 DIAGNOSIS — D72.829 LEUKOCYTOSIS, UNSPECIFIED TYPE: ICD-10-CM

## 2019-08-07 DIAGNOSIS — C64.9 MALIGNANT NEOPLASM OF KIDNEY, UNSPECIFIED LATERALITY (HCC): ICD-10-CM

## 2019-08-07 DIAGNOSIS — D3A.00 CARCINOID TUMOR: Primary | ICD-10-CM

## 2019-08-07 DIAGNOSIS — D64.9 CHRONIC ANEMIA: Primary | ICD-10-CM

## 2019-08-07 DIAGNOSIS — Z79.899 ENCOUNTER FOR MONITORING OCTREOTIDE THERAPY: ICD-10-CM

## 2019-08-07 DIAGNOSIS — Z51.81 ENCOUNTER FOR MONITORING OCTREOTIDE THERAPY: ICD-10-CM

## 2019-08-07 DIAGNOSIS — D3A.00 CARCINOID TUMOR: ICD-10-CM

## 2019-08-07 DIAGNOSIS — D64.9 CHRONIC ANEMIA: ICD-10-CM

## 2019-08-07 DIAGNOSIS — C7B.8 METASTATIC MALIGNANT NEUROENDOCRINE TUMOR TO LIVER (HCC): ICD-10-CM

## 2019-08-07 LAB
ALBUMIN SERPL-MCNC: 3.6 G/DL (ref 3.5–5.1)
ALP BLD-CCNC: 193 U/L (ref 38–126)
ALT SERPL-CCNC: 13 U/L (ref 11–66)
AST SERPL-CCNC: 19 U/L (ref 5–40)
BASINOPHIL, AUTOMATED: 0 % (ref 0–3)
BILIRUB SERPL-MCNC: 0.3 MG/DL (ref 0.3–1.2)
BILIRUBIN DIRECT: < 0.2 MG/DL (ref 0–0.3)
BUN, WHOLE BLOOD: 11 MG/DL (ref 8–26)
CHLORIDE, WHOLE BLOOD: 101 MEQ/L (ref 98–109)
CREATININE, WHOLE BLOOD: 0.9 MG/DL (ref 0.5–1.2)
EOSINOPHILS RELATIVE PERCENT: 4 % (ref 0–4)
GFR, ESTIMATED ,CON: 85 ML/MIN/1.73M2
GLUCOSE, WHOLE BLOOD: 134 MG/DL (ref 70–108)
HCT VFR BLD CALC: 35.5 % (ref 42–52)
HEMOGLOBIN: 11.4 GM/DL (ref 14–18)
IONIZED CALCIUM, WHOLE BLOOD: 1.16 MMOL/L (ref 1.12–1.32)
LYMPHOCYTES # BLD: 21 % (ref 15–47)
MCH RBC QN AUTO: 27.3 PG (ref 27–31)
MCHC RBC AUTO-ENTMCNC: 32.2 GM/DL (ref 33–37)
MCV RBC AUTO: 85 FL (ref 80–94)
MONOCYTES: 5 % (ref 0–12)
PDW BLD-RTO: 14.4 % (ref 11.5–14.5)
PLATELET # BLD: 278 THOU/MM3 (ref 130–400)
PMV BLD AUTO: 8.2 FL (ref 7.4–10.4)
POTASSIUM, WHOLE BLOOD: 4.6 MEQ/L (ref 3.5–4.9)
RBC # BLD: 4.18 MILL/MM3 (ref 4.7–6.1)
SEG NEUTROPHILS: 70 % (ref 43–75)
SODIUM, WHOLE BLOOD: 137 MEQ/L (ref 138–146)
TOTAL CO2, WHOLE BLOOD: 25 MEQ/L (ref 23–33)
TOTAL PROTEIN: 6.7 G/DL (ref 6.1–8)
WBC # BLD: 11.7 THOU/MM3 (ref 4.8–10.8)

## 2019-08-07 PROCEDURE — 80076 HEPATIC FUNCTION PANEL: CPT

## 2019-08-07 PROCEDURE — 4040F PNEUMOC VAC/ADMIN/RCVD: CPT | Performed by: INTERNAL MEDICINE

## 2019-08-07 PROCEDURE — G8417 CALC BMI ABV UP PARAM F/U: HCPCS | Performed by: INTERNAL MEDICINE

## 2019-08-07 PROCEDURE — G8598 ASA/ANTIPLAT THER USED: HCPCS | Performed by: INTERNAL MEDICINE

## 2019-08-07 PROCEDURE — 85025 COMPLETE CBC W/AUTO DIFF WBC: CPT

## 2019-08-07 PROCEDURE — 1123F ACP DISCUSS/DSCN MKR DOCD: CPT | Performed by: INTERNAL MEDICINE

## 2019-08-07 PROCEDURE — 6360000002 HC RX W HCPCS: Performed by: INTERNAL MEDICINE

## 2019-08-07 PROCEDURE — 96372 THER/PROPH/DIAG INJ SC/IM: CPT

## 2019-08-07 PROCEDURE — 80047 BASIC METABLC PNL IONIZED CA: CPT

## 2019-08-07 PROCEDURE — 1036F TOBACCO NON-USER: CPT | Performed by: INTERNAL MEDICINE

## 2019-08-07 PROCEDURE — 99215 OFFICE O/P EST HI 40 MIN: CPT | Performed by: INTERNAL MEDICINE

## 2019-08-07 PROCEDURE — G8427 DOCREV CUR MEDS BY ELIG CLIN: HCPCS | Performed by: INTERNAL MEDICINE

## 2019-08-07 PROCEDURE — 99211 OFF/OP EST MAY X REQ PHY/QHP: CPT

## 2019-08-07 PROCEDURE — 36415 COLL VENOUS BLD VENIPUNCTURE: CPT

## 2019-08-07 RX ORDER — SUCRALFATE 1 G/1
1 TABLET ORAL
COMMUNITY
Start: 2019-07-25 | End: 2019-10-09

## 2019-08-07 RX ORDER — ESOMEPRAZOLE MAGNESIUM 40 MG/1
40 CAPSULE, DELAYED RELEASE ORAL
COMMUNITY
Start: 2019-07-25 | End: 2019-10-30

## 2019-08-07 RX ORDER — FERROUS SULFATE 325(65) MG
325 TABLET ORAL
COMMUNITY
Start: 2019-07-26 | End: 2019-10-09

## 2019-08-07 RX ORDER — DOCUSATE SODIUM 100 MG/1
100 CAPSULE, LIQUID FILLED ORAL
COMMUNITY
Start: 2019-07-25 | End: 2019-10-09

## 2019-08-07 RX ORDER — LANOLIN ALCOHOL/MO/W.PET/CERES
325 CREAM (GRAM) TOPICAL
Qty: 90 TABLET | Refills: 5 | Status: SHIPPED | OUTPATIENT
Start: 2019-08-07 | End: 2019-08-08

## 2019-08-07 RX ADMIN — OCTREOTIDE ACETATE 30 MG: KIT at 12:25

## 2019-08-08 ENCOUNTER — NURSE ONLY (OUTPATIENT)
Dept: UROLOGY | Age: 84
End: 2019-08-08
Payer: MEDICARE

## 2019-08-08 DIAGNOSIS — R35.0 URINARY FREQUENCY: Primary | ICD-10-CM

## 2019-08-08 LAB
BILIRUBIN, POC: NORMAL
BLOOD URINE, POC: NORMAL
CLARITY, POC: CLEAR
COLOR, POC: YELLOW
GLUCOSE URINE, POC: NORMAL
KETONES, POC: NORMAL
LEUKOCYTE EST, POC: NORMAL
NITRITE, POC: NEGATIVE
PH, POC: 5.5
POST VOID RESIDUAL (PVR): 126 ML
PROTEIN, POC: 100
SPECIFIC GRAVITY, POC: 1.01
UROBILINOGEN, POC: 0.2

## 2019-08-08 PROCEDURE — 99024 POSTOP FOLLOW-UP VISIT: CPT | Performed by: NURSE PRACTITIONER

## 2019-08-08 PROCEDURE — 81003 URINALYSIS AUTO W/O SCOPE: CPT | Performed by: NURSE PRACTITIONER

## 2019-08-08 PROCEDURE — 51798 US URINE CAPACITY MEASURE: CPT | Performed by: NURSE PRACTITIONER

## 2019-08-08 RX ORDER — CEFDINIR 300 MG/1
300 CAPSULE ORAL 2 TIMES DAILY
Qty: 28 CAPSULE | Refills: 0 | Status: SHIPPED | OUTPATIENT
Start: 2019-08-08 | End: 2019-08-12 | Stop reason: ALTCHOICE

## 2019-08-12 ENCOUNTER — TELEPHONE (OUTPATIENT)
Dept: UROLOGY | Age: 84
End: 2019-08-12

## 2019-08-12 RX ORDER — DOXYCYCLINE HYCLATE 100 MG/1
100 CAPSULE ORAL 2 TIMES DAILY
Qty: 56 CAPSULE | Refills: 0 | Status: SHIPPED | OUTPATIENT
Start: 2019-08-12 | End: 2019-09-09

## 2019-09-02 NOTE — PROGRESS NOTES
[facility] CANCER CENTER  CANCER NETWORK OF [facility]  [facility] [address] [facility]  [address] [facility] [address] [address]  Dept: [phone]  Dept Fax: [phone]  Loc: [phone]    Subjective:      Chief Complaint: Sanna Cuevas is a 80 y.o. male. The patient has been treated for malignant carcinoid syndrome. He was diagnosed in December of 2012 when he presented with abdominal pain. An abdominal CAT scan demonstrated intra-abdominal adenopathy and disease in the liver. He underwent a liver biopsy on November 11, 2012 which confirmed carcinoid tumor. An octreotide scan confirmed disease in the liver and abdominal lymph nodes. At the same time there was a mass noted in the left kidney that was concerning for a second primary malignancy. A CT directed biopsy was canceled by the radiologist and before an open biopsy could be scheduled, and the patient experienced a myocardial infarction. He had three stents placed and takes anti-platelet medication routinely. He subsequently developed problems with urinary retention and was found to have prostatic hypertrophy. He had a PSA of 7. He was advised that he was a candidate for a TURP but could not have this procedure done because of the myocardial infarction. A follow up abdominal CAT scan was completed on October 9, 2013. This demonstrated an increase in the size of lesions in the liver, abdominal lymph nodes and kidney. 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 Marychuy Levi developed an increase of right upper quadrant and mid abdominal pain in March, 2014. He was also having increasing episodes of diarrhea.  He was referred for an abdominal CAT scan completed on 3/11/2014 that demonstrated enlargement of all areas of hepatic metastasis compared to a CAT scan obtained on

## 2019-09-03 RX ORDER — METHYLPREDNISOLONE SODIUM SUCCINATE 125 MG/2ML
125 INJECTION, POWDER, LYOPHILIZED, FOR SOLUTION INTRAMUSCULAR; INTRAVENOUS ONCE
Status: CANCELLED | OUTPATIENT
Start: 2019-09-04

## 2019-09-03 RX ORDER — HEPARIN SODIUM (PORCINE) LOCK FLUSH IV SOLN 100 UNIT/ML 100 UNIT/ML
500 SOLUTION INTRAVENOUS PRN
Status: CANCELLED | OUTPATIENT
Start: 2019-09-04

## 2019-09-03 RX ORDER — DIPHENHYDRAMINE HYDROCHLORIDE 50 MG/ML
50 INJECTION INTRAMUSCULAR; INTRAVENOUS ONCE
Status: CANCELLED | OUTPATIENT
Start: 2019-09-04

## 2019-09-03 RX ORDER — 0.9 % SODIUM CHLORIDE 0.9 %
10 VIAL (ML) INJECTION ONCE
Status: CANCELLED | OUTPATIENT
Start: 2019-09-04

## 2019-09-03 RX ORDER — SODIUM CHLORIDE 0.9 % (FLUSH) 0.9 %
10 SYRINGE (ML) INJECTION PRN
Status: CANCELLED | OUTPATIENT
Start: 2019-09-04

## 2019-09-03 RX ORDER — SODIUM CHLORIDE 9 MG/ML
INJECTION, SOLUTION INTRAVENOUS CONTINUOUS
Status: CANCELLED | OUTPATIENT
Start: 2019-09-04

## 2019-09-04 ENCOUNTER — HOSPITAL ENCOUNTER (OUTPATIENT)
Dept: INFUSION THERAPY | Age: 84
Discharge: HOME | End: 2019-09-04
Payer: MEDICARE

## 2019-09-04 ENCOUNTER — OFFICE VISIT (OUTPATIENT)
Dept: ONCOLOGY | Age: 84
End: 2019-09-04
Payer: MEDICARE

## 2019-09-04 VITALS
RESPIRATION RATE: 18 BRPM | HEART RATE: 63 BPM | OXYGEN SATURATION: 100 % | BODY MASS INDEX: 25.25 KG/M2 | DIASTOLIC BLOOD PRESSURE: 56 MMHG | WEIGHT: 161.2 LBS | SYSTOLIC BLOOD PRESSURE: 137 MMHG | TEMPERATURE: 97.9 F

## 2019-09-04 VITALS
SYSTOLIC BLOOD PRESSURE: 98 MMHG | RESPIRATION RATE: 18 BRPM | OXYGEN SATURATION: 100 % | HEART RATE: 75 BPM | WEIGHT: 161.2 LBS | TEMPERATURE: 98.2 F | BODY MASS INDEX: 25.3 KG/M2 | HEIGHT: 67 IN | DIASTOLIC BLOOD PRESSURE: 62 MMHG

## 2019-09-04 DIAGNOSIS — D3A.00 CARCINOID TUMOR: Primary | ICD-10-CM

## 2019-09-04 DIAGNOSIS — C64.9 MALIGNANT NEOPLASM OF KIDNEY, UNSPECIFIED LATERALITY (HCC): ICD-10-CM

## 2019-09-04 DIAGNOSIS — Z51.81 ENCOUNTER FOR MONITORING OCTREOTIDE THERAPY: ICD-10-CM

## 2019-09-04 DIAGNOSIS — D64.9 CHRONIC ANEMIA: ICD-10-CM

## 2019-09-04 DIAGNOSIS — Z79.899 ENCOUNTER FOR MONITORING OCTREOTIDE THERAPY: ICD-10-CM

## 2019-09-04 LAB
ALBUMIN SERPL-MCNC: 3.5 G/DL (ref 3.5–5.1)
ALP BLD-CCNC: 195 U/L (ref 38–126)
ALT SERPL-CCNC: 18 U/L (ref 11–66)
AST SERPL-CCNC: 23 U/L (ref 5–40)
BASOPHILS # BLD: 0.8 %
BASOPHILS ABSOLUTE: 0.1 THOU/MM3 (ref 0–0.1)
BILIRUB SERPL-MCNC: 0.2 MG/DL (ref 0.3–1.2)
BILIRUBIN DIRECT: < 0.2 MG/DL (ref 0–0.3)
BUN, WHOLE BLOOD: 11 MG/DL (ref 8–26)
CHLORIDE, WHOLE BLOOD: 99 MEQ/L (ref 98–109)
CREATININE, WHOLE BLOOD: 1 MG/DL (ref 0.5–1.2)
EOSINOPHIL # BLD: 7.5 %
EOSINOPHILS ABSOLUTE: 0.5 THOU/MM3 (ref 0–0.4)
ERYTHROCYTE [DISTWIDTH] IN BLOOD BY AUTOMATED COUNT: 16.9 % (ref 11.5–14.5)
ERYTHROCYTE [DISTWIDTH] IN BLOOD BY AUTOMATED COUNT: 55.1 FL (ref 35–45)
GFR, ESTIMATED ,CON: 75 ML/MIN/1.73M2
GLUCOSE, WHOLE BLOOD: 99 MG/DL (ref 70–108)
HCT VFR BLD CALC: 39.3 % (ref 42–52)
HEMOGLOBIN: 11.8 GM/DL (ref 14–18)
IMMATURE GRANS (ABS): 0.02 THOU/MM3 (ref 0–0.07)
IMMATURE GRANULOCYTES: 0 %
IONIZED CALCIUM, WHOLE BLOOD: 1.15 MMOL/L (ref 1.12–1.32)
LYMPHOCYTES # BLD: 28.1 %
LYMPHOCYTES ABSOLUTE: 2 THOU/MM3 (ref 1–4.8)
MCH RBC QN AUTO: 26.8 PG (ref 26–33)
MCHC RBC AUTO-ENTMCNC: 30 GM/DL (ref 32.2–35.5)
MCV RBC AUTO: 89.1 FL (ref 80–94)
MONOCYTES # BLD: 8.4 %
MONOCYTES ABSOLUTE: 0.6 THOU/MM3 (ref 0.4–1.3)
NUCLEATED RED BLOOD CELLS: 0 /100 WBC
PLATELET # BLD: 247 THOU/MM3 (ref 130–400)
PMV BLD AUTO: 12.1 FL (ref 9.4–12.4)
POTASSIUM, WHOLE BLOOD: 4.4 MEQ/L (ref 3.5–4.9)
RBC # BLD: 4.41 MILL/MM3 (ref 4.7–6.1)
SEG NEUTROPHILS: 54.9 %
SEGMENTED NEUTROPHILS ABSOLUTE COUNT: 4 THOU/MM3 (ref 1.8–7.7)
SODIUM, WHOLE BLOOD: 138 MEQ/L (ref 138–146)
TOTAL CO2, WHOLE BLOOD: 26 MEQ/L (ref 23–33)
TOTAL PROTEIN: 6.4 G/DL (ref 6.1–8)
WBC # BLD: 7.2 THOU/MM3 (ref 4.8–10.8)

## 2019-09-04 PROCEDURE — 4040F PNEUMOC VAC/ADMIN/RCVD: CPT | Performed by: INTERNAL MEDICINE

## 2019-09-04 PROCEDURE — 80076 HEPATIC FUNCTION PANEL: CPT

## 2019-09-04 PROCEDURE — 80047 BASIC METABLC PNL IONIZED CA: CPT

## 2019-09-04 PROCEDURE — 96372 THER/PROPH/DIAG INJ SC/IM: CPT

## 2019-09-04 PROCEDURE — G8598 ASA/ANTIPLAT THER USED: HCPCS | Performed by: INTERNAL MEDICINE

## 2019-09-04 PROCEDURE — 99211 OFF/OP EST MAY X REQ PHY/QHP: CPT

## 2019-09-04 PROCEDURE — 36415 COLL VENOUS BLD VENIPUNCTURE: CPT

## 2019-09-04 PROCEDURE — 6360000002 HC RX W HCPCS: Performed by: INTERNAL MEDICINE

## 2019-09-04 PROCEDURE — G8417 CALC BMI ABV UP PARAM F/U: HCPCS | Performed by: INTERNAL MEDICINE

## 2019-09-04 PROCEDURE — 86316 IMMUNOASSAY TUMOR OTHER: CPT

## 2019-09-04 PROCEDURE — G8427 DOCREV CUR MEDS BY ELIG CLIN: HCPCS | Performed by: INTERNAL MEDICINE

## 2019-09-04 PROCEDURE — 1036F TOBACCO NON-USER: CPT | Performed by: INTERNAL MEDICINE

## 2019-09-04 PROCEDURE — 1123F ACP DISCUSS/DSCN MKR DOCD: CPT | Performed by: INTERNAL MEDICINE

## 2019-09-04 PROCEDURE — 85025 COMPLETE CBC W/AUTO DIFF WBC: CPT

## 2019-09-04 PROCEDURE — 99215 OFFICE O/P EST HI 40 MIN: CPT | Performed by: INTERNAL MEDICINE

## 2019-09-04 RX ADMIN — OCTREOTIDE ACETATE 30 MG: KIT at 11:18

## 2019-09-04 NOTE — PROGRESS NOTES
[facility] CANCER CENTER  CANCER NETWORK OF [facility]  [facility] [address] [facility]  [address], [facility] [address] [address]  Dept: [phone]  Dept Fax: [phone]  Loc: [phone]    Subjective:      Chief Complaint: Romy Velez is a 80 y.o. male. The patient has been treated for malignant carcinoid syndrome. He was diagnosed in December of 2012 when he presented with abdominal pain. An abdominal CAT scan demonstrated intra-abdominal adenopathy and disease in the liver. He underwent a liver biopsy on November 11, 2012 which confirmed carcinoid tumor. An octreotide scan confirmed disease in the liver and abdominal lymph nodes. At the same time there was a mass noted in the left kidney that was concerning for a second primary malignancy. A CT directed biopsy was canceled by the radiologist and before an open biopsy could be scheduled, and the patient experienced a myocardial infarction. He had three stents placed and takes anti-platelet medication routinely. He subsequently developed problems with urinary retention and was found to have prostatic hypertrophy. He had a PSA of 7. He was advised that he was a candidate for a TURP but could not have this procedure done because of the myocardial infarction. A follow up abdominal CAT scan was completed on October 9, 2013. This demonstrated an increase in the size of lesions in the liver, abdominal lymph nodes and kidney. 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 Naye Peterson developed an increase of right upper quadrant and mid abdominal pain in March, 2014. He was also having increasing episodes of diarrhea.  He was referred for an abdominal CAT scan completed on 3/11/2014 that demonstrated enlargement of all areas of hepatic metastasis compared to a CAT scan obtained on [facility]  Can[facility] Network of [address], [address], [address] [place] [address], [address] [place] [address] of the [facility] at [facility]      **This report has been created using voice recognition software. It may contain minor errors which are inherent in voice recognition technology. **          .

## 2019-09-04 NOTE — PLAN OF CARE
Care plan reviewed with patient. Patient verbalized understanding of the plan of care and contribute to goal setting. Problem: Intellectual/Education/Knowledge Deficit  Goal: Teaching initiated upon admission  Outcome: Met This Shift  Note:   Patient verbalizes understanding to verbal information given on sandostatin,action and possible side effects. Aware to call MD if develop complications. Intervention: Verbal/written education provided  Note:   Discuss sandostatin     Problem: Discharge Planning  Goal: Knowledge of discharge instructions  Description  Knowledge of discharge instructions     Outcome: Met This Shift  Note:   Verbalized understanding of discharge instructions, follow ups and when to call doctor   Intervention: Discharge to appropriate level of care  Note:   Discuss discharge instructions, follow ups and when to call doctor. Problem: Falls - Risk of:  Goal: Will remain free from falls  Description  Will remain free from falls  Outcome: Met This Shift  Note:   Free from falls while in infusion center.  Verbalized understanding of fall prevention and to ask for assistance with ambulation   Intervention: Assess risk factors for falls  Description  Assess risk factors for falls  Note:   Assess for fall risk, instruct to ask for assistance with ambulation

## 2019-09-09 LAB — CHROMOGRANIN A: 4660 NG/ML (ref 0–160)

## 2019-10-01 RX ORDER — 0.9 % SODIUM CHLORIDE 0.9 %
10 VIAL (ML) INJECTION ONCE
Status: CANCELLED | OUTPATIENT
Start: 2019-10-02

## 2019-10-01 RX ORDER — METHYLPREDNISOLONE SODIUM SUCCINATE 125 MG/2ML
125 INJECTION, POWDER, LYOPHILIZED, FOR SOLUTION INTRAMUSCULAR; INTRAVENOUS ONCE
Status: CANCELLED | OUTPATIENT
Start: 2019-10-02

## 2019-10-01 RX ORDER — HEPARIN SODIUM (PORCINE) LOCK FLUSH IV SOLN 100 UNIT/ML 100 UNIT/ML
500 SOLUTION INTRAVENOUS PRN
Status: CANCELLED | OUTPATIENT
Start: 2019-10-02

## 2019-10-01 RX ORDER — SODIUM CHLORIDE 9 MG/ML
INJECTION, SOLUTION INTRAVENOUS CONTINUOUS
Status: CANCELLED | OUTPATIENT
Start: 2019-10-02

## 2019-10-01 RX ORDER — SODIUM CHLORIDE 0.9 % (FLUSH) 0.9 %
10 SYRINGE (ML) INJECTION PRN
Status: CANCELLED | OUTPATIENT
Start: 2019-10-02

## 2019-10-01 RX ORDER — DIPHENHYDRAMINE HYDROCHLORIDE 50 MG/ML
50 INJECTION INTRAMUSCULAR; INTRAVENOUS ONCE
Status: CANCELLED | OUTPATIENT
Start: 2019-10-02

## 2019-10-02 ENCOUNTER — HOSPITAL ENCOUNTER (OUTPATIENT)
Dept: INFUSION THERAPY | Age: 84
Discharge: HOME | End: 2019-10-02
Payer: MEDICARE

## 2019-10-02 VITALS
RESPIRATION RATE: 18 BRPM | DIASTOLIC BLOOD PRESSURE: 56 MMHG | TEMPERATURE: 98 F | OXYGEN SATURATION: 100 % | WEIGHT: 159.4 LBS | HEIGHT: 67 IN | HEART RATE: 61 BPM | SYSTOLIC BLOOD PRESSURE: 102 MMHG | BODY MASS INDEX: 25.02 KG/M2

## 2019-10-02 DIAGNOSIS — C7A.00 MALIGNANT CARCINOID TUMOR, UNSPECIFIED SITE (HCC): ICD-10-CM

## 2019-10-02 DIAGNOSIS — D3A.00 CARCINOID TUMOR: Primary | ICD-10-CM

## 2019-10-02 LAB
ALBUMIN SERPL-MCNC: 3.3 G/DL (ref 3.5–5.1)
ALP BLD-CCNC: 239 U/L (ref 38–126)
ALT SERPL-CCNC: 27 U/L (ref 11–66)
AST SERPL-CCNC: 21 U/L (ref 5–40)
BILIRUB SERPL-MCNC: 0.3 MG/DL (ref 0.3–1.2)
BILIRUBIN DIRECT: < 0.2 MG/DL (ref 0–0.3)
BUN, WHOLE BLOOD: 14 MG/DL (ref 8–26)
CHLORIDE, WHOLE BLOOD: 100 MEQ/L (ref 98–109)
CREATININE, WHOLE BLOOD: 1.1 MG/DL (ref 0.5–1.2)
GFR, ESTIMATED ,CON: 67 ML/MIN/1.73M2
GLUCOSE, WHOLE BLOOD: 107 MG/DL (ref 70–108)
HCT VFR BLD CALC: 37.8 % (ref 42–52)
HEMOGLOBIN: 12.4 GM/DL (ref 14–18)
IONIZED CALCIUM, WHOLE BLOOD: 1.1 MMOL/L (ref 1.12–1.32)
MCH RBC QN AUTO: 27.1 PG (ref 27–31)
MCHC RBC AUTO-ENTMCNC: 32.8 GM/DL (ref 33–37)
MCV RBC AUTO: 83 FL (ref 80–94)
PDW BLD-RTO: 14.7 % (ref 11.5–14.5)
PLATELET # BLD: 291 THOU/MM3 (ref 130–400)
PMV BLD AUTO: 7.8 FL (ref 7.4–10.4)
POTASSIUM, WHOLE BLOOD: 4.3 MEQ/L (ref 3.5–4.9)
RBC # BLD: 4.57 MILL/MM3 (ref 4.7–6.1)
SEG NEUTROPHILS: 64 % (ref 43–75)
SEGMENTED NEUTROPHILS ABSOLUTE COUNT: 4.8 THOU/MM3 (ref 1.8–7.7)
SODIUM, WHOLE BLOOD: 137 MEQ/L (ref 138–146)
TOTAL CO2, WHOLE BLOOD: 27 MEQ/L (ref 23–33)
TOTAL PROTEIN: 6.3 G/DL (ref 6.1–8)
WBC # BLD: 7.5 THOU/MM3 (ref 4.8–10.8)

## 2019-10-02 PROCEDURE — 80076 HEPATIC FUNCTION PANEL: CPT

## 2019-10-02 PROCEDURE — 86316 IMMUNOASSAY TUMOR OTHER: CPT

## 2019-10-02 PROCEDURE — 85027 COMPLETE CBC AUTOMATED: CPT

## 2019-10-02 PROCEDURE — 36415 COLL VENOUS BLD VENIPUNCTURE: CPT

## 2019-10-02 PROCEDURE — 96372 THER/PROPH/DIAG INJ SC/IM: CPT

## 2019-10-02 PROCEDURE — 6360000002 HC RX W HCPCS: Performed by: INTERNAL MEDICINE

## 2019-10-02 PROCEDURE — 80047 BASIC METABLC PNL IONIZED CA: CPT

## 2019-10-02 RX ADMIN — OCTREOTIDE ACETATE 30 MG: KIT at 10:25

## 2019-10-02 ASSESSMENT — PAIN SCALES - GENERAL: PAINLEVEL_OUTOF10: 2

## 2019-10-02 ASSESSMENT — PAIN DESCRIPTION - ORIENTATION: ORIENTATION: RIGHT;LOWER

## 2019-10-02 ASSESSMENT — PAIN DESCRIPTION - PROGRESSION: CLINICAL_PROGRESSION: OTHER (COMMENT)

## 2019-10-02 ASSESSMENT — PAIN DESCRIPTION - PAIN TYPE: TYPE: CHRONIC PAIN

## 2019-10-02 ASSESSMENT — PAIN DESCRIPTION - ONSET: ONSET: ON-GOING

## 2019-10-02 ASSESSMENT — PAIN DESCRIPTION - FREQUENCY: FREQUENCY: CONTINUOUS

## 2019-10-02 ASSESSMENT — PAIN DESCRIPTION - DESCRIPTORS: DESCRIPTORS: ACHING;DISCOMFORT;DULL

## 2019-10-02 ASSESSMENT — PAIN DESCRIPTION - LOCATION: LOCATION: NECK

## 2019-10-02 NOTE — PLAN OF CARE
Care plan reviewed with patient. Patient verbalized understanding of the plan of care and contribute to goal setting. Problem: Pain:  Goal: Pain level will decrease  Description  Pain level will decrease  Outcome: Met This Shift  Goal: Control of acute pain  Description  Control of acute pain  Outcome: Met This Shift  Goal: Control of chronic pain  Description  Control of chronic pain  Outcome: Met This Shift     Problem: Intellectual/Education/Knowledge Deficit  Goal: Teaching initiated upon admission  Outcome: Met This Shift  Note:   Patient verbalizes understanding to verbal information given on sandostin,action and possible side effects. Aware to call MD if develop complications. Intervention: Verbal/written education provided  Note:   Discuss sandostatin     Problem: Discharge Planning  Goal: Knowledge of discharge instructions  Description  Knowledge of discharge instructions     Outcome: Met This Shift  Note:   Verbalized understanding of discharge instructions, follow ups and when to call doctor   Intervention: Discharge to appropriate level of care  Note:   Discuss discharge instructions, follow ups and when to call doctor. Problem: Falls - Risk of:  Goal: Will remain free from falls  Description  Will remain free from falls  Outcome: Met This Shift  Note:   Free from falls while in infusion center.  Verbalized understanding of fall prevention and to ask for assistance with ambulation   Intervention: Assess risk factors for falls  Description  Assess risk factors for falls  Note:   Assess for fall risk, instruct to ask for assistance with ambulation

## 2019-10-05 LAB — CHROMOGRANIN A: 4460 NG/ML (ref 0–160)

## 2019-10-09 ENCOUNTER — OFFICE VISIT (OUTPATIENT)
Dept: UROLOGY | Age: 84
End: 2019-10-09
Payer: MEDICARE

## 2019-10-09 VITALS
SYSTOLIC BLOOD PRESSURE: 132 MMHG | WEIGHT: 160 LBS | DIASTOLIC BLOOD PRESSURE: 64 MMHG | HEIGHT: 68 IN | BODY MASS INDEX: 24.25 KG/M2

## 2019-10-09 DIAGNOSIS — R33.9 INCOMPLETE BLADDER EMPTYING: Primary | ICD-10-CM

## 2019-10-09 LAB
BILIRUBIN, POC: NORMAL
BLOOD URINE, POC: NORMAL
CLARITY, POC: CLEAR
COLOR, POC: YELLOW
GLUCOSE URINE, POC: NORMAL
KETONES, POC: NORMAL
LEUKOCYTE EST, POC: NORMAL
NITRITE, POC: NORMAL
PH, POC: 6
POST VOID RESIDUAL (PVR): 102 ML
PROTEIN, POC: NORMAL
SPECIFIC GRAVITY, POC: 1.01
UROBILINOGEN, POC: 0.2

## 2019-10-09 PROCEDURE — 51798 US URINE CAPACITY MEASURE: CPT | Performed by: NURSE PRACTITIONER

## 2019-10-09 PROCEDURE — 4040F PNEUMOC VAC/ADMIN/RCVD: CPT | Performed by: NURSE PRACTITIONER

## 2019-10-09 PROCEDURE — 1123F ACP DISCUSS/DSCN MKR DOCD: CPT | Performed by: NURSE PRACTITIONER

## 2019-10-09 PROCEDURE — G8427 DOCREV CUR MEDS BY ELIG CLIN: HCPCS | Performed by: NURSE PRACTITIONER

## 2019-10-09 PROCEDURE — G8598 ASA/ANTIPLAT THER USED: HCPCS | Performed by: NURSE PRACTITIONER

## 2019-10-09 PROCEDURE — G8420 CALC BMI NORM PARAMETERS: HCPCS | Performed by: NURSE PRACTITIONER

## 2019-10-09 PROCEDURE — G8484 FLU IMMUNIZE NO ADMIN: HCPCS | Performed by: NURSE PRACTITIONER

## 2019-10-09 PROCEDURE — 1036F TOBACCO NON-USER: CPT | Performed by: NURSE PRACTITIONER

## 2019-10-09 PROCEDURE — 99213 OFFICE O/P EST LOW 20 MIN: CPT | Performed by: NURSE PRACTITIONER

## 2019-10-09 PROCEDURE — 81003 URINALYSIS AUTO W/O SCOPE: CPT | Performed by: NURSE PRACTITIONER

## 2019-10-10 ASSESSMENT — ENCOUNTER SYMPTOMS
BACK PAIN: 0
ABDOMINAL PAIN: 0

## 2019-10-28 DIAGNOSIS — D3A.00 CARCINOID TUMOR: ICD-10-CM

## 2019-10-30 ENCOUNTER — HOSPITAL ENCOUNTER (OUTPATIENT)
Dept: INFUSION THERAPY | Age: 84
Discharge: HOME | End: 2019-10-30
Payer: MEDICARE

## 2019-10-30 ENCOUNTER — OFFICE VISIT (OUTPATIENT)
Dept: ONCOLOGY | Age: 84
End: 2019-10-30
Payer: MEDICARE

## 2019-10-30 VITALS
SYSTOLIC BLOOD PRESSURE: 131 MMHG | HEART RATE: 66 BPM | DIASTOLIC BLOOD PRESSURE: 71 MMHG | TEMPERATURE: 97.6 F | OXYGEN SATURATION: 98 % | RESPIRATION RATE: 16 BRPM

## 2019-10-30 VITALS
WEIGHT: 162.8 LBS | OXYGEN SATURATION: 100 % | RESPIRATION RATE: 18 BRPM | BODY MASS INDEX: 24.67 KG/M2 | SYSTOLIC BLOOD PRESSURE: 127 MMHG | DIASTOLIC BLOOD PRESSURE: 71 MMHG | HEIGHT: 68 IN | TEMPERATURE: 97.6 F | HEART RATE: 69 BPM

## 2019-10-30 DIAGNOSIS — D64.9 CHRONIC ANEMIA: ICD-10-CM

## 2019-10-30 DIAGNOSIS — Z79.899 ENCOUNTER FOR MEDICATION MANAGEMENT: ICD-10-CM

## 2019-10-30 DIAGNOSIS — C7B.8 METASTATIC MALIGNANT NEUROENDOCRINE TUMOR TO LIVER (HCC): ICD-10-CM

## 2019-10-30 DIAGNOSIS — C7A.00 MALIGNANT CARCINOID TUMOR, UNSPECIFIED SITE (HCC): ICD-10-CM

## 2019-10-30 DIAGNOSIS — D3A.00 CARCINOID TUMOR: Primary | ICD-10-CM

## 2019-10-30 DIAGNOSIS — C7A.00 MALIGNANT CARCINOID TUMOR, UNSPECIFIED SITE (HCC): Primary | ICD-10-CM

## 2019-10-30 LAB
ALBUMIN SERPL-MCNC: 3.6 G/DL (ref 3.5–5.1)
ALP BLD-CCNC: 202 U/L (ref 38–126)
ALT SERPL-CCNC: 18 U/L (ref 11–66)
AST SERPL-CCNC: 17 U/L (ref 5–40)
BILIRUB SERPL-MCNC: 0.3 MG/DL (ref 0.3–1.2)
BILIRUBIN DIRECT: < 0.2 MG/DL (ref 0–0.3)
BUN, WHOLE BLOOD: 13 MG/DL (ref 8–26)
CHLORIDE, WHOLE BLOOD: 101 MEQ/L (ref 98–109)
CREATININE, WHOLE BLOOD: 1.1 MG/DL (ref 0.5–1.2)
GFR, ESTIMATED ,CON: 67 ML/MIN/1.73M2
GLUCOSE, WHOLE BLOOD: 108 MG/DL (ref 70–108)
HCT VFR BLD CALC: 37.7 % (ref 42–52)
HEMOGLOBIN: 12.3 GM/DL (ref 14–18)
IONIZED CALCIUM, WHOLE BLOOD: 1.14 MMOL/L (ref 1.12–1.32)
MCH RBC QN AUTO: 26.5 PG (ref 27–31)
MCHC RBC AUTO-ENTMCNC: 32.6 GM/DL (ref 33–37)
MCV RBC AUTO: 81 FL (ref 80–94)
PDW BLD-RTO: 15.8 % (ref 11.5–14.5)
PLATELET # BLD: 203 THOU/MM3 (ref 130–400)
PMV BLD AUTO: 9 FL (ref 7.4–10.4)
POTASSIUM, WHOLE BLOOD: 4.5 MEQ/L (ref 3.5–4.9)
RBC # BLD: 4.63 MILL/MM3 (ref 4.7–6.1)
SEG NEUTROPHILS: 54 % (ref 43–75)
SEGMENTED NEUTROPHILS ABSOLUTE COUNT: 4 THOU/MM3 (ref 1.8–7.7)
SODIUM, WHOLE BLOOD: 138 MEQ/L (ref 138–146)
TOTAL CO2, WHOLE BLOOD: 28 MEQ/L (ref 23–33)
TOTAL PROTEIN: 6.5 G/DL (ref 6.1–8)
WBC # BLD: 7.4 THOU/MM3 (ref 4.8–10.8)

## 2019-10-30 PROCEDURE — G8598 ASA/ANTIPLAT THER USED: HCPCS | Performed by: INTERNAL MEDICINE

## 2019-10-30 PROCEDURE — 36415 COLL VENOUS BLD VENIPUNCTURE: CPT

## 2019-10-30 PROCEDURE — 80047 BASIC METABLC PNL IONIZED CA: CPT

## 2019-10-30 PROCEDURE — G8427 DOCREV CUR MEDS BY ELIG CLIN: HCPCS | Performed by: INTERNAL MEDICINE

## 2019-10-30 PROCEDURE — G8417 CALC BMI ABV UP PARAM F/U: HCPCS | Performed by: INTERNAL MEDICINE

## 2019-10-30 PROCEDURE — 80076 HEPATIC FUNCTION PANEL: CPT

## 2019-10-30 PROCEDURE — 4040F PNEUMOC VAC/ADMIN/RCVD: CPT | Performed by: INTERNAL MEDICINE

## 2019-10-30 PROCEDURE — 1036F TOBACCO NON-USER: CPT | Performed by: INTERNAL MEDICINE

## 2019-10-30 PROCEDURE — 99215 OFFICE O/P EST HI 40 MIN: CPT | Performed by: INTERNAL MEDICINE

## 2019-10-30 PROCEDURE — 99211 OFF/OP EST MAY X REQ PHY/QHP: CPT

## 2019-10-30 PROCEDURE — 1123F ACP DISCUSS/DSCN MKR DOCD: CPT | Performed by: INTERNAL MEDICINE

## 2019-10-30 PROCEDURE — 85027 COMPLETE CBC AUTOMATED: CPT

## 2019-10-30 PROCEDURE — 96372 THER/PROPH/DIAG INJ SC/IM: CPT

## 2019-10-30 PROCEDURE — G8484 FLU IMMUNIZE NO ADMIN: HCPCS | Performed by: INTERNAL MEDICINE

## 2019-10-30 PROCEDURE — 6360000002 HC RX W HCPCS: Performed by: INTERNAL MEDICINE

## 2019-10-30 RX ORDER — METHYLPREDNISOLONE SODIUM SUCCINATE 125 MG/2ML
125 INJECTION, POWDER, LYOPHILIZED, FOR SOLUTION INTRAMUSCULAR; INTRAVENOUS ONCE
Status: CANCELLED | OUTPATIENT
Start: 2019-10-30

## 2019-10-30 RX ORDER — DIPHENHYDRAMINE HYDROCHLORIDE 50 MG/ML
50 INJECTION INTRAMUSCULAR; INTRAVENOUS ONCE
Status: CANCELLED | OUTPATIENT
Start: 2019-10-30

## 2019-10-30 RX ORDER — PANTOPRAZOLE SODIUM 40 MG/1
TABLET, DELAYED RELEASE ORAL
COMMUNITY
Start: 2019-10-17

## 2019-10-30 RX ORDER — HEPARIN SODIUM (PORCINE) LOCK FLUSH IV SOLN 100 UNIT/ML 100 UNIT/ML
500 SOLUTION INTRAVENOUS PRN
Status: CANCELLED | OUTPATIENT
Start: 2019-10-30

## 2019-10-30 RX ORDER — 0.9 % SODIUM CHLORIDE 0.9 %
10 VIAL (ML) INJECTION ONCE
Status: CANCELLED | OUTPATIENT
Start: 2019-10-30

## 2019-10-30 RX ORDER — SODIUM CHLORIDE 0.9 % (FLUSH) 0.9 %
10 SYRINGE (ML) INJECTION PRN
Status: CANCELLED | OUTPATIENT
Start: 2019-10-30

## 2019-10-30 RX ORDER — SODIUM CHLORIDE 9 MG/ML
INJECTION, SOLUTION INTRAVENOUS CONTINUOUS
Status: CANCELLED | OUTPATIENT
Start: 2019-10-30

## 2019-10-30 RX ADMIN — OCTREOTIDE ACETATE 30 MG: KIT at 11:21

## 2019-10-30 NOTE — PLAN OF CARE
Problem: Musculor/Skeletal Functional Status  Goal: Absence of falls  Outcome: Met This Shift  Note:   Patient verbalizes understanding of fall precautions. Patient free from falls this visit. Intervention: Fall precautions  Note:   Discussed fall precautions, call light within reach. Problem: Intellectual/Education/Knowledge Deficit  Goal: Teaching initiated upon admission  Outcome: Met This Shift  Note:   Patient verbalizes understanding to verbal information given on sandostatin,action and possible side effects. Aware to call MD if develop complications. Intervention: Verbal/written education provided  Note:   Discussed sandostatin action, possible side effects and when to call the doctor. Problem: Discharge Planning  Goal: Knowledge of discharge instructions  Description  Knowledge of discharge instructions     Outcome: Met This Shift  Note:   Verbalized understanding of discharge instructions, follow-up appointments, and when to call the physician. Intervention: Discharge to appropriate level of care  Note:   Discuss discharge instructions, follow ups, and when to call the doctor. Care plan reviewed with patient. Patient verbalizes understanding of the plan of care and contribute to goal setting.

## 2019-11-22 RX ORDER — HEPARIN SODIUM (PORCINE) LOCK FLUSH IV SOLN 100 UNIT/ML 100 UNIT/ML
500 SOLUTION INTRAVENOUS PRN
Status: CANCELLED | OUTPATIENT
Start: 2019-11-27

## 2019-11-22 RX ORDER — SODIUM CHLORIDE 9 MG/ML
INJECTION, SOLUTION INTRAVENOUS CONTINUOUS
Status: CANCELLED | OUTPATIENT
Start: 2019-11-27

## 2019-11-22 RX ORDER — DIPHENHYDRAMINE HYDROCHLORIDE 50 MG/ML
50 INJECTION INTRAMUSCULAR; INTRAVENOUS ONCE
Status: CANCELLED | OUTPATIENT
Start: 2019-11-27

## 2019-11-22 RX ORDER — METHYLPREDNISOLONE SODIUM SUCCINATE 125 MG/2ML
125 INJECTION, POWDER, LYOPHILIZED, FOR SOLUTION INTRAMUSCULAR; INTRAVENOUS ONCE
Status: CANCELLED | OUTPATIENT
Start: 2019-11-27

## 2019-11-22 RX ORDER — SODIUM CHLORIDE 0.9 % (FLUSH) 0.9 %
10 SYRINGE (ML) INJECTION PRN
Status: CANCELLED | OUTPATIENT
Start: 2019-11-27

## 2019-11-22 RX ORDER — 0.9 % SODIUM CHLORIDE 0.9 %
10 VIAL (ML) INJECTION ONCE
Status: CANCELLED | OUTPATIENT
Start: 2019-11-27

## 2019-11-27 ENCOUNTER — HOSPITAL ENCOUNTER (OUTPATIENT)
Dept: INFUSION THERAPY | Age: 84
Discharge: HOME | End: 2019-11-27
Payer: MEDICARE

## 2019-11-27 VITALS
TEMPERATURE: 98.2 F | WEIGHT: 163.8 LBS | RESPIRATION RATE: 16 BRPM | DIASTOLIC BLOOD PRESSURE: 62 MMHG | BODY MASS INDEX: 25.28 KG/M2 | SYSTOLIC BLOOD PRESSURE: 134 MMHG | OXYGEN SATURATION: 99 % | HEART RATE: 61 BPM

## 2019-11-27 DIAGNOSIS — C7A.00 MALIGNANT CARCINOID TUMOR, UNSPECIFIED SITE (HCC): Primary | ICD-10-CM

## 2019-11-27 LAB
ALBUMIN SERPL-MCNC: 3.6 G/DL (ref 3.5–5.1)
ALP BLD-CCNC: 203 U/L (ref 38–126)
ALT SERPL-CCNC: 20 U/L (ref 11–66)
AST SERPL-CCNC: 21 U/L (ref 5–40)
BILIRUB SERPL-MCNC: 0.5 MG/DL (ref 0.3–1.2)
BILIRUBIN DIRECT: < 0.2 MG/DL (ref 0–0.3)
BUN, WHOLE BLOOD: 13 MG/DL (ref 8–26)
CHLORIDE, WHOLE BLOOD: 101 MEQ/L (ref 98–109)
CREATININE, WHOLE BLOOD: 1.1 MG/DL (ref 0.5–1.2)
GFR, ESTIMATED ,CON: 67 ML/MIN/1.73M2
GLUCOSE, WHOLE BLOOD: 97 MG/DL (ref 70–108)
HCT VFR BLD CALC: 38.3 % (ref 42–52)
HEMOGLOBIN: 12.6 GM/DL (ref 14–18)
IONIZED CALCIUM, WHOLE BLOOD: 1.06 MMOL/L (ref 1.12–1.32)
MCH RBC QN AUTO: 27.4 PG (ref 27–31)
MCHC RBC AUTO-ENTMCNC: 32.8 GM/DL (ref 33–37)
MCV RBC AUTO: 83 FL (ref 80–94)
PDW BLD-RTO: 15.4 % (ref 11.5–14.5)
PLATELET # BLD: 242 THOU/MM3 (ref 130–400)
PMV BLD AUTO: 8.7 FL (ref 7.4–10.4)
POTASSIUM, WHOLE BLOOD: 4 MEQ/L (ref 3.5–4.9)
RBC # BLD: 4.59 MILL/MM3 (ref 4.7–6.1)
SEG NEUTROPHILS: 55 % (ref 43–75)
SEGMENTED NEUTROPHILS ABSOLUTE COUNT: 4 THOU/MM3 (ref 1.8–7.7)
SODIUM, WHOLE BLOOD: 137 MEQ/L (ref 138–146)
TOTAL CO2, WHOLE BLOOD: 28 MEQ/L (ref 23–33)
TOTAL PROTEIN: 6.7 G/DL (ref 6.1–8)
WBC # BLD: 7.3 THOU/MM3 (ref 4.8–10.8)

## 2019-11-27 PROCEDURE — 80076 HEPATIC FUNCTION PANEL: CPT

## 2019-11-27 PROCEDURE — 6360000002 HC RX W HCPCS: Performed by: INTERNAL MEDICINE

## 2019-11-27 PROCEDURE — 36415 COLL VENOUS BLD VENIPUNCTURE: CPT

## 2019-11-27 PROCEDURE — 80047 BASIC METABLC PNL IONIZED CA: CPT

## 2019-11-27 PROCEDURE — 85027 COMPLETE CBC AUTOMATED: CPT

## 2019-11-27 PROCEDURE — 96372 THER/PROPH/DIAG INJ SC/IM: CPT

## 2019-11-27 RX ADMIN — OCTREOTIDE ACETATE 30 MG: KIT at 09:54

## 2019-12-20 RX ORDER — SODIUM CHLORIDE 0.9 % (FLUSH) 0.9 %
10 SYRINGE (ML) INJECTION PRN
Status: CANCELLED | OUTPATIENT
Start: 2019-12-23

## 2019-12-20 RX ORDER — HEPARIN SODIUM (PORCINE) LOCK FLUSH IV SOLN 100 UNIT/ML 100 UNIT/ML
500 SOLUTION INTRAVENOUS PRN
Status: CANCELLED | OUTPATIENT
Start: 2019-12-23

## 2019-12-20 RX ORDER — SODIUM CHLORIDE 9 MG/ML
INJECTION, SOLUTION INTRAVENOUS CONTINUOUS
Status: CANCELLED | OUTPATIENT
Start: 2019-12-23

## 2019-12-20 RX ORDER — 0.9 % SODIUM CHLORIDE 0.9 %
10 VIAL (ML) INJECTION ONCE
Status: CANCELLED | OUTPATIENT
Start: 2019-12-23

## 2019-12-20 RX ORDER — METHYLPREDNISOLONE SODIUM SUCCINATE 125 MG/2ML
125 INJECTION, POWDER, LYOPHILIZED, FOR SOLUTION INTRAMUSCULAR; INTRAVENOUS ONCE
Status: CANCELLED | OUTPATIENT
Start: 2019-12-23

## 2019-12-20 RX ORDER — DIPHENHYDRAMINE HYDROCHLORIDE 50 MG/ML
50 INJECTION INTRAMUSCULAR; INTRAVENOUS ONCE
Status: CANCELLED | OUTPATIENT
Start: 2019-12-23

## 2019-12-23 ENCOUNTER — HOSPITAL ENCOUNTER (OUTPATIENT)
Dept: INFUSION THERAPY | Age: 84
Discharge: HOME | End: 2019-12-23
Payer: MEDICARE

## 2019-12-23 VITALS
BODY MASS INDEX: 25.15 KG/M2 | HEART RATE: 64 BPM | TEMPERATURE: 98.4 F | RESPIRATION RATE: 16 BRPM | OXYGEN SATURATION: 99 % | WEIGHT: 163 LBS | SYSTOLIC BLOOD PRESSURE: 146 MMHG | DIASTOLIC BLOOD PRESSURE: 65 MMHG

## 2019-12-23 DIAGNOSIS — C7A.00 MALIGNANT CARCINOID TUMOR, UNSPECIFIED SITE (HCC): Primary | ICD-10-CM

## 2019-12-23 LAB
ALBUMIN SERPL-MCNC: 3.7 G/DL (ref 3.5–5.1)
ALP BLD-CCNC: 184 U/L (ref 38–126)
ALT SERPL-CCNC: 16 U/L (ref 11–66)
AST SERPL-CCNC: 18 U/L (ref 5–40)
BILIRUB SERPL-MCNC: 0.5 MG/DL (ref 0.3–1.2)
BILIRUBIN DIRECT: < 0.2 MG/DL (ref 0–0.3)
BUN, WHOLE BLOOD: 11 MG/DL (ref 8–26)
CHLORIDE, WHOLE BLOOD: 102 MEQ/L (ref 98–109)
CREATININE, WHOLE BLOOD: 1 MG/DL (ref 0.5–1.2)
GFR, ESTIMATED ,CON: 75 ML/MIN/1.73M2
GLUCOSE, WHOLE BLOOD: 105 MG/DL (ref 70–108)
HCT VFR BLD CALC: 36.7 % (ref 42–52)
HEMOGLOBIN: 12.1 GM/DL (ref 14–18)
IONIZED CALCIUM, WHOLE BLOOD: 1.13 MMOL/L (ref 1.12–1.32)
MCH RBC QN AUTO: 28.3 PG (ref 27–31)
MCHC RBC AUTO-ENTMCNC: 32.9 GM/DL (ref 33–37)
MCV RBC AUTO: 86 FL (ref 80–94)
PDW BLD-RTO: 18.1 % (ref 11.5–14.5)
PLATELET # BLD: 232 THOU/MM3 (ref 130–400)
PMV BLD AUTO: 9.1 FL (ref 7.4–10.4)
POTASSIUM, WHOLE BLOOD: 4.3 MEQ/L (ref 3.5–4.9)
RBC # BLD: 4.26 MILL/MM3 (ref 4.7–6.1)
SEG NEUTROPHILS: 59 % (ref 43–75)
SEGMENTED NEUTROPHILS ABSOLUTE COUNT: 4.7 THOU/MM3 (ref 1.8–7.7)
SODIUM, WHOLE BLOOD: 138 MEQ/L (ref 138–146)
TOTAL CO2, WHOLE BLOOD: 27 MEQ/L (ref 23–33)
TOTAL PROTEIN: 6.8 G/DL (ref 6.1–8)
WBC # BLD: 8 THOU/MM3 (ref 4.8–10.8)

## 2019-12-23 PROCEDURE — 80076 HEPATIC FUNCTION PANEL: CPT

## 2019-12-23 PROCEDURE — 85027 COMPLETE CBC AUTOMATED: CPT

## 2019-12-23 PROCEDURE — 36415 COLL VENOUS BLD VENIPUNCTURE: CPT

## 2019-12-23 PROCEDURE — 96372 THER/PROPH/DIAG INJ SC/IM: CPT

## 2019-12-23 PROCEDURE — 6360000002 HC RX W HCPCS: Performed by: INTERNAL MEDICINE

## 2019-12-23 PROCEDURE — 80047 BASIC METABLC PNL IONIZED CA: CPT

## 2019-12-23 RX ADMIN — OCTREOTIDE ACETATE 30 MG: KIT at 10:40

## 2019-12-23 ASSESSMENT — PAIN SCALES - GENERAL: PAINLEVEL_OUTOF10: 0

## 2019-12-23 NOTE — PLAN OF CARE
Problem: Musculor/Skeletal Functional Status  Goal: Absence of falls  12/23/2019 1550 by Tram Merida RN  Outcome: Met This Shift  12/23/2019 1530 by Tram Merida RN  Outcome: Met This Shift  Note:   No falls this admission   Intervention: Fall precautions  12/23/2019 1550 by Tram Merida RN  Note:   Patient aware of fall precautions for here and at home -call light in reach while here    12/23/2019 1530 by Tram Merida RN  Note:   Patient aware of fall precautions for here and at home -call light in reach while here       Problem: Intellectual/Education/Knowledge Deficit  Goal: Teaching initiated upon admission  12/23/2019 1550 by Tram Merida RN  Outcome: Met This Shift  Note:   Reviewed Neupogen with patient, patient offered no questions or concerns. Patient verbalized understanding of drug being administered. 12/23/2019 1530 by Tram Merida RN  Outcome: Met This Shift  Note:   Reviewed rusty Dash with patient, patient offered no questions or concerns. Patient verbalized understanding of drug being administered. Goal: Written Disposition Instruction form completed  12/23/2019 1550 by Tram Merida RN  Outcome: Met This Shift  Note:   Discharge instructions given and reviewed with patient. All questions answered. Patient verbalized understanding   12/23/2019 1530 by Tram Merida RN  Outcome: Met This Shift  Intervention: Verbal/written education provided  Note:   Discharge instruction sheets     Problem: Discharge Planning  Goal: Knowledge of discharge instructions  Description  Knowledge of discharge instructions     12/23/2019 1550 by Tram Merida RN  Outcome: Met This Shift  Note:   Patient able to teach back follow up appointments and when to call the doctor.  Patient offers no questions at this time   12/23/2019 1530 by Tram Merida RN  Outcome: Met This Shift  Intervention: Interaction with patient/family and care team  Note:   Patient and family currently denies any needs or concerns    Intervention: Discharge to appropriate level of care  Note:   Discharge instruction sheets   Care plan reviewed with patient and he verbalized understanding of the plan of care and contributed to goal setting.

## 2019-12-23 NOTE — PROGRESS NOTES
Pt discharged in stable condition with verbalization of discharge instructions all questions answered and all  belongings sent with patient. Patient tolerated sandostatin without any complications or signs of a reaction.

## 2020-01-21 RX ORDER — HEPARIN SODIUM (PORCINE) LOCK FLUSH IV SOLN 100 UNIT/ML 100 UNIT/ML
500 SOLUTION INTRAVENOUS PRN
Status: CANCELLED | OUTPATIENT
Start: 2020-01-22

## 2020-01-21 RX ORDER — 0.9 % SODIUM CHLORIDE 0.9 %
10 VIAL (ML) INJECTION ONCE
Status: CANCELLED | OUTPATIENT
Start: 2020-01-22

## 2020-01-21 RX ORDER — METHYLPREDNISOLONE SODIUM SUCCINATE 125 MG/2ML
125 INJECTION, POWDER, LYOPHILIZED, FOR SOLUTION INTRAMUSCULAR; INTRAVENOUS ONCE
Status: CANCELLED | OUTPATIENT
Start: 2020-01-22

## 2020-01-21 RX ORDER — DIPHENHYDRAMINE HYDROCHLORIDE 50 MG/ML
50 INJECTION INTRAMUSCULAR; INTRAVENOUS ONCE
Status: CANCELLED | OUTPATIENT
Start: 2020-01-22

## 2020-01-21 RX ORDER — SODIUM CHLORIDE 0.9 % (FLUSH) 0.9 %
10 SYRINGE (ML) INJECTION PRN
Status: CANCELLED | OUTPATIENT
Start: 2020-01-22

## 2020-01-21 RX ORDER — SODIUM CHLORIDE 9 MG/ML
INJECTION, SOLUTION INTRAVENOUS CONTINUOUS
Status: CANCELLED | OUTPATIENT
Start: 2020-01-22

## 2020-01-22 ENCOUNTER — HOSPITAL ENCOUNTER (OUTPATIENT)
Dept: INFUSION THERAPY | Age: 85
Discharge: HOME | End: 2020-01-22
Payer: MEDICARE

## 2020-01-22 ENCOUNTER — OFFICE VISIT (OUTPATIENT)
Dept: ONCOLOGY | Age: 85
End: 2020-01-22
Payer: MEDICARE

## 2020-01-22 VITALS
BODY MASS INDEX: 24.83 KG/M2 | RESPIRATION RATE: 20 BRPM | TEMPERATURE: 97.5 F | HEIGHT: 68 IN | DIASTOLIC BLOOD PRESSURE: 67 MMHG | OXYGEN SATURATION: 98 % | WEIGHT: 163.8 LBS | SYSTOLIC BLOOD PRESSURE: 117 MMHG | HEART RATE: 68 BPM

## 2020-01-22 VITALS
TEMPERATURE: 97.5 F | RESPIRATION RATE: 20 BRPM | OXYGEN SATURATION: 98 % | DIASTOLIC BLOOD PRESSURE: 67 MMHG | HEART RATE: 68 BPM | SYSTOLIC BLOOD PRESSURE: 117 MMHG

## 2020-01-22 DIAGNOSIS — C7A.00 MALIGNANT CARCINOID TUMOR, UNSPECIFIED SITE (HCC): Primary | ICD-10-CM

## 2020-01-22 DIAGNOSIS — C7A.00 MALIGNANT CARCINOID TUMOR, UNSPECIFIED SITE (HCC): ICD-10-CM

## 2020-01-22 LAB
ALBUMIN SERPL-MCNC: 3.5 G/DL (ref 3.5–5.1)
ALP BLD-CCNC: 209 U/L (ref 38–126)
ALT SERPL-CCNC: 23 U/L (ref 11–66)
AST SERPL-CCNC: 22 U/L (ref 5–40)
BILIRUB SERPL-MCNC: 0.5 MG/DL (ref 0.3–1.2)
BILIRUBIN DIRECT: < 0.2 MG/DL (ref 0–0.3)
BUN, WHOLE BLOOD: 15 MG/DL (ref 8–26)
CHLORIDE, WHOLE BLOOD: 99 MEQ/L (ref 98–109)
CREATININE, WHOLE BLOOD: 1 MG/DL (ref 0.5–1.2)
GFR, ESTIMATED ,CON: 75 ML/MIN/1.73M2
GLUCOSE, WHOLE BLOOD: 103 MG/DL (ref 70–108)
HCT VFR BLD CALC: 39.1 % (ref 42–52)
HEMOGLOBIN: 12.7 GM/DL (ref 14–18)
IONIZED CALCIUM, WHOLE BLOOD: 1.13 MMOL/L (ref 1.12–1.32)
MCH RBC QN AUTO: 28.7 PG (ref 27–31)
MCHC RBC AUTO-ENTMCNC: 32.5 GM/DL (ref 33–37)
MCV RBC AUTO: 88 FL (ref 80–94)
PDW BLD-RTO: 14.8 % (ref 11.5–14.5)
PLATELET # BLD: 212 THOU/MM3 (ref 130–400)
PMV BLD AUTO: 9 FL (ref 7.4–10.4)
POTASSIUM, WHOLE BLOOD: 4.5 MEQ/L (ref 3.5–4.9)
RBC # BLD: 4.42 MILL/MM3 (ref 4.7–6.1)
SEG NEUTROPHILS: 56 % (ref 43–75)
SEGMENTED NEUTROPHILS ABSOLUTE COUNT: 4.8 THOU/MM3 (ref 1.8–7.7)
SODIUM, WHOLE BLOOD: 136 MEQ/L (ref 138–146)
TOTAL CO2, WHOLE BLOOD: 27 MEQ/L (ref 23–33)
TOTAL PROTEIN: 6.8 G/DL (ref 6.1–8)
WBC # BLD: 8.5 THOU/MM3 (ref 4.8–10.8)

## 2020-01-22 PROCEDURE — 86316 IMMUNOASSAY TUMOR OTHER: CPT

## 2020-01-22 PROCEDURE — G8427 DOCREV CUR MEDS BY ELIG CLIN: HCPCS | Performed by: INTERNAL MEDICINE

## 2020-01-22 PROCEDURE — 4040F PNEUMOC VAC/ADMIN/RCVD: CPT | Performed by: INTERNAL MEDICINE

## 2020-01-22 PROCEDURE — 1123F ACP DISCUSS/DSCN MKR DOCD: CPT | Performed by: INTERNAL MEDICINE

## 2020-01-22 PROCEDURE — 96372 THER/PROPH/DIAG INJ SC/IM: CPT

## 2020-01-22 PROCEDURE — 99211 OFF/OP EST MAY X REQ PHY/QHP: CPT

## 2020-01-22 PROCEDURE — 85027 COMPLETE CBC AUTOMATED: CPT

## 2020-01-22 PROCEDURE — 1036F TOBACCO NON-USER: CPT | Performed by: INTERNAL MEDICINE

## 2020-01-22 PROCEDURE — 80047 BASIC METABLC PNL IONIZED CA: CPT

## 2020-01-22 PROCEDURE — 99215 OFFICE O/P EST HI 40 MIN: CPT | Performed by: INTERNAL MEDICINE

## 2020-01-22 PROCEDURE — G8484 FLU IMMUNIZE NO ADMIN: HCPCS | Performed by: INTERNAL MEDICINE

## 2020-01-22 PROCEDURE — 36415 COLL VENOUS BLD VENIPUNCTURE: CPT

## 2020-01-22 PROCEDURE — G8417 CALC BMI ABV UP PARAM F/U: HCPCS | Performed by: INTERNAL MEDICINE

## 2020-01-22 PROCEDURE — 80076 HEPATIC FUNCTION PANEL: CPT

## 2020-01-22 PROCEDURE — 6360000002 HC RX W HCPCS: Performed by: INTERNAL MEDICINE

## 2020-01-22 RX ORDER — M-VIT,TX,IRON,MINS/CALC/FOLIC 27MG-0.4MG
1 TABLET ORAL DAILY
COMMUNITY

## 2020-01-22 RX ADMIN — OCTREOTIDE ACETATE 30 MG: KIT at 11:39

## 2020-01-22 NOTE — PATIENT INSTRUCTIONS
1.  Octreotide today. 2.  Octreotide and labs on 02/19/2020 and 03/18/2020.  3.  CT scans prior to RTC. 4.  Labs on RTC. 5.  Ocreotide on RTC.

## 2020-01-22 NOTE — PROGRESS NOTES
Pt discharged in stable condition with verbalization of discharge instructions all questions answered and all  belongings sent with patient. Patient tolerated sandostatin injection  without any complications or signs of a reaction.

## 2020-01-23 ENCOUNTER — OFFICE VISIT (OUTPATIENT)
Dept: UROLOGY | Age: 85
End: 2020-01-23
Payer: MEDICARE

## 2020-01-23 VITALS
SYSTOLIC BLOOD PRESSURE: 112 MMHG | HEIGHT: 68 IN | DIASTOLIC BLOOD PRESSURE: 58 MMHG | BODY MASS INDEX: 24.75 KG/M2 | WEIGHT: 163.3 LBS

## 2020-01-23 LAB
BILIRUBIN, POC: NORMAL
BLOOD URINE, POC: NORMAL
CLARITY, POC: CLEAR
COLOR, POC: YELLOW
GLUCOSE URINE, POC: NORMAL
KETONES, POC: NORMAL
LEUKOCYTE EST, POC: NORMAL
NITRITE, POC: NORMAL
PH, POC: 5.5
POST VOID RESIDUAL (PVR): 40 ML
PROTEIN, POC: NORMAL
SPECIFIC GRAVITY, POC: 1.01
UROBILINOGEN, POC: 0.2

## 2020-01-23 PROCEDURE — G8427 DOCREV CUR MEDS BY ELIG CLIN: HCPCS | Performed by: NURSE PRACTITIONER

## 2020-01-23 PROCEDURE — G8484 FLU IMMUNIZE NO ADMIN: HCPCS | Performed by: NURSE PRACTITIONER

## 2020-01-23 PROCEDURE — 4040F PNEUMOC VAC/ADMIN/RCVD: CPT | Performed by: NURSE PRACTITIONER

## 2020-01-23 PROCEDURE — 1123F ACP DISCUSS/DSCN MKR DOCD: CPT | Performed by: NURSE PRACTITIONER

## 2020-01-23 PROCEDURE — 81003 URINALYSIS AUTO W/O SCOPE: CPT | Performed by: NURSE PRACTITIONER

## 2020-01-23 PROCEDURE — G8417 CALC BMI ABV UP PARAM F/U: HCPCS | Performed by: NURSE PRACTITIONER

## 2020-01-23 PROCEDURE — 99213 OFFICE O/P EST LOW 20 MIN: CPT | Performed by: NURSE PRACTITIONER

## 2020-01-23 PROCEDURE — 1036F TOBACCO NON-USER: CPT | Performed by: NURSE PRACTITIONER

## 2020-01-23 PROCEDURE — 51798 US URINE CAPACITY MEASURE: CPT | Performed by: NURSE PRACTITIONER

## 2020-01-23 RX ORDER — DOXYCYCLINE HYCLATE 100 MG
100 TABLET ORAL 2 TIMES DAILY
Qty: 28 TABLET | Refills: 0 | Status: SHIPPED | OUTPATIENT
Start: 2020-01-23 | End: 2020-02-06

## 2020-01-23 ASSESSMENT — ENCOUNTER SYMPTOMS
BACK PAIN: 0
ABDOMINAL PAIN: 0

## 2020-01-24 NOTE — PROGRESS NOTES
history of Cancer [facility]), Carcinoid tumor, Coronary artery disease, Heart attack (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 [address] Past Surgical History  The patient  has a past surgical history that includes Neck surgery; shoulder surgery (2001); Coronary angioplasty with stent; back surgery; Cardiac surgery (2001); joint replacement (2010); eye surgery; Cataract removal; Pilonidal cyst excision; partial nephrectomy (Left, 12/18/13); other surgical history (07/2017); other surgical history (02/28/2018); TURP (07/02/2019); and TURP (N/A, 7/2/2019). Family History  This patient's family history includes Heart Disease in his father. Social History  [place]  reports that he has never smoked. He has never used smokeless tobacco. He reports that he does not drink alcohol or use drugs. Subjective:      Review of Systems   Constitutional: Negative for activity change, appetite change, chills, diaphoresis, fatigue, fever and unexpected weight change. Gastrointestinal: Negative for abdominal pain. Genitourinary: Negative for decreased urine volume, difficulty urinating, dysuria, flank pain, frequency, hematuria and urgency. Musculoskeletal: Negative for back pain. Objective:   BP (!) 112/58   Ht 5' 7.5\" (1.715 m)   Wt 163 lb 4.8 oz (74.1 kg)   BMI 25.20 kg/m²     Physical Exam  Constitutional:       General: He is not in acute distress. Appearance: He is well-developed. He is not diaphoretic. Eyes:      General:         Right eye: No discharge. Left eye: No discharge. Neck:      Vascular: No JVD. Trachea: No tracheal deviation. Cardiovascular:      Rate and Rhythm: Normal rate and regular rhythm. Heart sounds: Normal heart sounds. Pulmonary:      Effort: Pulmonary effort is normal. No respiratory distress.       Breath sounds: Normal breath sounds. Abdominal:      General: Bowel sounds are normal. There is no distension. Palpations: Abdomen is soft. Skin:     General: Skin is warm and dry. Neurological:      Mental Status: He is alert and oriented to person, place, and time. POC  Results for POC orders placed in visit on 01/23/20   POCT Urinalysis No Micro (Auto)   Result Value Ref Range    Color, UA yellow     Clarity, UA clear     Glucose, UA POC neg     Bilirubin, UA neg     Ketones, UA neg     Spec Grav, UA 1.010     Blood, UA POC trace     pH, UA 5.5     Protein, UA POC neg     Urobilinogen, UA 0.2     Leukocytes, UA neg     Nitrite, UA neg    poct post void residual   Result Value Ref Range    post void residual 40 ml       Assessment:   BPH with obstruction  prostatitis  Hx of RCC    Plan:     Send urine for culture (call result) and start doxycycline empirically for possible prostatitis.

## 2020-01-25 LAB — CHROMOGRANIN A: 5360 NG/ML (ref 0–160)

## 2020-01-27 ENCOUNTER — TELEPHONE (OUTPATIENT)
Dept: UROLOGY | Age: 85
End: 2020-01-27

## 2020-01-27 RX ORDER — CIPROFLOXACIN 500 MG/1
500 TABLET, FILM COATED ORAL 2 TIMES DAILY
Qty: 28 TABLET | Refills: 0 | Status: SHIPPED | OUTPATIENT
Start: 2020-01-27 | End: 2020-02-10

## 2020-01-27 NOTE — TELEPHONE ENCOUNTER
Patient returned the call and was advised urine culture grew 2 organisms. Stop doxycycline and start cipro. He voiced understanding.

## 2020-01-27 NOTE — TELEPHONE ENCOUNTER
Attempted to call the patient regarding the message below. Voicemail left to return the call to the office.

## 2020-01-31 NOTE — PROGRESS NOTES
[facility]ER [facility]ENTER  [facility] OF [facility]  [facility] [address] [facility] [address]  [address] [facility] [address]  [address]  Dept: [phone]  Dept Fax: [phone]  Loc: [phone]    Subjective:      Chief Complaint: Tasia Serrato is a 80 y.o. male. The patient has been treated for malignant carcinoid syndrome. He was diagnosed in December of 2012 when he presented with abdominal pain. An abdominal CAT scan demonstrated intra-abdominal adenopathy and disease in the liver. He underwent a liver biopsy on November 11, 2012 which confirmed carcinoid tumor. An octreotide scan confirmed disease in the liver and abdominal lymph nodes. At the same time there was a mass noted in the left kidney that was concerning for a second primary malignancy. A CT directed biopsy was canceled by the radiologist and before an open biopsy could be scheduled, and the patient experienced a myocardial infarction. He had three stents placed and takes anti-platelet medication routinely. He subsequently developed problems with urinary retention and was found to have prostatic hypertrophy. He had a PSA of 7. He was advised that he was a candidate for a TURP but could not have this procedure done because of the myocardial infarction. A follow up abdominal CAT scan was completed on October 9, 2013. This demonstrated an increase in the size of lesions in the liver, abdominal lymph nodes and kidney. 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 Chetan Kwok developed an increase of right upper quadrant and mid abdominal pain in March, 2014. He was also having increasing episodes of diarrhea.  He was referred for an abdominal CAT scan completed on 3/11/2014 that demonstrated enlargement of all areas of hepatic metastasis compared to a CAT scan obtained on 10/9/2013. There was noted to be stable BPH. There was an area of \"inflammatory reaction\" extending off the tail of the pancreas along the inferior aspect of the spleen. The tail of the pancreas revealed minimal edema. The possibility of pancreatitis was suggested. There was noted to be change in the kidney interpreted as pyelonephritis. However the radiologist did not make mention of the recent partial nephrectomy which could also be the explanation for the other described findings. He was started on Octreotide on 03/12/2014. In 2018, the patient underwent two TACE sutures at [facility]. HPI:   Savannah Gomez returns today for follow-up regarding his history of metastatic carcinoid malignancy. He does have liver involvement of his malignancy but fortunately his liver function test have remained stable and within normal range. The patient continues take octreotide therapy monthly which he also tolerates well. He has no significant side effects or toxicity related to octreotide therapy. His chief complaint on his review of systems today is fatigue. He denies abdominal pain, change in bowel habits, or change in bladder habits. The patient has had previous mild peripheral neuropathy. This is remained stable. He is not experiencing diarrhea. ECOG performance status is level 0-1. The patient is here today for follow-up regarding his history of a metastatic carcinoid malignancy. His general sense of well-being has remained stable. A previous bowel obstruction that was initiated this past summer appears to have resolved and he has no specific gastrointestinal symptoms on today's evaluation. He did have a CT scan completed on October 23, 2019. Overall, this appears to be a relatively stable CT scan. There was slight progression of malignancy in the liver and mild regression of lymphadenopathy in the mesenteric region. But overall the tumor burden was approximately the same as previous CT scans. I recommended a continued pattern of surveillance and treatment with Sandostatin. The patient does experience some mild peripheral neuropathy from therapy. He has not had fever or other signs of infection. The patient generally states that his bowel and bladder habits have been stable. He is not having any significant diarrhea. He does have chronic fatigue which she relates to his age. ECOG performance status is 0-1. PMH, SH, and FH:  I reviewed the PMH, SH and FH as noted on the electronic medical record. There have been no changes as noted in the previous documentation. Review of Systems   Constitutional: Fatigue. HENT: Negative. Eyes: Negative. Respiratory: Negative. Cardiovascular: Negative. Gastrointestinal: Negative. Genitourinary: Negative. Musculoskeletal: Negative. Skin: Negative. Neurological: General weakness, neuropathy. Hematological: Negative. Psychiatric/Behavioral: Negative. Objective:   Physical Exam   Vitals:    01/22/20 1018   BP: 117/67   Pulse: 68   Resp: 20   Temp: 97.5 °F (36.4 °C)   SpO2: 98%   Vitals reviewed and are stable. Constitutional: Elderly, frail. No acute distress. HENT: Normocephalic and atraumatic. Oral mucosa clear. Eyes: Pupils are equal and reactive. No scleral icterus. Neck: Bilateral appearance is symmetrical. No identifiable masses. Pulmonary: Effort normal. No respiratory distress. No rhonchi, rales or wheezing. Cardiovascular: Regular rate and rhythm normal S1 and S2. Abdominal: Soft. Bowel sounds present. No hepatomegaly or splenomegaly. Musculoskeletal: Gait is abnormal. Muscle strength and tone decreased in all four extremities. Neurological: Alert and oriented to person, place, and time. Judgment and thought content normal.  Skin: Scattered ecchymosis noted on bilateral upper forearms. Psychiatric: Mood and affect appropriate for the clinical situation.  Behavior is normal.     Data Analysis:    Hematology 1/22/2020 12/23/2019 11/27/2019   WBC 8.5 8.0 7.3   RBC 4.42 (L) 4.26 (L) 4.59 (L)   HGB 12.7 (L) 12.1 (L) 12.6 (L)   HCT 39.1 (L) 36.7 (L) 38.3 (L)   MCV 88 86 83   RDW 14.8 (H) 18.1 (H) 15.4 (H)    232 242     Assessment:   1. Carcinoid malignancy. 2.  Renal cell carcinoma. 3.  Therapy with Octreotide. 4.  Chronic anemia. Plan:   1. Continue therapy with octreotide today. 2.  Monitor for progression of malignancy. 3.  Monitor hemoglobin hematocrit and for any evidence of blood loss. 4.  Monitor for toxicity of octreotide therapy. Karla Penaloza M.D. Medical Director: [facility]  Cancer [facility]  [address] [facility] [address], [address] [place] [address], [address] [place] [address], [address] [place] [address] of the [facility]      **This report has been created using voice recognition software. It may contain minor errors which are inherent in voice recognition technology. **          .

## 2020-02-18 RX ORDER — HEPARIN SODIUM (PORCINE) LOCK FLUSH IV SOLN 100 UNIT/ML 100 UNIT/ML
500 SOLUTION INTRAVENOUS PRN
Status: CANCELLED | OUTPATIENT
Start: 2020-02-19

## 2020-02-18 RX ORDER — METHYLPREDNISOLONE SODIUM SUCCINATE 125 MG/2ML
125 INJECTION, POWDER, LYOPHILIZED, FOR SOLUTION INTRAMUSCULAR; INTRAVENOUS ONCE
Status: CANCELLED | OUTPATIENT
Start: 2020-02-19

## 2020-02-18 RX ORDER — DIPHENHYDRAMINE HYDROCHLORIDE 50 MG/ML
50 INJECTION INTRAMUSCULAR; INTRAVENOUS ONCE
Status: CANCELLED | OUTPATIENT
Start: 2020-02-19

## 2020-02-18 RX ORDER — SODIUM CHLORIDE 0.9 % (FLUSH) 0.9 %
10 SYRINGE (ML) INJECTION PRN
Status: CANCELLED | OUTPATIENT
Start: 2020-02-19

## 2020-02-18 RX ORDER — 0.9 % SODIUM CHLORIDE 0.9 %
10 VIAL (ML) INJECTION ONCE
Status: CANCELLED | OUTPATIENT
Start: 2020-02-19

## 2020-02-18 RX ORDER — SODIUM CHLORIDE 9 MG/ML
INJECTION, SOLUTION INTRAVENOUS CONTINUOUS
Status: CANCELLED | OUTPATIENT
Start: 2020-02-19

## 2020-02-19 ENCOUNTER — TELEPHONE (OUTPATIENT)
Dept: ONCOLOGY | Age: 85
End: 2020-02-19

## 2020-02-19 ENCOUNTER — HOSPITAL ENCOUNTER (OUTPATIENT)
Dept: INFUSION THERAPY | Age: 85
Discharge: HOME | End: 2020-02-19
Payer: MEDICARE

## 2020-02-19 VITALS
BODY MASS INDEX: 24.38 KG/M2 | RESPIRATION RATE: 18 BRPM | SYSTOLIC BLOOD PRESSURE: 121 MMHG | HEART RATE: 56 BPM | WEIGHT: 158 LBS | OXYGEN SATURATION: 98 % | TEMPERATURE: 97.8 F | DIASTOLIC BLOOD PRESSURE: 59 MMHG

## 2020-02-19 DIAGNOSIS — C7A.00 MALIGNANT CARCINOID TUMOR, UNSPECIFIED SITE (HCC): Primary | ICD-10-CM

## 2020-02-19 LAB
ALBUMIN SERPL-MCNC: 3.6 G/DL (ref 3.5–5.1)
ALP BLD-CCNC: 210 U/L (ref 38–126)
ALT SERPL-CCNC: 21 U/L (ref 11–66)
AST SERPL-CCNC: 28 U/L (ref 5–40)
BILIRUB SERPL-MCNC: 0.4 MG/DL (ref 0.3–1.2)
BILIRUBIN DIRECT: < 0.2 MG/DL (ref 0–0.3)
BUN BLDV-MCNC: 15 MG/DL (ref 7–22)
CHLORIDE, WHOLE BLOOD: 100 MEQ/L (ref 98–109)
CO2: 27 MEQ/L (ref 23–33)
CREATININE, WHOLE BLOOD: 1.3 MG/DL (ref 0.5–1.2)
ERYTHROCYTE [DISTWIDTH] IN BLOOD BY AUTOMATED COUNT: 15.4 % (ref 11.5–14.5)
ERYTHROCYTE [DISTWIDTH] IN BLOOD BY AUTOMATED COUNT: 50.2 FL (ref 35–45)
GFR, ESTIMATED ,CON: 55 ML/MIN/1.73M2
GLUCOSE, WHOLE BLOOD: 103 MG/DL (ref 70–108)
HCT VFR BLD CALC: 41.8 % (ref 42–52)
HEMOGLOBIN: 13 GM/DL (ref 14–18)
IONIZED CALCIUM, WHOLE BLOOD: 1.15 MMOL/L (ref 1.12–1.32)
MCH RBC QN AUTO: 27.7 PG (ref 26–33)
MCHC RBC AUTO-ENTMCNC: 31.1 GM/DL (ref 32.2–35.5)
MCV RBC AUTO: 88.9 FL (ref 80–94)
PLATELET # BLD: 206 THOU/MM3 (ref 130–400)
PMV BLD AUTO: 12.6 FL (ref 9.4–12.4)
POTASSIUM, WHOLE BLOOD: 4.6 MEQ/L (ref 3.5–4.9)
RBC # BLD: 4.7 MILL/MM3 (ref 4.7–6.1)
SEGMENTED NEUTROPHILS ABSOLUTE COUNT: 4.2 THOU/MM3 (ref 1.8–7.7)
SODIUM, WHOLE BLOOD: 139 MEQ/L (ref 138–146)
TOTAL PROTEIN: 6.9 G/DL (ref 6.1–8)
WBC # BLD: 7.1 THOU/MM3 (ref 4.8–10.8)

## 2020-02-19 PROCEDURE — 36415 COLL VENOUS BLD VENIPUNCTURE: CPT

## 2020-02-19 PROCEDURE — 80047 BASIC METABLC PNL IONIZED CA: CPT

## 2020-02-19 PROCEDURE — 82374 ASSAY BLOOD CARBON DIOXIDE: CPT

## 2020-02-19 PROCEDURE — 85027 COMPLETE CBC AUTOMATED: CPT

## 2020-02-19 PROCEDURE — 80076 HEPATIC FUNCTION PANEL: CPT

## 2020-02-19 PROCEDURE — 84520 ASSAY OF UREA NITROGEN: CPT

## 2020-02-19 PROCEDURE — 96372 THER/PROPH/DIAG INJ SC/IM: CPT

## 2020-02-19 PROCEDURE — 6360000002 HC RX W HCPCS: Performed by: INTERNAL MEDICINE

## 2020-02-19 PROCEDURE — 99211 OFF/OP EST MAY X REQ PHY/QHP: CPT

## 2020-02-19 RX ORDER — DIPHENOXYLATE HYDROCHLORIDE AND ATROPINE SULFATE 2.5; .025 MG/1; MG/1
1 TABLET ORAL 4 TIMES DAILY PRN
Qty: 60 TABLET | Refills: 2 | Status: SHIPPED | OUTPATIENT
Start: 2020-02-19 | End: 2020-03-18 | Stop reason: SDUPTHER

## 2020-02-19 RX ADMIN — OCTREOTIDE ACETATE 30 MG: KIT at 11:03

## 2020-02-19 ASSESSMENT — PAIN SCALES - GENERAL: PAINLEVEL_OUTOF10: 0

## 2020-02-19 NOTE — PLAN OF CARE
Problem: Musculor/Skeletal Functional Status  Goal: Absence of falls  Outcome: Met This Shift  Note:   No falls this admission   Intervention: Fall precautions  Note:   Patient aware of fall precautions for here and at home -call light in reach while here       Problem: Intellectual/Education/Knowledge Deficit  Goal: Teaching initiated upon admission  Outcome: Met This Shift  Note:   Reviewed sandostatin lar with patient, patient offered no questions or concerns. Patient verbalized understanding of drug being administered. Goal: Written Disposition Instruction form completed  Outcome: Met This Shift  Note:   Discharge instructions given and reviewed with patient. All questions answered. Patient verbalized understanding   Intervention: Verbal/written education provided  Note:   Discharge instruction sheets     Problem: Discharge Planning  Goal: Knowledge of discharge instructions  Description  Knowledge of discharge instructions     Outcome: Met This Shift  Note:   Patient able to teach back follow up appointments and when to call the doctor. Patient offers no questions at this time   Intervention: Interaction with patient/family and care team  Note:   Patient has concerns about diarrhea -lomotil ordered and is to call if doesn't help  Intervention: Discharge to appropriate level of care  Note:   Discharge home   Care plan reviewed with patient and he verbalized understanding of the plan of care and contributed to goal setting.

## 2020-02-19 NOTE — PROGRESS NOTES
Patient instructed that doctor Greta Kamara will be calling in a prescription for lomotil and that he is to call if that doesn't control his diarrhea and they may have to move up his scans and appointment.  Patient verbalized understanding

## 2020-02-19 NOTE — PROGRESS NOTES
Pt discharged in stable condition with verbalization of discharge instructions all questions answered and all  belongings sent with patient. Patient tolerated Sandostatin LAR injection without any complications or signs of a reaction.

## 2020-03-17 RX ORDER — HEPARIN SODIUM (PORCINE) LOCK FLUSH IV SOLN 100 UNIT/ML 100 UNIT/ML
500 SOLUTION INTRAVENOUS PRN
Status: CANCELLED | OUTPATIENT
Start: 2020-03-18

## 2020-03-17 RX ORDER — 0.9 % SODIUM CHLORIDE 0.9 %
10 VIAL (ML) INJECTION ONCE
Status: CANCELLED | OUTPATIENT
Start: 2020-03-18

## 2020-03-17 RX ORDER — DIPHENHYDRAMINE HYDROCHLORIDE 50 MG/ML
50 INJECTION INTRAMUSCULAR; INTRAVENOUS ONCE
Status: CANCELLED | OUTPATIENT
Start: 2020-03-18

## 2020-03-17 RX ORDER — METHYLPREDNISOLONE SODIUM SUCCINATE 125 MG/2ML
125 INJECTION, POWDER, LYOPHILIZED, FOR SOLUTION INTRAMUSCULAR; INTRAVENOUS ONCE
Status: CANCELLED | OUTPATIENT
Start: 2020-03-18

## 2020-03-17 RX ORDER — SODIUM CHLORIDE 0.9 % (FLUSH) 0.9 %
10 SYRINGE (ML) INJECTION PRN
Status: CANCELLED | OUTPATIENT
Start: 2020-03-18

## 2020-03-17 RX ORDER — SODIUM CHLORIDE 9 MG/ML
INJECTION, SOLUTION INTRAVENOUS CONTINUOUS
Status: CANCELLED | OUTPATIENT
Start: 2020-03-18

## 2020-03-18 ENCOUNTER — HOSPITAL ENCOUNTER (OUTPATIENT)
Dept: INFUSION THERAPY | Age: 85
Discharge: HOME | End: 2020-03-18
Payer: MEDICARE

## 2020-03-18 VITALS
OXYGEN SATURATION: 97 % | DIASTOLIC BLOOD PRESSURE: 60 MMHG | SYSTOLIC BLOOD PRESSURE: 128 MMHG | WEIGHT: 161.2 LBS | RESPIRATION RATE: 18 BRPM | HEART RATE: 64 BPM | BODY MASS INDEX: 24.87 KG/M2 | TEMPERATURE: 97.5 F

## 2020-03-18 DIAGNOSIS — C7A.00 MALIGNANT CARCINOID TUMOR, UNSPECIFIED SITE (HCC): Primary | ICD-10-CM

## 2020-03-18 LAB
ALBUMIN SERPL-MCNC: 3.8 G/DL (ref 3.5–5.1)
ALP BLD-CCNC: 206 U/L (ref 38–126)
ALT SERPL-CCNC: 18 U/L (ref 11–66)
AST SERPL-CCNC: 20 U/L (ref 5–40)
BILIRUB SERPL-MCNC: 0.5 MG/DL (ref 0.3–1.2)
BILIRUBIN DIRECT: < 0.2 MG/DL (ref 0–0.3)
BUN BLDV-MCNC: 14 MG/DL (ref 7–22)
CHLORIDE, WHOLE BLOOD: 99 MEQ/L (ref 98–109)
CO2: 28 MEQ/L (ref 23–33)
CREATININE, WHOLE BLOOD: 1.1 MG/DL (ref 0.5–1.2)
GFR, ESTIMATED ,CON: 67 ML/MIN/1.73M2
GLUCOSE, WHOLE BLOOD: 108 MG/DL (ref 70–108)
HCT VFR BLD CALC: 40.4 % (ref 42–52)
HEMOGLOBIN: 13 GM/DL (ref 14–18)
IONIZED CALCIUM, WHOLE BLOOD: 1.13 MMOL/L (ref 1.12–1.32)
MCH RBC QN AUTO: 28.2 PG (ref 27–31)
MCHC RBC AUTO-ENTMCNC: 32.3 GM/DL (ref 33–37)
MCV RBC AUTO: 87 FL (ref 80–94)
PDW BLD-RTO: 13.8 % (ref 11.5–14.5)
PLATELET # BLD: 204 THOU/MM3 (ref 130–400)
PMV BLD AUTO: 9.3 FL (ref 7.4–10.4)
POTASSIUM, WHOLE BLOOD: 4.4 MEQ/L (ref 3.5–4.9)
RBC # BLD: 4.63 MILL/MM3 (ref 4.7–6.1)
SEG NEUTROPHILS: 59 % (ref 43–75)
SEGMENTED NEUTROPHILS ABSOLUTE COUNT: 5.4 THOU/MM3 (ref 1.8–7.7)
SODIUM, WHOLE BLOOD: 139 MEQ/L (ref 138–146)
TOTAL PROTEIN: 6.9 G/DL (ref 6.1–8)
WBC # BLD: 9.1 THOU/MM3 (ref 4.8–10.8)

## 2020-03-18 PROCEDURE — 86316 IMMUNOASSAY TUMOR OTHER: CPT

## 2020-03-18 PROCEDURE — 84520 ASSAY OF UREA NITROGEN: CPT

## 2020-03-18 PROCEDURE — 82374 ASSAY BLOOD CARBON DIOXIDE: CPT

## 2020-03-18 PROCEDURE — 96372 THER/PROPH/DIAG INJ SC/IM: CPT

## 2020-03-18 PROCEDURE — 85027 COMPLETE CBC AUTOMATED: CPT

## 2020-03-18 PROCEDURE — 80047 BASIC METABLC PNL IONIZED CA: CPT

## 2020-03-18 PROCEDURE — 36415 COLL VENOUS BLD VENIPUNCTURE: CPT

## 2020-03-18 PROCEDURE — 80076 HEPATIC FUNCTION PANEL: CPT

## 2020-03-18 PROCEDURE — 6360000002 HC RX W HCPCS: Performed by: INTERNAL MEDICINE

## 2020-03-18 RX ORDER — DIPHENOXYLATE HYDROCHLORIDE AND ATROPINE SULFATE 2.5; .025 MG/1; MG/1
1 TABLET ORAL 4 TIMES DAILY PRN
Qty: 60 TABLET | Refills: 2 | Status: SHIPPED | OUTPATIENT
Start: 2020-03-18 | End: 2020-04-15 | Stop reason: SDUPTHER

## 2020-03-18 RX ADMIN — OCTREOTIDE ACETATE 30 MG: KIT at 11:49

## 2020-03-18 NOTE — PLAN OF CARE
Problem: Musculor/Skeletal Functional Status  Goal: Absence of falls  Outcome: Met This Shift  Note: No falls this admission   Intervention: Fall precautions  Note: Patient aware of fall precautions for here and at home -call light in reach while here       Problem: Intellectual/Education/Knowledge Deficit  Goal: Teaching initiated upon admission  Outcome: Met This Shift  Note: Reviewed sandostatin lar with patient, patient offered no questions or concerns. Patient verbalized understanding of drug being administered. Goal: Written Disposition Instruction form completed  Outcome: Met This Shift  Note: Discharge instructions given and reviewed with patient. All questions answered. Patient verbalized understanding   Intervention: Verbal/written education provided  Note: Discharge instruction sheets     Problem: Discharge Planning  Goal: Knowledge of discharge instructions  Description: Knowledge of discharge instructions     Outcome: Met This Shift  Note: Patient  able to teach back follow up appointments and when to call the doctor.  Patient offers no questions at this time   Intervention: Interaction with patient/family and care team  Note: Patient currently denies any needs or concerns    Intervention: Discharge to appropriate level of care  Note: Discharge instruction sheets

## 2020-03-25 LAB — CHROMOGRANIN A: 9220 NG/ML (ref 0–160)

## 2020-04-12 RX ORDER — DIPHENHYDRAMINE HYDROCHLORIDE 50 MG/ML
50 INJECTION INTRAMUSCULAR; INTRAVENOUS ONCE
Status: CANCELLED | OUTPATIENT
Start: 2020-04-15

## 2020-04-12 RX ORDER — HEPARIN SODIUM (PORCINE) LOCK FLUSH IV SOLN 100 UNIT/ML 100 UNIT/ML
500 SOLUTION INTRAVENOUS PRN
Status: CANCELLED | OUTPATIENT
Start: 2020-04-15

## 2020-04-12 RX ORDER — METHYLPREDNISOLONE SODIUM SUCCINATE 125 MG/2ML
125 INJECTION, POWDER, LYOPHILIZED, FOR SOLUTION INTRAMUSCULAR; INTRAVENOUS ONCE
Status: CANCELLED | OUTPATIENT
Start: 2020-04-15

## 2020-04-12 RX ORDER — SODIUM CHLORIDE 0.9 % (FLUSH) 0.9 %
10 SYRINGE (ML) INJECTION PRN
Status: CANCELLED | OUTPATIENT
Start: 2020-04-15

## 2020-04-12 RX ORDER — SODIUM CHLORIDE 9 MG/ML
INJECTION, SOLUTION INTRAVENOUS CONTINUOUS
Status: CANCELLED | OUTPATIENT
Start: 2020-04-15

## 2020-04-12 RX ORDER — 0.9 % SODIUM CHLORIDE 0.9 %
10 VIAL (ML) INJECTION ONCE
Status: CANCELLED | OUTPATIENT
Start: 2020-04-15

## 2020-04-15 ENCOUNTER — OFFICE VISIT (OUTPATIENT)
Dept: ONCOLOGY | Age: 85
End: 2020-04-15
Payer: MEDICARE

## 2020-04-15 ENCOUNTER — HOSPITAL ENCOUNTER (OUTPATIENT)
Dept: INFUSION THERAPY | Age: 85
Discharge: HOME | End: 2020-04-15
Payer: MEDICARE

## 2020-04-15 ENCOUNTER — TELEPHONE (OUTPATIENT)
Dept: ONCOLOGY | Age: 85
End: 2020-04-15

## 2020-04-15 VITALS
DIASTOLIC BLOOD PRESSURE: 60 MMHG | RESPIRATION RATE: 18 BRPM | WEIGHT: 159.6 LBS | TEMPERATURE: 98 F | HEART RATE: 61 BPM | BODY MASS INDEX: 24.19 KG/M2 | HEIGHT: 68 IN | SYSTOLIC BLOOD PRESSURE: 106 MMHG | OXYGEN SATURATION: 100 %

## 2020-04-15 DIAGNOSIS — C7A.00 MALIGNANT CARCINOID TUMOR, UNSPECIFIED SITE (HCC): Primary | ICD-10-CM

## 2020-04-15 LAB
ALBUMIN SERPL-MCNC: 3.5 G/DL (ref 3.5–5.1)
ALP BLD-CCNC: 180 U/L (ref 38–126)
ALT SERPL-CCNC: 14 U/L (ref 11–66)
AST SERPL-CCNC: 19 U/L (ref 5–40)
BILIRUB SERPL-MCNC: 0.7 MG/DL (ref 0.3–1.2)
BILIRUBIN DIRECT: < 0.2 MG/DL (ref 0–0.3)
BUN BLDV-MCNC: 14 MG/DL (ref 7–22)
CHLORIDE, WHOLE BLOOD: 100 MEQ/L (ref 98–109)
CO2: 26 MEQ/L (ref 23–33)
CREATININE, WHOLE BLOOD: 1.2 MG/DL (ref 0.5–1.2)
GFR, ESTIMATED ,CON: 61 ML/MIN/1.73M2
GLUCOSE, WHOLE BLOOD: 104 MG/DL (ref 70–108)
HCT VFR BLD CALC: 37.4 % (ref 42–52)
HEMOGLOBIN: 12.4 GM/DL (ref 14–18)
IONIZED CALCIUM, WHOLE BLOOD: 1.13 MMOL/L (ref 1.12–1.32)
MCH RBC QN AUTO: 28.8 PG (ref 27–31)
MCHC RBC AUTO-ENTMCNC: 33.1 GM/DL (ref 33–37)
MCV RBC AUTO: 87 FL (ref 80–94)
PDW BLD-RTO: 15.3 % (ref 11.5–14.5)
PLATELET # BLD: 193 THOU/MM3 (ref 130–400)
PMV BLD AUTO: 8.8 FL (ref 7.4–10.4)
POTASSIUM, WHOLE BLOOD: 4.3 MEQ/L (ref 3.5–4.9)
RBC # BLD: 4.28 MILL/MM3 (ref 4.7–6.1)
SEG NEUTROPHILS: 59 % (ref 43–75)
SEGMENTED NEUTROPHILS ABSOLUTE COUNT: 5 THOU/MM3 (ref 1.8–7.7)
SODIUM, WHOLE BLOOD: 137 MEQ/L (ref 138–146)
TOTAL PROTEIN: 6.6 G/DL (ref 6.1–8)
WBC # BLD: 8.5 THOU/MM3 (ref 4.8–10.8)

## 2020-04-15 PROCEDURE — 80047 BASIC METABLC PNL IONIZED CA: CPT

## 2020-04-15 PROCEDURE — 99215 OFFICE O/P EST HI 40 MIN: CPT | Performed by: INTERNAL MEDICINE

## 2020-04-15 PROCEDURE — 4040F PNEUMOC VAC/ADMIN/RCVD: CPT | Performed by: INTERNAL MEDICINE

## 2020-04-15 PROCEDURE — 96372 THER/PROPH/DIAG INJ SC/IM: CPT

## 2020-04-15 PROCEDURE — 36415 COLL VENOUS BLD VENIPUNCTURE: CPT

## 2020-04-15 PROCEDURE — 1123F ACP DISCUSS/DSCN MKR DOCD: CPT | Performed by: INTERNAL MEDICINE

## 2020-04-15 PROCEDURE — 1036F TOBACCO NON-USER: CPT | Performed by: INTERNAL MEDICINE

## 2020-04-15 PROCEDURE — 84520 ASSAY OF UREA NITROGEN: CPT

## 2020-04-15 PROCEDURE — 99211 OFF/OP EST MAY X REQ PHY/QHP: CPT

## 2020-04-15 PROCEDURE — 82374 ASSAY BLOOD CARBON DIOXIDE: CPT

## 2020-04-15 PROCEDURE — G8427 DOCREV CUR MEDS BY ELIG CLIN: HCPCS | Performed by: INTERNAL MEDICINE

## 2020-04-15 PROCEDURE — 80076 HEPATIC FUNCTION PANEL: CPT

## 2020-04-15 PROCEDURE — 6360000002 HC RX W HCPCS: Performed by: INTERNAL MEDICINE

## 2020-04-15 PROCEDURE — G8420 CALC BMI NORM PARAMETERS: HCPCS | Performed by: INTERNAL MEDICINE

## 2020-04-15 PROCEDURE — 85027 COMPLETE CBC AUTOMATED: CPT

## 2020-04-15 RX ORDER — DIPHENOXYLATE HYDROCHLORIDE AND ATROPINE SULFATE 2.5; .025 MG/1; MG/1
1 TABLET ORAL 4 TIMES DAILY PRN
Qty: 120 TABLET | Refills: 5 | Status: SHIPPED | OUTPATIENT
Start: 2020-04-15 | End: 2021-04-16

## 2020-04-15 RX ADMIN — OCTREOTIDE ACETATE 30 MG: KIT at 11:27

## 2020-04-15 NOTE — TELEPHONE ENCOUNTER
Patient called with his decision on his chemo. He says that he does want to go to [place] for chemo treatment.

## 2020-04-15 NOTE — PROGRESS NOTES
with Dr. Kristin Sevilla who he has previously seen. The patient complains of fatigue and generalized weakness. He has been having intermittent diarrhea that is been more prominent. Connie Calvert has been using Lomotil to help control his diarrhea. The patient has not had fever, cough, or shortness of breath. His ECOG performance status is level 1. The patient also has history of renal cell carcinoma. He has no signs or symptoms that be suggestive recurrence of his renal cell malignancy. The patient's kidney function is remained stable. He also does have chronic anemia which is probably multifactorial nature. The patient does not report any evidence of blood loss. His anemia has been chronic and stable. PMH, SH, and FH:  I reviewed the PMH, SH and FH as noted on the electronic medical record. There have been no changes as noted in the previous documentation. Review of Systems   Constitutional: Fatigue. HENT: Negative. Eyes: Negative. Respiratory: Negative. Cardiovascular: Negative. Gastrointestinal: Diarrhea. Genitourinary: Negative. Musculoskeletal: Negative. Skin: Negative. Neurological: General weakness, neuropathy. Hematological: Negative. Psychiatric/Behavioral: Negative. Objective:   Physical Exam   Vitals:    04/15/20 1021   BP: 106/60   Pulse: 61   Resp: 18   Temp: 98 °F (36.7 °C)   SpO2: 100%   Vitals reviewed and are stable. Constitutional: Well-developed and well-nourished. No acute distress. HENT: Normocephalic and atraumatic. Eyes: Pupils are equal and reactive. No scleral icterus. Neck: Overall appearance is symmetrical. No identifiable masses. Chest: Inspection and palpation of chest is normal.  Pulmonary: Effort normal. No respiratory distress. Cardiovascular: RRR. No edema in any of the four extremities. Abdominal: Soft. No hepatomegaly or splenomegaly. Musculoskeletal: Gait is normal. Muscle strength and tone good.    Neurological: Alert and

## 2020-04-15 NOTE — PLAN OF CARE
Problem: Musculor/Skeletal Functional Status  Goal: Absence of falls  Outcome: Met This Shift  Note: No falls this admission   Intervention: Fall precautions  Note: Patient aware of fall precautions for here and at home -call light in reach while here       Problem: Intellectual/Education/Knowledge Deficit  Goal: Teaching initiated upon admission  Outcome: Met This Shift  Note: Reviewed sandostatin lar with patient, patient offered no questions or concerns. Patient verbalized understanding of drug being administered. Goal: Written Disposition Instruction form completed  Outcome: Met This Shift  Note: Discharge instructions given and reviewed with patient. All questions answered. Patient verbalized understanding   Intervention: Verbal/written education provided  Note: Discharge instruction sheets      Problem: Discharge Planning  Goal: Knowledge of discharge instructions  Description: Knowledge of discharge instructions     Outcome: Met This Shift  Note: Patient  able to teach back follow up appointments and when to call the doctor. Patient offers no questions at this time   Intervention: Interaction with patient/family and care team  Note: Patient given support and all questions asked about labs  Intervention: Discharge to appropriate level of care  Note: Discharge home    Care plan reviewed with patient and he  verbalized understanding of the plan of care and contributed to goal setting.

## 2020-04-21 RX ORDER — SODIUM CHLORIDE 0.9 % (FLUSH) 0.9 %
20 SYRINGE (ML) INJECTION PRN
Status: CANCELLED | OUTPATIENT
Start: 2020-04-21

## 2020-04-21 RX ORDER — HEPARIN SODIUM (PORCINE) LOCK FLUSH IV SOLN 100 UNIT/ML 100 UNIT/ML
500 SOLUTION INTRAVENOUS PRN
Status: CANCELLED | OUTPATIENT
Start: 2020-04-21

## 2020-04-21 RX ORDER — SODIUM CHLORIDE 0.9 % (FLUSH) 0.9 %
10 SYRINGE (ML) INJECTION PRN
Status: CANCELLED | OUTPATIENT
Start: 2020-04-21

## 2020-05-12 RX ORDER — SODIUM CHLORIDE 0.9 % (FLUSH) 0.9 %
10 SYRINGE (ML) INJECTION PRN
Status: CANCELLED | OUTPATIENT
Start: 2020-05-13

## 2020-05-12 RX ORDER — HEPARIN SODIUM (PORCINE) LOCK FLUSH IV SOLN 100 UNIT/ML 100 UNIT/ML
500 SOLUTION INTRAVENOUS PRN
Status: CANCELLED | OUTPATIENT
Start: 2020-05-13

## 2020-05-12 RX ORDER — SODIUM CHLORIDE 9 MG/ML
INJECTION, SOLUTION INTRAVENOUS CONTINUOUS
Status: CANCELLED | OUTPATIENT
Start: 2020-05-13

## 2020-05-12 RX ORDER — DIPHENHYDRAMINE HYDROCHLORIDE 50 MG/ML
50 INJECTION INTRAMUSCULAR; INTRAVENOUS ONCE
Status: CANCELLED | OUTPATIENT
Start: 2020-05-13

## 2020-05-12 RX ORDER — 0.9 % SODIUM CHLORIDE 0.9 %
10 VIAL (ML) INJECTION ONCE
Status: CANCELLED | OUTPATIENT
Start: 2020-05-13

## 2020-05-12 RX ORDER — METHYLPREDNISOLONE SODIUM SUCCINATE 125 MG/2ML
125 INJECTION, POWDER, LYOPHILIZED, FOR SOLUTION INTRAMUSCULAR; INTRAVENOUS ONCE
Status: CANCELLED | OUTPATIENT
Start: 2020-05-13

## 2020-05-13 ENCOUNTER — HOSPITAL ENCOUNTER (OUTPATIENT)
Dept: INFUSION THERAPY | Age: 85
Discharge: HOME | End: 2020-05-13
Payer: MEDICARE

## 2020-05-13 ENCOUNTER — OFFICE VISIT (OUTPATIENT)
Dept: ONCOLOGY | Age: 85
End: 2020-05-13
Payer: MEDICARE

## 2020-05-13 VITALS
OXYGEN SATURATION: 100 % | DIASTOLIC BLOOD PRESSURE: 60 MMHG | HEIGHT: 69 IN | SYSTOLIC BLOOD PRESSURE: 122 MMHG | WEIGHT: 162.4 LBS | TEMPERATURE: 98 F | RESPIRATION RATE: 18 BRPM | BODY MASS INDEX: 24.05 KG/M2 | HEART RATE: 59 BPM

## 2020-05-13 DIAGNOSIS — C7A.00 MALIGNANT CARCINOID TUMOR, UNSPECIFIED SITE (HCC): Primary | ICD-10-CM

## 2020-05-13 LAB
ALBUMIN SERPL-MCNC: 3.4 G/DL (ref 3.5–5.1)
ALP BLD-CCNC: 197 U/L (ref 38–126)
ALT SERPL-CCNC: 21 U/L (ref 11–66)
AST SERPL-CCNC: 32 U/L (ref 5–40)
BILIRUB SERPL-MCNC: 0.4 MG/DL (ref 0.3–1.2)
BILIRUBIN DIRECT: < 0.2 MG/DL (ref 0–0.3)
BUN BLDV-MCNC: 13 MG/DL (ref 7–22)
CHLORIDE, WHOLE BLOOD: 99 MEQ/L (ref 98–109)
CO2: 27 MEQ/L (ref 23–33)
CREATININE, WHOLE BLOOD: 1.1 MG/DL (ref 0.5–1.2)
GFR, ESTIMATED ,CON: 67 ML/MIN/1.73M2
GLUCOSE, WHOLE BLOOD: 122 MG/DL (ref 70–108)
HCT VFR BLD CALC: 36.4 % (ref 42–52)
HEMOGLOBIN: 11.6 GM/DL (ref 14–18)
IONIZED CALCIUM, WHOLE BLOOD: 1.15 MMOL/L (ref 1.12–1.32)
MCH RBC QN AUTO: 27.8 PG (ref 26–33)
MCHC RBC AUTO-ENTMCNC: 31.9 GM/DL (ref 32.2–35.5)
MCV RBC AUTO: 87 FL (ref 80–94)
PDW BLD-RTO: 16.3 % (ref 11.5–14.5)
PLATELET # BLD: 236 THOU/MM3 (ref 130–400)
PMV BLD AUTO: 10.7 FL (ref 9.4–12.4)
POTASSIUM, WHOLE BLOOD: 4.4 MEQ/L (ref 3.5–4.9)
RBC # BLD: 4.18 MILL/MM3 (ref 4.7–6.1)
SEG NEUTROPHILS: 61 % (ref 43–75)
SEGMENTED NEUTROPHILS ABSOLUTE COUNT: 5.1 THOU/MM3 (ref 1.8–7.7)
SODIUM, WHOLE BLOOD: 138 MEQ/L (ref 138–146)
TOTAL PROTEIN: 6 G/DL (ref 6.1–8)
WBC # BLD: 8.3 THOU/MM3 (ref 4.8–10.8)

## 2020-05-13 PROCEDURE — 4040F PNEUMOC VAC/ADMIN/RCVD: CPT | Performed by: INTERNAL MEDICINE

## 2020-05-13 PROCEDURE — 99211 OFF/OP EST MAY X REQ PHY/QHP: CPT

## 2020-05-13 PROCEDURE — 80076 HEPATIC FUNCTION PANEL: CPT

## 2020-05-13 PROCEDURE — 6360000002 HC RX W HCPCS: Performed by: INTERNAL MEDICINE

## 2020-05-13 PROCEDURE — 1123F ACP DISCUSS/DSCN MKR DOCD: CPT | Performed by: INTERNAL MEDICINE

## 2020-05-13 PROCEDURE — 36415 COLL VENOUS BLD VENIPUNCTURE: CPT

## 2020-05-13 PROCEDURE — 85027 COMPLETE CBC AUTOMATED: CPT

## 2020-05-13 PROCEDURE — G8428 CUR MEDS NOT DOCUMENT: HCPCS | Performed by: INTERNAL MEDICINE

## 2020-05-13 PROCEDURE — 86316 IMMUNOASSAY TUMOR OTHER: CPT

## 2020-05-13 PROCEDURE — 84520 ASSAY OF UREA NITROGEN: CPT

## 2020-05-13 PROCEDURE — 1036F TOBACCO NON-USER: CPT | Performed by: INTERNAL MEDICINE

## 2020-05-13 PROCEDURE — 99215 OFFICE O/P EST HI 40 MIN: CPT | Performed by: INTERNAL MEDICINE

## 2020-05-13 PROCEDURE — 80047 BASIC METABLC PNL IONIZED CA: CPT

## 2020-05-13 PROCEDURE — 82374 ASSAY BLOOD CARBON DIOXIDE: CPT

## 2020-05-13 PROCEDURE — G8420 CALC BMI NORM PARAMETERS: HCPCS | Performed by: INTERNAL MEDICINE

## 2020-05-13 PROCEDURE — 96372 THER/PROPH/DIAG INJ SC/IM: CPT

## 2020-05-13 RX ORDER — OXYCODONE HYDROCHLORIDE 5 MG/1
5 TABLET ORAL EVERY 4 HOURS PRN
Qty: 180 TABLET | Refills: 0 | Status: SHIPPED | OUTPATIENT
Start: 2020-05-13 | End: 2020-05-26 | Stop reason: SDUPTHER

## 2020-05-13 RX ADMIN — OCTREOTIDE ACETATE 30 MG: KIT at 14:10

## 2020-05-13 NOTE — PLAN OF CARE
Care plan reviewed with patient. Patient verbalized understanding of the plan of care and contribute to goal setting. Problem: Intellectual/Education/Knowledge Deficit  Goal: Teaching initiated upon admission  Outcome: Met This Shift  Note: Patient verbalizes understanding to verbal information given on sandostatin,action and possible side effects. Aware to call MD if develop complications. Intervention: Verbal/written education provided  Note: Discuss Sandostatin, action, side effects and when to call the doctor     Problem: Discharge Planning  Goal: Knowledge of discharge instructions  Description: Knowledge of discharge instructions     Outcome: Met This Shift  Note: Verbalized understanding of discharge instructions, follow ups and when to call doctor   Intervention: Discharge to appropriate level of care  Note: Discuss discharge instructions, follow ups and when to call doctor. Problem: Falls - Risk of:  Goal: Will remain free from falls  Description: Will remain free from falls  Outcome: Met This Shift  Note: Free from falls while in infusion center.  Verbalized understanding of fall prevention and to ask for assistance with ambulation   Intervention: Assess risk factors for falls  Description: Assess risk factors for falls  Note: Assess for fall risk, instruct to ask for assistance with ambulation

## 2020-05-18 LAB — CHROMOGRANIN A: 7200 NG/ML (ref 0–160)

## 2020-05-24 NOTE — PROGRESS NOTES
clinical situation. Behavior is normal.     Data Analysis:    Hematology 5/13/2020 4/15/2020 3/18/2020   WBC 8.3 8.5 9.1   RBC 4.18 (L) 4.28 (L) 4.63 (L)   HGB 11.6 (L) 12.4 (L) 13.0 (L)   HCT 36.4 (L) 37.4 (L) 40.4 (L)   MCV 87 87 87   RDW 16.3 (H) 15.3 (H) 13.8    193 204     Assessment:   1. Carcinoid malignancy. 2.  Renal cell carcinoma. 3.  Therapy with Octreotide. 4.  Chronic anemia. 5.  Diarrhea. Plan:   1. Continue follow-up at [facility] for repeat TACE procedure. 2.  Continue therapy with octreotide today. 3.  Monitor for progression of malignancy. 4.  Monitor hemoglobin hematocrit and for any evidence of blood loss. 5.  Monitor for toxicity of octreotide therapy. 6.  Continue Lomotil as needed for control of diarrhea. Katelin Dodson M.D. Medical Director: [facility] [place] [address] [address] [facility], [address] [place] [address], [address] [place] [address], [address] [place] [address] of the [facility] at the [facility]      **This report has been created using voice recognition software. It may contain minor errors which are inherent in voice recognition technology. **          .

## 2020-05-26 RX ORDER — OXYCODONE HYDROCHLORIDE 5 MG/1
5 TABLET ORAL EVERY 4 HOURS PRN
Qty: 180 TABLET | Refills: 0 | Status: SHIPPED | OUTPATIENT
Start: 2020-05-26 | End: 2020-06-25

## 2020-06-09 RX ORDER — METHYLPREDNISOLONE SODIUM SUCCINATE 125 MG/2ML
125 INJECTION, POWDER, LYOPHILIZED, FOR SOLUTION INTRAMUSCULAR; INTRAVENOUS ONCE
Status: CANCELLED | OUTPATIENT
Start: 2020-06-10

## 2020-06-09 RX ORDER — 0.9 % SODIUM CHLORIDE 0.9 %
10 VIAL (ML) INJECTION ONCE
Status: CANCELLED | OUTPATIENT
Start: 2020-06-10

## 2020-06-09 RX ORDER — DIPHENHYDRAMINE HYDROCHLORIDE 50 MG/ML
50 INJECTION INTRAMUSCULAR; INTRAVENOUS ONCE
Status: CANCELLED | OUTPATIENT
Start: 2020-06-10

## 2020-06-09 RX ORDER — SODIUM CHLORIDE 9 MG/ML
INJECTION, SOLUTION INTRAVENOUS CONTINUOUS
Status: CANCELLED | OUTPATIENT
Start: 2020-06-10

## 2020-06-09 RX ORDER — HEPARIN SODIUM (PORCINE) LOCK FLUSH IV SOLN 100 UNIT/ML 100 UNIT/ML
500 SOLUTION INTRAVENOUS PRN
Status: CANCELLED | OUTPATIENT
Start: 2020-06-10

## 2020-06-09 RX ORDER — SODIUM CHLORIDE 0.9 % (FLUSH) 0.9 %
10 SYRINGE (ML) INJECTION PRN
Status: CANCELLED | OUTPATIENT
Start: 2020-06-10

## 2020-06-10 ENCOUNTER — HOSPITAL ENCOUNTER (OUTPATIENT)
Dept: INFUSION THERAPY | Age: 85
Discharge: HOME | End: 2020-06-10
Payer: MEDICARE

## 2020-06-10 VITALS
RESPIRATION RATE: 18 BRPM | WEIGHT: 157.6 LBS | SYSTOLIC BLOOD PRESSURE: 116 MMHG | OXYGEN SATURATION: 99 % | TEMPERATURE: 98 F | BODY MASS INDEX: 23.34 KG/M2 | HEIGHT: 69 IN | HEART RATE: 74 BPM | DIASTOLIC BLOOD PRESSURE: 59 MMHG

## 2020-06-10 DIAGNOSIS — C7A.00 MALIGNANT CARCINOID TUMOR, UNSPECIFIED SITE (HCC): Primary | ICD-10-CM

## 2020-06-10 LAB
ABSOLUTE IMMATURE GRANULOCYTE: 0.04 THOU/MM3 (ref 0–0.07)
ALBUMIN SERPL-MCNC: 3 G/DL (ref 3.5–5.1)
ALP BLD-CCNC: 403 U/L (ref 38–126)
ALT SERPL-CCNC: 71 U/L (ref 11–66)
AST SERPL-CCNC: 47 U/L (ref 5–40)
BASINOPHIL, AUTOMATED: 1 % (ref 0–3)
BASOPHILS ABSOLUTE: 0 THOU/MM3 (ref 0–0.1)
BILIRUB SERPL-MCNC: 0.6 MG/DL (ref 0.3–1.2)
BILIRUBIN DIRECT: 0.3 MG/DL (ref 0–0.3)
BUN BLDV-MCNC: 9 MG/DL (ref 7–22)
CHLORIDE, WHOLE BLOOD: 100 MEQ/L (ref 98–109)
CO2: 26 MEQ/L (ref 23–33)
CREATININE, WHOLE BLOOD: 1 MG/DL (ref 0.5–1.2)
EOSINOPHILS ABSOLUTE: 0.3 THOU/MM3 (ref 0–0.4)
EOSINOPHILS RELATIVE PERCENT: 4 % (ref 0–4)
GFR, ESTIMATED ,CON: 75 ML/MIN/1.73M2
GLUCOSE, WHOLE BLOOD: 233 MG/DL (ref 70–108)
HCT VFR BLD CALC: 36.5 % (ref 42–52)
HEMOGLOBIN: 11.5 GM/DL (ref 14–18)
IMMATURE GRANULOCYTES: 1 %
IONIZED CALCIUM, WHOLE BLOOD: 1.08 MMOL/L (ref 1.12–1.32)
LYMPHOCYTES # BLD: 18 % (ref 15–47)
LYMPHOCYTES ABSOLUTE: 1.4 THOU/MM3 (ref 1–4.8)
MCH RBC QN AUTO: 27.7 PG (ref 26–33)
MCHC RBC AUTO-ENTMCNC: 31.5 GM/DL (ref 32.2–35.5)
MCV RBC AUTO: 88 FL (ref 80–94)
MONOCYTES ABSOLUTE: 0.7 THOU/MM3 (ref 0.4–1.3)
MONOCYTES: 9 % (ref 0–12)
PDW BLD-RTO: 15.8 % (ref 11.5–14.5)
PLATELET # BLD: 255 THOU/MM3 (ref 130–400)
PMV BLD AUTO: 10.5 FL (ref 9.4–12.4)
POTASSIUM, WHOLE BLOOD: 4.2 MEQ/L (ref 3.5–4.9)
RBC # BLD: 4.15 MILL/MM3 (ref 4.7–6.1)
SEG NEUTROPHILS: 69 % (ref 43–75)
SEGMENTED NEUTROPHILS ABSOLUTE COUNT: 5.5 THOU/MM3 (ref 1.8–7.7)
SODIUM, WHOLE BLOOD: 136 MEQ/L (ref 138–146)
TOTAL PROTEIN: 6.4 G/DL (ref 6.1–8)
WBC # BLD: 8 THOU/MM3 (ref 4.8–10.8)

## 2020-06-10 PROCEDURE — 36415 COLL VENOUS BLD VENIPUNCTURE: CPT

## 2020-06-10 PROCEDURE — 85025 COMPLETE CBC W/AUTO DIFF WBC: CPT

## 2020-06-10 PROCEDURE — 82374 ASSAY BLOOD CARBON DIOXIDE: CPT

## 2020-06-10 PROCEDURE — 96372 THER/PROPH/DIAG INJ SC/IM: CPT

## 2020-06-10 PROCEDURE — 86316 IMMUNOASSAY TUMOR OTHER: CPT

## 2020-06-10 PROCEDURE — 80076 HEPATIC FUNCTION PANEL: CPT

## 2020-06-10 PROCEDURE — 84520 ASSAY OF UREA NITROGEN: CPT

## 2020-06-10 PROCEDURE — 6360000002 HC RX W HCPCS: Performed by: INTERNAL MEDICINE

## 2020-06-10 PROCEDURE — 80047 BASIC METABLC PNL IONIZED CA: CPT

## 2020-06-10 RX ADMIN — OCTREOTIDE ACETATE 30 MG: KIT at 14:02

## 2020-06-10 NOTE — PLAN OF CARE
Problem: Intellectual/Education/Knowledge Deficit  Goal: Teaching initiated upon admission  Outcome: Met This Shift  Note: Patient verbalizes understanding to verbal information given on sandostatin, including action and possible side effects. Aware to call MD if develop complications. Intervention: Verbal/written education provided  Note: Verbal education provided on sandostatin injection prior to administration. Problem: Discharge Planning  Goal: Knowledge of discharge instructions  Description: Knowledge of discharge instructions     Outcome: Met This Shift  Note: Patient verbalizes understanding of discharge instructions, follow up appointment, and when to call physician if needed   Intervention: Discharge to appropriate level of care  Note: Discharge instructions given to pt and reviewed. Follow up appointments discussed. Problem: Falls - Risk of:  Goal: Will remain free from falls  Description: Will remain free from falls  Outcome: Met This Shift  Note: Patient free of falls this visit. Intervention: Assess risk factors for falls  Note: Fall risks assessed. Precautions discussed. Call light within reach during visit. Care plan reviewed with patient. Patient verbalizes understanding of the plan of care and contributes to goal setting.

## 2020-06-10 NOTE — PROGRESS NOTES
Pt tolerated sandostatin injection without any complications. Discharge instructions given to patient. Patient verbalizes understanding. Pt ambulated off unit per self with belongings.

## 2020-06-15 LAB — CHROMOGRANIN A: 4780 NG/ML (ref 0–160)

## 2020-06-17 ENCOUNTER — TELEPHONE (OUTPATIENT)
Dept: ONCOLOGY | Age: 85
End: 2020-06-17

## 2020-06-22 ENCOUNTER — TELEPHONE (OUTPATIENT)
Dept: ONCOLOGY | Age: 85
End: 2020-06-22

## 2020-06-23 ENCOUNTER — TELEPHONE (OUTPATIENT)
Dept: ONCOLOGY | Age: 85
End: 2020-06-23

## 2020-07-08 ENCOUNTER — HOSPITAL ENCOUNTER (OUTPATIENT)
Dept: INFUSION THERAPY | Age: 85
End: 2020-07-08

## (undated) DEVICE — HF-RESECTION ELECTRODE PLASMALOOP LOOP, MEDIUM, 24 FR., 12°-30°, ESG TURIS: Brand: OLYMPUS

## (undated) DEVICE — BLADE LARYNSCP SZ 3 ENH DIR INTUB GLIDESCOPE MCGRATH MAC

## (undated) DEVICE — DRAINBAG,ANTI-REFLUX TOWER,L/F,2000ML,LL: Brand: MEDLINE

## (undated) DEVICE — SYRINGE CATH TIP 50ML

## (undated) DEVICE — CATHETER URETH 24FR BLLN 30CC STD LTX 3 W TWO OPP DRNGE EYE

## (undated) DEVICE — Z INACTIVE USE 2660664 SOLUTION IRRIG 3000ML 0.9% SOD CHL USP UROMATIC PLAS CONT

## (undated) DEVICE — SOLUTION IV 1000ML 0.9% SOD CHL PH 5 INJ USP VIAFLX PLAS

## (undated) DEVICE — GLOVE SURG SZ 6 THK91MIL LTX FREE SYN POLYISOPRENE ANTI

## (undated) DEVICE — BASIC SINGLE BASIN BTC-LF: Brand: MEDLINE INDUSTRIES, INC.

## (undated) DEVICE — CYSTO PACK: Brand: MEDLINE INDUSTRIES, INC.

## (undated) DEVICE — 3M™ WARMING BLANKET, UPPER BODY, 10 PER CASE, 42268: Brand: BAIR HUGGER™

## (undated) DEVICE — SOLUTION IV IRRIG POUR BRL 0.9% SODIUM CHL 2F7124